# Patient Record
Sex: FEMALE | Race: WHITE
[De-identification: names, ages, dates, MRNs, and addresses within clinical notes are randomized per-mention and may not be internally consistent; named-entity substitution may affect disease eponyms.]

---

## 2019-09-23 ENCOUNTER — HOSPITAL ENCOUNTER (OUTPATIENT)
Dept: HOSPITAL 82 - ORM | Age: 74
Discharge: HOME | End: 2019-09-23
Attending: ORTHOPAEDIC SURGERY
Payer: MEDICARE

## 2019-09-23 VITALS — BODY MASS INDEX: 30.29 KG/M2 | HEIGHT: 67 IN | WEIGHT: 193 LBS

## 2019-09-23 VITALS — DIASTOLIC BLOOD PRESSURE: 55 MMHG | SYSTOLIC BLOOD PRESSURE: 104 MMHG

## 2019-09-23 DIAGNOSIS — G56.02: Primary | ICD-10-CM

## 2019-09-23 DIAGNOSIS — E11.9: ICD-10-CM

## 2019-09-23 DIAGNOSIS — Z87.891: ICD-10-CM

## 2019-09-23 PROCEDURE — 01N50ZZ RELEASE MEDIAN NERVE, OPEN APPROACH: ICD-10-PCS | Performed by: ORTHOPAEDIC SURGERY

## 2021-03-24 ENCOUNTER — OFFICE VISIT (OUTPATIENT)
Dept: PRIMARY CARE CLINIC | Age: 76
End: 2021-03-24
Payer: MEDICARE

## 2021-03-24 VITALS
HEIGHT: 67 IN | BODY MASS INDEX: 29.38 KG/M2 | DIASTOLIC BLOOD PRESSURE: 68 MMHG | SYSTOLIC BLOOD PRESSURE: 120 MMHG | TEMPERATURE: 96.9 F | WEIGHT: 187.2 LBS | HEART RATE: 58 BPM | OXYGEN SATURATION: 97 %

## 2021-03-24 DIAGNOSIS — E11.65 TYPE 2 DIABETES MELLITUS WITH HYPERGLYCEMIA, WITH LONG-TERM CURRENT USE OF INSULIN (HCC): ICD-10-CM

## 2021-03-24 DIAGNOSIS — K21.9 GASTROESOPHAGEAL REFLUX DISEASE WITHOUT ESOPHAGITIS: ICD-10-CM

## 2021-03-24 DIAGNOSIS — H61.23 BILATERAL HEARING LOSS DUE TO CERUMEN IMPACTION: ICD-10-CM

## 2021-03-24 DIAGNOSIS — Z79.4 TYPE 2 DIABETES MELLITUS WITH HYPERGLYCEMIA, WITH LONG-TERM CURRENT USE OF INSULIN (HCC): ICD-10-CM

## 2021-03-24 DIAGNOSIS — G47.33 OSA (OBSTRUCTIVE SLEEP APNEA): Primary | ICD-10-CM

## 2021-03-24 DIAGNOSIS — E08.40 DIABETES MELLITUS DUE TO UNDERLYING CONDITION WITH DIABETIC NEUROPATHY, WITH LONG-TERM CURRENT USE OF INSULIN (HCC): ICD-10-CM

## 2021-03-24 DIAGNOSIS — R53.83 OTHER FATIGUE: ICD-10-CM

## 2021-03-24 DIAGNOSIS — Z79.4 DIABETES MELLITUS DUE TO UNDERLYING CONDITION WITH DIABETIC NEUROPATHY, WITH LONG-TERM CURRENT USE OF INSULIN (HCC): ICD-10-CM

## 2021-03-24 DIAGNOSIS — E11.42 DIABETIC POLYNEUROPATHY ASSOCIATED WITH TYPE 2 DIABETES MELLITUS (HCC): ICD-10-CM

## 2021-03-24 DIAGNOSIS — R79.9 ABNORMAL FINDING OF BLOOD CHEMISTRY, UNSPECIFIED: ICD-10-CM

## 2021-03-24 LAB — HBA1C MFR BLD: 8.6 %

## 2021-03-24 PROCEDURE — 83036 HEMOGLOBIN GLYCOSYLATED A1C: CPT | Performed by: PHYSICIAN ASSISTANT

## 2021-03-24 PROCEDURE — 99204 OFFICE O/P NEW MOD 45 MIN: CPT | Performed by: PHYSICIAN ASSISTANT

## 2021-03-24 PROCEDURE — 69209 REMOVE IMPACTED EAR WAX UNI: CPT | Performed by: PHYSICIAN ASSISTANT

## 2021-03-24 RX ORDER — GEMFIBROZIL 600 MG/1
TABLET, FILM COATED ORAL
COMMUNITY
Start: 2021-02-24 | End: 2021-03-24 | Stop reason: SDUPTHER

## 2021-03-24 RX ORDER — GABAPENTIN 300 MG/1
300 CAPSULE ORAL 3 TIMES DAILY
Qty: 90 CAPSULE | Refills: 2 | Status: SHIPPED | OUTPATIENT
Start: 2021-03-24 | End: 2021-06-15 | Stop reason: SDUPTHER

## 2021-03-24 RX ORDER — PANTOPRAZOLE SODIUM 40 MG/1
TABLET, DELAYED RELEASE ORAL
COMMUNITY
Start: 2021-02-24 | End: 2021-03-24 | Stop reason: SDUPTHER

## 2021-03-24 RX ORDER — GLIPIZIDE 5 MG/1
5 TABLET ORAL
Qty: 60 TABLET | Refills: 2 | Status: SHIPPED | OUTPATIENT
Start: 2021-03-24 | End: 2021-06-14 | Stop reason: SDUPTHER

## 2021-03-24 RX ORDER — GEMFIBROZIL 600 MG/1
600 TABLET, FILM COATED ORAL 2 TIMES DAILY
Qty: 60 TABLET | Refills: 1 | Status: SHIPPED | OUTPATIENT
Start: 2021-03-24 | End: 2021-06-30 | Stop reason: SDUPTHER

## 2021-03-24 RX ORDER — PANTOPRAZOLE SODIUM 40 MG/1
40 TABLET, DELAYED RELEASE ORAL DAILY
Qty: 90 TABLET | Refills: 2 | Status: SHIPPED | OUTPATIENT
Start: 2021-03-24 | End: 2021-06-15 | Stop reason: SDUPTHER

## 2021-03-24 RX ORDER — GABAPENTIN 300 MG/1
CAPSULE ORAL
COMMUNITY
Start: 2021-01-23 | End: 2021-03-24 | Stop reason: SDUPTHER

## 2021-03-24 SDOH — ECONOMIC STABILITY: TRANSPORTATION INSECURITY
IN THE PAST 12 MONTHS, HAS THE LACK OF TRANSPORTATION KEPT YOU FROM MEDICAL APPOINTMENTS OR FROM GETTING MEDICATIONS?: NO

## 2021-03-24 SDOH — HEALTH STABILITY: MENTAL HEALTH: HOW OFTEN DO YOU HAVE A DRINK CONTAINING ALCOHOL?: NEVER

## 2021-03-24 SDOH — ECONOMIC STABILITY: INCOME INSECURITY: HOW HARD IS IT FOR YOU TO PAY FOR THE VERY BASICS LIKE FOOD, HOUSING, MEDICAL CARE, AND HEATING?: NOT HARD AT ALL

## 2021-03-24 ASSESSMENT — PATIENT HEALTH QUESTIONNAIRE - PHQ9
SUM OF ALL RESPONSES TO PHQ QUESTIONS 1-9: 0
SUM OF ALL RESPONSES TO PHQ9 QUESTIONS 1 & 2: 0
1. LITTLE INTEREST OR PLEASURE IN DOING THINGS: 0
SUM OF ALL RESPONSES TO PHQ QUESTIONS 1-9: 0

## 2021-03-24 ASSESSMENT — ENCOUNTER SYMPTOMS
PHOTOPHOBIA: 0
CONSTIPATION: 0
EYE DISCHARGE: 0
SHORTNESS OF BREATH: 1
DIARRHEA: 1
RHINORRHEA: 0
ABDOMINAL DISTENTION: 0
SINUS PRESSURE: 1
ABDOMINAL PAIN: 0
COUGH: 1
CHEST TIGHTNESS: 0
SORE THROAT: 0
VOMITING: 0

## 2021-03-24 NOTE — PATIENT INSTRUCTIONS
Patient Education        High Cholesterol: Care Instructions  Your Care Instructions     Cholesterol is a type of fat in your blood. It is needed for many body functions, such as making new cells. Cholesterol is made by your body. It also comes from food you eat. High cholesterol means that you have too much of the fat in your blood. This raises your risk of a heart attack and stroke. LDL and HDL are part of your total cholesterol. LDL is the \"bad\" cholesterol. High LDL can raise your risk for heart disease, heart attack, and stroke. HDL is the \"good\" cholesterol. It helps clear bad cholesterol from the body. High HDL is linked with a lower risk of heart disease, heart attack, and stroke. Your cholesterol levels help your doctor find out your risk for having a heart attack or stroke. You and your doctor can talk about whether you need to lower your risk and what treatment is best for you. A heart-healthy lifestyle along with medicines can help lower your cholesterol and your risk. The way you choose to lower your risk will depend on how high your risk is for heart attack and stroke. It will also depend on how you feel about taking medicines. Follow-up care is a key part of your treatment and safety. Be sure to make and go to all appointments, and call your doctor if you are having problems. It's also a good idea to know your test results and keep a list of the medicines you take. How can you care for yourself at home? · Eat a variety of foods every day. Good choices include fruits, vegetables, whole grains (like oatmeal), dried beans and peas, nuts and seeds, soy products (like tofu), and fat-free or low-fat dairy products. · Replace butter, margarine, and hydrogenated or partially hydrogenated oils with olive and canola oils. (Canola oil margarine without trans fat is fine.)  · Replace red meat with fish, poultry, and soy protein (like tofu).   · Limit processed and packaged foods like chips, crackers, and cookies. · Bake, broil, or steam foods. Don't gamble them. · Be physically active. Get at least 30 minutes of exercise on most days of the week. Walking is a good choice. You also may want to do other activities, such as running, swimming, cycling, or playing tennis or team sports. · Stay at a healthy weight or lose weight by making the changes in eating and physical activity listed above. Losing just a small amount of weight, even 5 to 10 pounds, can reduce your risk for having a heart attack or stroke. · Do not smoke. When should you call for help? Watch closely for changes in your health, and be sure to contact your doctor if:    · You need help making lifestyle changes.     · You have questions about your medicine. Where can you learn more? Go to https://chpepiceweb.[employer]. org and sign in to your [employer] account. Enter E899 in the [facility] box to learn more about \"High Cholesterol: Care Instructions. \"              Patient Education        Follow-up care is a key part of your treatment and safety. Be sure to make and go to all appointments, and call your doctor if you are having problems. It's also a good idea to know your test results and keep a list of the medicines you take. How can you care for yourself at home? Following the DASH diet  · Eat 4 to 5 servings of fruit each day. A serving is 1 medium-sized piece of fruit, ½ cup chopped or canned fruit, 1/4 cup dried fruit, or 4 ounces (½ cup) of fruit juice. Choose fruit more often than fruit juice. · Eat 4 to 5 servings of vegetables each day. A serving is 1 cup of lettuce or raw leafy vegetables, ½ cup of chopped or cooked vegetables, or 4 ounces (½ cup) of vegetable juice. Choose vegetables more often than vegetable juice. · Get 2 to 3 servings of low-fat and fat-free dairy each day. A serving is 8 ounces of milk, 1 cup of yogurt, or 1 ½ ounces of cheese. · Eat 6 to 8 servings of grains each day.  A serving is 1 slice of bread, 1 ounce of dry cereal, or ½ cup of cooked rice, pasta, or cooked cereal. Try to choose whole-grain products as much as possible. · Limit lean meat, poultry, and fish to 2 servings each day. A serving is 3 ounces, about the size of a deck of cards. · Eat 4 to 5 servings of nuts, seeds, and legumes (cooked dried beans, lentils, and split peas) each week. A serving is 1/3 cup of nuts, 2 tablespoons of seeds, or ½ cup of cooked beans or peas. · Limit fats and oils to 2 to 3 servings each day. A serving is 1 teaspoon of vegetable oil or 2 tablespoons of salad dressing. · Limit sweets and added sugars to 5 servings or less a week. A serving is 1 tablespoon jelly or jam, ½ cup sorbet, or 1 cup of lemonade. · Eat less than 2,300 milligrams (mg) of sodium a day. If you limit your sodium to 1,500 mg a day, you can lower your blood pressure even more. · Be aware that all of these are the suggested number of servings for people who eat 1,800 to 2,000 calories a day. Your recommended number of servings may be different if you need more or fewer calories. Tips for success  · Start small. Do not try to make dramatic changes to your diet all at once. You might feel that you are missing out on your favorite foods and then be more likely to not follow the plan. Make small changes, and stick with them. Once those changes become habit, add a few more changes. · Try some of the following:  ? Make it a goal to eat a fruit or vegetable at every meal and at snacks. This will make it easy to get the recommended amount of fruits and vegetables each day. ? Try yogurt topped with fruit and nuts for a snack or healthy dessert. ? Add lettuce, tomato, cucumber, and onion to sandwiches. ? Combine a ready-made pizza crust with low-fat mozzarella cheese and lots of vegetable toppings. Try using tomatoes, squash, spinach, broccoli, carrots, cauliflower, and onions. ?  Have a variety of cut-up vegetables with a low-fat dip as an Carbohydrate is found in many foods. · Learn which foods have carbs. And learn the amounts of carbs in different foods. ? Bread, cereal, pasta, and rice have about 15 grams of carbs in a serving. A serving is 1 slice of bread (1 ounce), ½ cup of cooked cereal, or 1/3 cup of cooked pasta or rice. ? Fruits have 15 grams of carbs in a serving. A serving is 1 small fresh fruit, such as an apple or orange; ½ of a banana; ½ cup of cooked or canned fruit; ½ cup of fruit juice; 1 cup of melon or raspberries; or 2 tablespoons of dried fruit. ? Milk and no-sugar-added yogurt have 15 grams of carbs in a serving. A serving is 1 cup of milk or 2/3 cup of no-sugar-added yogurt. ? Starchy vegetables have 15 grams of carbs in a serving. A serving is ½ cup of mashed potatoes or sweet potato; 1 cup winter squash; ½ of a small baked potato; ½ cup of cooked beans; or ½ cup cooked corn or green peas. · Learn how much carbs to eat each day and at each meal. A dietitian or CDE can teach you how to keep track of the amount of carbs you eat. This is called carbohydrate counting. · If you are not sure how to count carbohydrate grams, use the Plate Method to plan meals. It is a good, quick way to make sure that you have a balanced meal. It also helps you spread carbs throughout the day. ? Divide your plate by types of foods. Put non-starchy vegetables on half the plate, meat or other protein food on one-quarter of the plate, and a grain or starchy vegetable in the final quarter of the plate. To this you can add a small piece of fruit and 1 cup of milk or yogurt, depending on how many carbs you are supposed to eat at a meal.  · Try to eat about the same amount of carbs at each meal. Do not \"save up\" your daily allowance of carbs to eat at one meal.  · Proteins have very little or no carbs per serving. Examples of proteins are beef, chicken, turkey, fish, eggs, tofu, cheese, cottage cheese, and peanut butter.  A serving size of meat is 3 ounces, which is about the size of a deck of cards. Examples of meat substitute serving sizes (equal to 1 ounce of meat) are 1/4 cup of cottage cheese, 1 egg, 1 tablespoon of peanut butter, and ½ cup of tofu. How can you eat out and still eat healthy? · Learn to estimate the serving sizes of foods that have carbohydrate. If you measure food at home, it will be easier to estimate the amount in a serving of restaurant food. · If the meal you order has too much carbohydrate (such as potatoes, corn, or baked beans), ask to have a low-carbohydrate food instead. Ask for a salad or green vegetables. · If you use insulin, check your blood sugar before and after eating out to help you plan how much to eat in the future. · If you eat more carbohydrate at a meal than you had planned, take a walk or do other exercise. This will help lower your blood sugar. What are some tips for eating healthy? · Limit saturated fat, such as the fat from meat and dairy products. This is a healthy choice because people who have diabetes are at higher risk of heart disease. So choose lean cuts of meat and nonfat or low-fat dairy products. Use olive or canola oil instead of butter or shortening when cooking. · Don't skip meals. Your blood sugar may drop too low if you skip meals and take insulin or certain medicines for diabetes. · Check with your doctor before you drink alcohol. Alcohol can cause your blood sugar to drop too low. Alcohol can also cause a bad reaction if you take certain diabetes medicines. Follow-up care is a key part of your treatment and safety. Be sure to make and go to all appointments, and call your doctor if you are having problems. It's also a good idea to know your test results and keep a list of the medicines you take. Where can you learn more? Go to https://chyoan.healthAviantLogic. org and sign in to your [employer] account.  Enter Q609 in the ChoiceMap box to learn more about \"Learning About Carbohydrate (Carb) Counting and Eating Out When You Have Diabetes. \"     If you do not have an account, please click on the \"Sign Up Now\" link. Current as of: August 31, 2020               Content Version: 12.8  © 2006-2021 Healthwise, Incorporated. Care instructions adapted under license by [facility] ([facility]). If you have questions about a medical condition or this instruction, always ask your healthcare professional. [address] any warranty or liability for your use of this information.

## 2021-03-24 NOTE — PROGRESS NOTES
[facility] PRIMARY CARE  [address]  [address] [address]  Dept: [address] [place] [address] Erin Jacobs is a 77 y.o. female New patient, who presents today for her medical conditions/complaints as noted below. Chief Complaint   Patient presents with    New Patient       HPI:     HPI: Patient has cpap needs titrated. She is waking up short of breath ad thinks the machine needs adjusted. Has had it adjusted once in the last 10 years. She is unsure where this was done last. She feels like the CPAP is not working. Still having fatigue through the day. Pt has diabetes needs blood work last a1c was around 8.3. Her sugars are running around 145-150. If she goes under 120 then she will be shaky. She is taking 70:30 insulin 45 units in the morings. She is checking once daily. She has neuropathy taking 1 in the am and two at night. The patient needs laser treatment since she is not seeing very clear due to a film on her eyes. Wonders about covid vaccine. She had a reaction to the flu vaccine. She would like her ears checked. She is not hearing as well as she should. Has had wax in the past.     Patient was on lipitor and she started to have liver problems. Has doctor in [address] [place] [address] and [facility]. The patient has had a colonoscopy in last 5 years. She has had polyps which were clear. Not due at this time.      Reviewed prior notes None  Reviewed previous Labs    No results found for: LDLCHOLESTEROL, LDLCALC    (goal LDL is <100)   Hemoglobin A1C (%)   Date Value   03/24/2021 8.6     BP Readings from Last 3 Encounters:   03/24/21 120/68          (goal 120/80)    Past Medical History:   Diagnosis Date    Diabetes mellitus ([address]     Seasonal allergies       Past Surgical History:   Procedure Laterality Date    APPENDECTOMY      GALLBLADDER SURGERY      HAND SURGERY N/A     Both hands and right twice     KNEE ARTHROPLASTY      PARTIAL HYSTERECTOMY Right     Age 33. 1 ovary and F. tube    SINUS SURGERY N/A     Two       Family History   Problem Relation Age of Onset    Kidney Disease Mother     Other Mother     Cancer Mother     Heart Disease Father     Stroke Father     Cancer Sister     Cancer Brother        Social History     Tobacco Use    Smoking status: Never Smoker    Smokeless tobacco: Never Used   Substance Use Topics    Alcohol use: Never     Frequency: Never      Current Outpatient Medications   Medication Sig Dispense Refill    pantoprazole (PROTONIX) 40 MG tablet Take 1 tablet by mouth daily 90 tablet 2    metFORMIN (GLUCOPHAGE) 500 MG tablet Take 1 tablet by mouth three times a week 60 tablet 2    gemfibrozil (LOPID) 600 MG tablet Take 1 tablet by mouth 2 times daily 60 tablet 1    gabapentin (NEURONTIN) 300 MG capsule Take 1 capsule by mouth 3 times daily for 90 days. 90 capsule 2    insulin 70-30 (NOVOLIN 70/30) (70-30) 100 UNIT per ML injection vial Inject 45 Units into the skin 2 times daily 2 vial 3    glipiZIDE (GLUCOTROL) 5 MG tablet Take 1 tablet by mouth 2 times daily (before meals) 60 tablet 2     No current facility-administered medications for this visit. Allergies   Allergen Reactions    Influenza Vaccines Other (See Comments)    Penicillins     Pneumococcal Vaccine     Seasonal        Health Maintenance   Topic Date Due    Hepatitis C screen  Never done    COVID-19 Vaccine (1) Never done    DTaP/Tdap/Td vaccine (1 - Tdap) Never done    Lipid screen  Never done    Shingles Vaccine (1 of 2) Never done    Colon cancer screen colonoscopy  Never done    DEXA (modify frequency per FRAX score)  Never done    Hepatitis A vaccine  Aged Out    Hepatitis B vaccine  Aged Out    Hib vaccine  Aged Out    Meningococcal (ACWY) vaccine  Aged Out       Subjective:      Review of Systems   Constitutional: Negative for chills, fever and unexpected weight change.    HENT: Positive for hearing loss and sinus pressure (common for her). Negative for congestion, rhinorrhea and sore throat. Eyes: Positive for visual disturbance. Negative for photophobia and discharge. Respiratory: Positive for cough and shortness of breath (due to CPAP in the mornings ). Negative for chest tightness. Cardiovascular: Positive for leg swelling (ankles at time). Negative for chest pain and palpitations. Gastrointestinal: Positive for diarrhea (with metformin). Negative for abdominal distention, abdominal pain, constipation and vomiting. Endocrine: Negative for polydipsia and polyuria. Genitourinary: Negative for decreased urine volume, difficulty urinating, frequency and urgency. Musculoskeletal: Negative for arthralgias, gait problem and myalgias. Skin: Negative for rash. Allergic/Immunologic: Negative for food allergies. Neurological: Positive for dizziness (Vertigo). Negative for weakness, numbness and headaches. Hematological: Negative for adenopathy. Psychiatric/Behavioral: Negative for dysphoric mood and sleep disturbance. The patient is not nervous/anxious. Objective:     /68   Pulse 58   Temp 96.9 °F (36.1 °C)   Ht 5' 6.54\" (1.69 m)   Wt 187 lb 3.2 oz (84.9 kg)   SpO2 97%   BMI 29.73 kg/m²   Physical Exam  Constitutional:       General: She is not in acute distress. Appearance: Normal appearance. She is not ill-appearing. HENT:      Head: Normocephalic and atraumatic. Right Ear: External ear normal. There is impacted cerumen. Left Ear: External ear normal. There is impacted cerumen. Nose: Nose normal.      Mouth/Throat:      Mouth: Mucous membranes are moist.   Eyes:      Extraocular Movements: Extraocular movements intact. Conjunctiva/sclera: Conjunctivae normal.      Pupils: Pupils are equal, round, and reactive to light. Neck:      Musculoskeletal: Normal range of motion and neck supple. Vascular: No carotid bruit.    Cardiovascular:      Rate and

## 2021-03-26 ENCOUNTER — HOSPITAL ENCOUNTER (OUTPATIENT)
Age: 76
Setting detail: SPECIMEN
Discharge: HOME | End: 2021-03-26
Payer: MEDICARE

## 2021-03-26 DIAGNOSIS — R53.83 OTHER FATIGUE: ICD-10-CM

## 2021-03-26 DIAGNOSIS — R79.9 ABNORMAL FINDING OF BLOOD CHEMISTRY, UNSPECIFIED: ICD-10-CM

## 2021-03-26 LAB
ABSOLUTE EOS #: 0.26 K/UL (ref 0–0.44)
ABSOLUTE IMMATURE GRANULOCYTE: <0.03 K/UL (ref 0–0.3)
ABSOLUTE LYMPH #: 2.63 K/UL (ref 1.1–3.7)
ABSOLUTE MONO #: 0.48 K/UL (ref 0.1–1.2)
BASOPHILS # BLD: 1 % (ref 0–2)
BASOPHILS ABSOLUTE: 0.05 K/UL (ref 0–0.2)
DIFFERENTIAL TYPE: ABNORMAL
EOSINOPHILS RELATIVE PERCENT: 5 % (ref 1–4)
HCT VFR BLD CALC: 43.2 % (ref 36.3–47.1)
HEMOGLOBIN: 13.6 G/DL (ref 11.9–15.1)
IMMATURE GRANULOCYTES: 0 %
LYMPHOCYTES # BLD: 50 % (ref 24–43)
MCH RBC QN AUTO: 31 PG (ref 25.2–33.5)
MCHC RBC AUTO-ENTMCNC: 31.5 G/DL (ref 28.4–34.8)
MCV RBC AUTO: 98.4 FL (ref 82.6–102.9)
MONOCYTES # BLD: 9 % (ref 3–12)
NRBC AUTOMATED: 0 PER 100 WBC
PDW BLD-RTO: 13.1 % (ref 11.8–14.4)
PLATELET # BLD: 263 K/UL (ref 138–453)
PLATELET ESTIMATE: ABNORMAL
PMV BLD AUTO: 10.5 FL (ref 8.1–13.5)
RBC # BLD: 4.39 M/UL (ref 3.95–5.11)
RBC # BLD: ABNORMAL 10*6/UL
SEG NEUTROPHILS: 35 % (ref 36–65)
SEGMENTED NEUTROPHILS ABSOLUTE COUNT: 1.86 K/UL (ref 1.5–8.1)
VITAMIN B-12: 859 PG/ML (ref 232–1245)
WBC # BLD: 5.3 K/UL (ref 3.5–11.3)
WBC # BLD: ABNORMAL 10*3/UL

## 2021-03-27 LAB
ALBUMIN SERPL-MCNC: 4.3 G/DL (ref 3.5–5.2)
ALBUMIN/GLOBULIN RATIO: 1.3 (ref 1–2.5)
ALP BLD-CCNC: 75 U/L (ref 35–104)
ALT SERPL-CCNC: 17 U/L (ref 5–33)
ANION GAP SERPL CALCULATED.3IONS-SCNC: 8 MMOL/L (ref 9–17)
AST SERPL-CCNC: 19 U/L
BILIRUB SERPL-MCNC: 0.4 MG/DL (ref 0.3–1.2)
BUN BLDV-MCNC: 15 MG/DL (ref 8–23)
BUN/CREAT BLD: ABNORMAL (ref 9–20)
CALCIUM SERPL-MCNC: 9.8 MG/DL (ref 8.6–10.4)
CHLORIDE BLD-SCNC: 104 MMOL/L (ref 98–107)
CHOLESTEROL, FASTING: 233 MG/DL
CHOLESTEROL/HDL RATIO: 6.5
CO2: 28 MMOL/L (ref 20–31)
CREAT SERPL-MCNC: 0.75 MG/DL (ref 0.5–0.9)
GFR AFRICAN AMERICAN: >60 ML/MIN
GFR NON-AFRICAN AMERICAN: >60 ML/MIN
GFR SERPL CREATININE-BSD FRML MDRD: ABNORMAL ML/MIN/{1.73_M2}
GFR SERPL CREATININE-BSD FRML MDRD: ABNORMAL ML/MIN/{1.73_M2}
GLUCOSE BLD-MCNC: 148 MG/DL (ref 70–99)
HDLC SERPL-MCNC: 36 MG/DL
LDL CHOLESTEROL: 173 MG/DL (ref 0–130)
POTASSIUM SERPL-SCNC: 4.4 MMOL/L (ref 3.7–5.3)
SODIUM BLD-SCNC: 140 MMOL/L (ref 135–144)
TOTAL PROTEIN: 7.5 G/DL (ref 6.4–8.3)
TRIGLYCERIDE, FASTING: 122 MG/DL
TSH SERPL DL<=0.05 MIU/L-ACNC: 2.76 MIU/L (ref 0.3–5)
VLDLC SERPL CALC-MCNC: ABNORMAL MG/DL (ref 1–30)

## 2021-03-30 DIAGNOSIS — G47.33 OSA (OBSTRUCTIVE SLEEP APNEA): Primary | ICD-10-CM

## 2021-04-08 ENCOUNTER — NURSE TRIAGE (OUTPATIENT)
Dept: OTHER | Facility: CLINIC | Age: 76
End: 2021-04-08

## 2021-04-08 ENCOUNTER — HOSPITAL ENCOUNTER (OUTPATIENT)
Age: 76
Setting detail: SPECIMEN
Discharge: HOME | End: 2021-04-08
Payer: MEDICARE

## 2021-04-08 ENCOUNTER — OFFICE VISIT (OUTPATIENT)
Dept: PRIMARY CARE CLINIC | Age: 76
End: 2021-04-08
Payer: MEDICARE

## 2021-04-08 VITALS
SYSTOLIC BLOOD PRESSURE: 126 MMHG | OXYGEN SATURATION: 96 % | HEART RATE: 73 BPM | DIASTOLIC BLOOD PRESSURE: 60 MMHG | TEMPERATURE: 98.2 F

## 2021-04-08 DIAGNOSIS — J40 BRONCHITIS: ICD-10-CM

## 2021-04-08 DIAGNOSIS — R05.9 COUGH: Primary | ICD-10-CM

## 2021-04-08 DIAGNOSIS — R05.9 COUGH: ICD-10-CM

## 2021-04-08 PROCEDURE — 99213 OFFICE O/P EST LOW 20 MIN: CPT | Performed by: FAMILY MEDICINE

## 2021-04-08 RX ORDER — AZITHROMYCIN 250 MG/1
250 TABLET, FILM COATED ORAL SEE ADMIN INSTRUCTIONS
Qty: 6 TABLET | Refills: 0 | Status: SHIPPED | OUTPATIENT
Start: 2021-04-08 | End: 2021-04-13

## 2021-04-08 ASSESSMENT — PATIENT HEALTH QUESTIONNAIRE - PHQ9
2. FEELING DOWN, DEPRESSED OR HOPELESS: 0
SUM OF ALL RESPONSES TO PHQ9 QUESTIONS 1 & 2: 0
SUM OF ALL RESPONSES TO PHQ QUESTIONS 1-9: 0

## 2021-04-08 ASSESSMENT — ENCOUNTER SYMPTOMS: COUGH: 1

## 2021-04-08 NOTE — TELEPHONE ENCOUNTER
Received call from Chantel at pre-service center [facility] with [employer] Complaint. Brief description of triage: see below     Triage indicates for patient to discuss with PCP within 24 hours. Pt agreeable to POC. Care advice provided, patient verbalizes understanding; denies any other questions or concerns; instructed to call back for any new or worsening symptoms. Writer provided warm transfer to [facility] for appointment scheduling. Attention Provider: Thank you for allowing me to participate in the care of your patient. The patient was connected to triage in response to information provided to the [facility]. Please do not respond through this encounter as the response is not directed to a shared pool. Reason for Disposition   [1] Continuous (nonstop) coughing interferes with work or school AND [2] no improvement using cough treatment per protocol    Answer Assessment - Initial Assessment Questions  1. COVID-19 DIAGNOSIS: \"Who made your Coronavirus (COVID-19) diagnosis? \" \"Was it confirmed by a positive lab test?\" If not diagnosed by a HCP, ask \"Are there lots of cases (community spread) where you live? \" (See public health department website, if unsure)      Pt denies    2. COVID-19 EXPOSURE: \"Was there any known exposure to COVID before the symptoms began? \" CDC Definition of close contact: within 6 feet (2 meters) for a total of 15 minutes or more over a 24-hour period. Pt denies    3. ONSET: \"When did the COVID-19 symptoms start?\"       1 week ago    4. WORST SYMPTOM: \"What is your worst symptom? \" (e.g., cough, fever, shortness of breath, muscle aches)      Pt states fatigue    5. COUGH: \"Do you have a cough? \" If so, ask: \"How bad is the cough? \"        Pt with productive cough that produces a yellowish to clear mucus    6. FEVER: \"Do you have a fever? \" If so, ask: \"What is your temperature, how was it measured, and when did it start? \"      Pt denies fever    7.  RESPIRATORY STATUS: \"Describe your breathing? \" (e.g., shortness of breath, wheezing, unable to speak)       Pt states shallow breathing to prevent coughing    8. BETTER-SAME-WORSE: Kendall Gisele you getting better, staying the same or getting worse compared to yesterday? \"  If getting worse, ask, \"In what way? \"      Pt states same as yesterday    9. HIGH RISK DISEASE: \"Do you have any chronic medical problems? \" (e.g., asthma, heart or lung disease, weak immune system, obesity, etc.)      Diabetes, CPAP    10. PREGNANCY: \"Is there any chance you are pregnant? \" \"When was your last menstrual period? \"        N/A    11. OTHER SYMPTOMS: \"Do you have any other symptoms? \"  (e.g., chills, fatigue, headache, loss of smell or taste, muscle pain, sore throat; new loss of smell or taste especially support the diagnosis of COVID-19)        Fatigue (sleeping most of the day), RASHIDA, night sweats, cough    Protocols used: CORONAVIRUS (COVID-19) [facility]NOSED [facility]USPECT[facility]

## 2021-04-08 NOTE — PATIENT INSTRUCTIONS
Patient Education        Bronchitis: Care Instructions  Your Care Instructions     Bronchitis is inflammation of the bronchial tubes, which carry air to the lungs. The tubes swell and produce mucus, or phlegm. The mucus and inflamed bronchial tubes make you cough. You may have trouble breathing. Most cases of bronchitis are caused by viruses like those that cause colds. Antibiotics usually do not help and they may be harmful. Bronchitis usually develops rapidly and lasts about 2 to 3 weeks in otherwise healthy people. Follow-up care is a key part of your treatment and safety. Be sure to make and go to all appointments, and call your doctor if you are having problems. It's also a good idea to know your test results and keep a list of the medicines you take. How can you care for yourself at home? · Take all medicines exactly as prescribed. Call your doctor if you think you are having a problem with your medicine. · Get some extra rest.  · Take an over-the-counter pain medicine, such as acetaminophen (Tylenol), ibuprofen (Advil, Motrin), or naproxen (Aleve) to reduce fever and relieve body aches. Read and follow all instructions on the label. · Do not take two or more pain medicines at the same time unless the doctor told you to. Many pain medicines have acetaminophen, which is Tylenol. Too much acetaminophen (Tylenol) can be harmful. · Take an over-the-counter cough medicine that contains dextromethorphan to help quiet a dry, hacking cough so that you can sleep. Avoid cough medicines that have more than one active ingredient. Read and follow all instructions on the label. · Breathe moist air from a humidifier, hot shower, or sink filled with hot water. The heat and moisture will thin mucus so you can cough it out. · Do not smoke. Smoking can make bronchitis worse. If you need help quitting, talk to your doctor about stop-smoking programs and medicines.  These can increase your chances of quitting for good.  When should you call for help? Call 911 anytime you think you may need emergency care. For example, call if:    · You have severe trouble breathing. Call your doctor now or seek immediate medical care if:    · You have new or worse trouble breathing.     · You cough up dark brown or bloody mucus (sputum).     · You have a new or higher fever.     · You have a new rash. Watch closely for changes in your health, and be sure to contact your doctor if:    · You cough more deeply or more often, especially if you notice more mucus or a change in the color of your mucus.     · You are not getting better as expected. Where can you learn more? Go to https://TherMark.Net Transmit & Receive. org and sign in to your Son[employer] account. Enter H333 in the [employer] box to learn more about \"Bronchitis: Care Instructions. \"     If you do not have an account, please click on the \"Sign Up Now\" link. Current as of: October 26, 2020               Content Version: 12.8  © 2006-2021 [employer]. Care instructions adapted under license by [facility] ([facility]). If you have questions about a medical condition or this instruction, always ask your healthcare professional. [place] [address] any warranty or liability for your use of this information. Patient Education        Learning About COVID-19 and Flu Symptoms  How can you tell COVID-19 from the flu? COVID-19 and the flu have similar symptoms. The two can be hard to tell apart. The only way to know for sure which illness you have is to be tested. Since the symptoms are so alike, it makes sense to act as if you have COVID-19 until your test results come back. This means staying home and limiting contact with people in your home. You'll need to wash your hands often and disinfect surfaces that you touch. And be sure to wear a mask or face covering when you're around other people.  This is also good advice if you think you have the flu.  COVID-19 and the flu have these symptoms in common:  · Fever or chills  · Cough  · Shortness of breath  · Fatigue (tiredness)  · Sore throat  · Runny or stuffy nose  · Muscle pain or body aches  · Headache  · Vomiting and diarrhea (more common in children than adults)  COVID-19 has another symptom that also may occur:  · New loss of taste or smell  COVID-19 symptoms may appear from 2 to 14 days after infection. Flu symptoms usually appear 1 to 4 days after infection. Why should you get a flu shot during the COVID-19 pandemic? It's important to get your yearly flu vaccine. Both the flu and COVID-19 are expected to be active during flu season. You can get sick with both infections at once. And having both may make you more sick than getting just one. The flu vaccine won't protect you from COVID-19. But it can help prevent the flu or reduce its symptoms. If fewer people get very ill with the flu, this will help free up medical resources that are needed for COVID-19 patients. Where can you learn more? Go to https://[email or web address].[email or web address] org and sign in to your [employer] account. Enter C123 in the [facility] box to learn more about \"Learning About COVID-19 and Flu Symptoms. \"     If you do not have an account, please click on the \"Sign Up Now\" link. Current as of: December 18, 2020               Content Version: 12.8  © 4355-5482 Healthwise, [facility]. Care instructions adapted under license by [facility] ([facility]). If you have questions about a medical condition or this instruction, always ask your healthcare professional. [place] [address] any warranty or liability for your use of this information.

## 2021-04-08 NOTE — PROGRESS NOTES
David Lepe is a 77 y.o. femalewho presents today for her medical conditions/complaints as noted below. Chief Complaint   Patient presents with    Shortness of Breath     x 1wk    Fatigue     x1wk    Anorexia     x 1wk    Cough     x 1wk         HPI:     HPI  Coughing up yellow phlegm for 1 week  + PND  Feels weak. Decreased apettite, vomited twice  Sipping on broth, water keeping that down. Sleeping more  No fever but + chills    Current Outpatient Medications   Medication Sig Dispense Refill    azithromycin (ZITHROMAX) 250 MG tablet Take 1 tablet by mouth See Admin Instructions for 5 days 500mg on day 1 followed by 250mg on days 2 - 5 6 tablet 0    pantoprazole (PROTONIX) 40 MG tablet Take 1 tablet by mouth daily 90 tablet 2    metFORMIN (GLUCOPHAGE) 500 MG tablet Take 1 tablet by mouth three times a week 60 tablet 2    gemfibrozil (LOPID) 600 MG tablet Take 1 tablet by mouth 2 times daily 60 tablet 1    gabapentin (NEURONTIN) 300 MG capsule Take 1 capsule by mouth 3 times daily for 90 days. 90 capsule 2    insulin 70-30 (NOVOLIN 70/30) (70-30) 100 UNIT per ML injection vial Inject 45 Units into the skin 2 times daily 2 vial 3    glipiZIDE (GLUCOTROL) 5 MG tablet Take 1 tablet by mouth 2 times daily (before meals) 60 tablet 2     No current facility-administered medications for this visit. Allergies   Allergen Reactions    Aspirin Other (See Comments)    Influenza Vaccines Other (See Comments)    Penicillins     Pneumococcal Vaccine     Seasonal        Subjective:     Review of Systems   Constitutional: Negative for fever. Respiratory: Positive for cough. son and daughter in law live with her. Objective:     /60   Pulse 73   Temp 98.2 °F (36.8 °C)   SpO2 96%   Breastfeeding No   Physical Exam  Vitals signs and nursing note reviewed. Constitutional:       General: She is not in acute distress. Appearance: She is well-developed. She is not ill-appearing. HENT:      Head: Normocephalic and atraumatic. Right Ear: Tympanic membrane, ear canal and external ear normal.      Left Ear: Tympanic membrane, ear canal and external ear normal.   Eyes:      General: No scleral icterus. Right eye: No discharge. Left eye: No discharge. Conjunctiva/sclera: Conjunctivae normal.   Neck:      Thyroid: No thyromegaly. Trachea: No tracheal deviation. Cardiovascular:      Rate and Rhythm: Normal rate and regular rhythm. Heart sounds: Normal heart sounds. Pulmonary:      Effort: Pulmonary effort is normal. No respiratory distress. Breath sounds: Normal breath sounds. No wheezing. Lymphadenopathy:      Cervical: No cervical adenopathy. Skin:     General: Skin is warm. Findings: No rash. Neurological:      Mental Status: She is alert and oriented to person, place, and time. Psychiatric:         Mood and Affect: Mood normal.         Behavior: Behavior normal.         Thought Content: Thought content normal.         Assessment:       Diagnosis Orders   1. Cough  COVID-19    azithromycin (ZITHROMAX) 250 MG tablet   2. Bronchitis  azithromycin (ZITHROMAX) 250 MG tablet        Plan:      Return if symptoms worsen or fail to improve. She and her household need to quarantine. Call prn  Call if worse or go to ER  Start zpack in case it's bacterial.     Orders Placed This Encounter   Procedures    COVID-19     Standing Status:   Future     Standing Expiration Date:   4/8/2022     Scheduling Instructions:      1) Due to current limited availability of the COVID-19 test, tests will be prioritized based on responses to questions above. Testing may be delayed due to volume. 2) Print and instruct patient to adhere to [facility] home isolation program. (Link Above)              3) Set up or refer patient for a monitoring program.              4) Have patient sign up for and leverage MyChart (if not previously done).      Order Specific Question:   Is this test for diagnosis or screening? Answer:   Diagnosis of ill patient     Order Specific Question:   Symptomatic for COVID-19 as defined by CDC? Answer:   Yes     Order Specific Question:   Date of Symptom Onset     Answer:   3/31/2021     Order Specific Question:   Hospitalized for COVID-19? Answer:   No     Order Specific Question:   Admitted to ICU for COVID-19? Answer:   No     Order Specific Question:   Employed in healthcare setting? Answer:   No     Order Specific Question:   Resident in a congregate (group) care setting? Answer:   No     Order Specific Question:   Pregnant? Answer:   No     Order Specific Question:   Previously tested for COVID-19? Answer:   No     Orders Placed This Encounter   Medications    azithromycin (ZITHROMAX) 250 MG tablet     Sig: Take 1 tablet by mouth See Admin Instructions for 5 days 500mg on day 1 followed by 250mg on days 2 - 5     Dispense:  6 tablet     Refill:  0      Reviewed medications and possibleside effects.        Electronically signed by Mushtaq Cee MD on 4/8/2021 at 5:59 PM

## 2021-04-09 LAB
SARS-COV-2: ABNORMAL
SARS-COV-2: DETECTED
SOURCE: ABNORMAL

## 2021-04-28 ENCOUNTER — TELEPHONE (OUTPATIENT)
Dept: PRIMARY CARE CLINIC | Age: 76
End: 2021-04-28

## 2021-05-08 ENCOUNTER — HOSPITAL ENCOUNTER (OUTPATIENT)
Dept: LAB | Age: 76
Setting detail: SPECIMEN
Discharge: HOME | End: 2021-05-08
Payer: MEDICARE

## 2021-05-08 DIAGNOSIS — Z01.818 PREOP TESTING: Primary | ICD-10-CM

## 2021-05-14 ENCOUNTER — TELEPHONE (OUTPATIENT)
Dept: PRIMARY CARE CLINIC | Age: 76
End: 2021-05-14

## 2021-05-14 NOTE — TELEPHONE ENCOUNTER
Tried to call and reschedule appointment for 6/25/2021 due to provider being out that day .  No answer and Voicemail is full

## 2021-05-26 ENCOUNTER — HOSPITAL ENCOUNTER (OUTPATIENT)
Dept: SLEEP CENTER | Age: 76
Discharge: HOME | End: 2021-05-28
Payer: MEDICARE

## 2021-05-26 DIAGNOSIS — G47.33 OSA (OBSTRUCTIVE SLEEP APNEA): ICD-10-CM

## 2021-05-26 PROCEDURE — 95811 POLYSOM 6/>YRS CPAP 4/> PARM: CPT

## 2021-05-26 ASSESSMENT — SLEEP AND FATIGUE QUESTIONNAIRES
HOW LIKELY ARE YOU TO NOD OFF OR FALL ASLEEP WHILE LYING DOWN TO REST IN THE AFTERNOON WHEN CIRCUMSTANCES PERMIT: 3
HOW LIKELY ARE YOU TO NOD OFF OR FALL ASLEEP WHEN YOU ARE A PASSENGER IN A CAR FOR AN HOUR WITHOUT A BREAK: 0
HOW LIKELY ARE YOU TO NOD OFF OR FALL ASLEEP WHILE WATCHING TV: 2

## 2021-05-26 NOTE — PAYOR INFORMATION
Verified Demographics with Patient? No   If no why? Already verified  [facility] Completed? No    If not why? Already completed  Verified Insurance through: Already verified  COB Completed? Not required  Auth Verification #:[id number]  Liability Due: $250.00  Discount Offered: na%       Previous Balance: $ 0   Discount Offered: na%  Site Collect Status: Pt refused- Prefers to be billed  Patient Response: pt refused- was not aware she owed and was not prepared to make payment- prefers to be billed  Financial Aid Offered?  Yes Pt declined  Cards Scanned: yes

## 2021-05-27 VITALS
HEIGHT: 66 IN | RESPIRATION RATE: 14 BRPM | BODY MASS INDEX: 30.05 KG/M2 | OXYGEN SATURATION: 95 % | WEIGHT: 187 LBS | HEART RATE: 60 BPM

## 2021-06-09 LAB — STATUS: NORMAL

## 2021-06-11 DIAGNOSIS — R05.9 COUGH: ICD-10-CM

## 2021-06-11 DIAGNOSIS — J40 BRONCHITIS: ICD-10-CM

## 2021-06-11 NOTE — RESULT ENCOUNTER NOTE
Call and advise patient that the results showed sleep apnea. Per sleep study note: The patient should be started on auto-CPAP at 13-20 with EPR of 3 with a DreamWear nasal mask with heated humidifier.

## 2021-06-14 DIAGNOSIS — Z79.4 DIABETES MELLITUS DUE TO UNDERLYING CONDITION WITH DIABETIC NEUROPATHY, WITH LONG-TERM CURRENT USE OF INSULIN (HCC): ICD-10-CM

## 2021-06-14 DIAGNOSIS — Z79.4 TYPE 2 DIABETES MELLITUS WITH HYPERGLYCEMIA, WITH LONG-TERM CURRENT USE OF INSULIN (HCC): ICD-10-CM

## 2021-06-14 DIAGNOSIS — E08.40 DIABETES MELLITUS DUE TO UNDERLYING CONDITION WITH DIABETIC NEUROPATHY, WITH LONG-TERM CURRENT USE OF INSULIN (HCC): ICD-10-CM

## 2021-06-14 DIAGNOSIS — E11.65 TYPE 2 DIABETES MELLITUS WITH HYPERGLYCEMIA, WITH LONG-TERM CURRENT USE OF INSULIN (HCC): ICD-10-CM

## 2021-06-14 RX ORDER — GLIPIZIDE 5 MG/1
5 TABLET ORAL
Qty: 180 TABLET | Refills: 1 | Status: SHIPPED | OUTPATIENT
Start: 2021-06-14 | End: 2021-06-30 | Stop reason: SDUPTHER

## 2021-06-14 RX ORDER — AZITHROMYCIN 250 MG/1
TABLET, FILM COATED ORAL
Qty: 6 TABLET | Refills: 0 | OUTPATIENT
Start: 2021-06-14

## 2021-06-15 DIAGNOSIS — K21.9 GASTROESOPHAGEAL REFLUX DISEASE WITHOUT ESOPHAGITIS: ICD-10-CM

## 2021-06-15 DIAGNOSIS — Z79.4 TYPE 2 DIABETES MELLITUS WITH HYPERGLYCEMIA, WITH LONG-TERM CURRENT USE OF INSULIN (HCC): ICD-10-CM

## 2021-06-15 DIAGNOSIS — E11.42 DIABETIC POLYNEUROPATHY ASSOCIATED WITH TYPE 2 DIABETES MELLITUS (HCC): ICD-10-CM

## 2021-06-15 DIAGNOSIS — E11.65 TYPE 2 DIABETES MELLITUS WITH HYPERGLYCEMIA, WITH LONG-TERM CURRENT USE OF INSULIN (HCC): ICD-10-CM

## 2021-06-15 RX ORDER — GABAPENTIN 300 MG/1
300 CAPSULE ORAL 3 TIMES DAILY
Qty: 90 CAPSULE | Refills: 2 | Status: SHIPPED | OUTPATIENT
Start: 2021-06-15 | End: 2021-06-30 | Stop reason: SDUPTHER

## 2021-06-15 RX ORDER — PANTOPRAZOLE SODIUM 40 MG/1
40 TABLET, DELAYED RELEASE ORAL DAILY
Qty: 90 TABLET | Refills: 2 | Status: SHIPPED | OUTPATIENT
Start: 2021-06-15 | End: 2021-06-30 | Stop reason: SDUPTHER

## 2021-06-30 ENCOUNTER — OFFICE VISIT (OUTPATIENT)
Dept: PRIMARY CARE CLINIC | Age: 76
End: 2021-06-30
Payer: MEDICARE

## 2021-06-30 VITALS
WEIGHT: 187.2 LBS | OXYGEN SATURATION: 96 % | BODY MASS INDEX: 30.08 KG/M2 | DIASTOLIC BLOOD PRESSURE: 68 MMHG | HEIGHT: 66 IN | HEART RATE: 64 BPM | SYSTOLIC BLOOD PRESSURE: 120 MMHG

## 2021-06-30 DIAGNOSIS — Z78.0 MENOPAUSE: ICD-10-CM

## 2021-06-30 DIAGNOSIS — Z12.39 ENCOUNTER FOR SCREENING FOR MALIGNANT NEOPLASM OF BREAST, UNSPECIFIED SCREENING MODALITY: ICD-10-CM

## 2021-06-30 DIAGNOSIS — K21.9 GASTROESOPHAGEAL REFLUX DISEASE WITHOUT ESOPHAGITIS: ICD-10-CM

## 2021-06-30 DIAGNOSIS — E11.42 DIABETIC POLYNEUROPATHY ASSOCIATED WITH TYPE 2 DIABETES MELLITUS (HCC): ICD-10-CM

## 2021-06-30 DIAGNOSIS — Z00.00 ROUTINE GENERAL MEDICAL EXAMINATION AT A HEALTH CARE FACILITY: ICD-10-CM

## 2021-06-30 DIAGNOSIS — E11.65 TYPE 2 DIABETES MELLITUS WITH HYPERGLYCEMIA, WITH LONG-TERM CURRENT USE OF INSULIN (HCC): ICD-10-CM

## 2021-06-30 DIAGNOSIS — E08.40 DIABETES MELLITUS DUE TO UNDERLYING CONDITION WITH DIABETIC NEUROPATHY, WITH LONG-TERM CURRENT USE OF INSULIN (HCC): ICD-10-CM

## 2021-06-30 DIAGNOSIS — Z79.4 DIABETES MELLITUS DUE TO UNDERLYING CONDITION WITH DIABETIC NEUROPATHY, WITH LONG-TERM CURRENT USE OF INSULIN (HCC): ICD-10-CM

## 2021-06-30 DIAGNOSIS — Z79.4 TYPE 2 DIABETES MELLITUS WITH HYPERGLYCEMIA, WITH LONG-TERM CURRENT USE OF INSULIN (HCC): ICD-10-CM

## 2021-06-30 DIAGNOSIS — Z12.11 SCREEN FOR COLON CANCER: ICD-10-CM

## 2021-06-30 DIAGNOSIS — Z12.31 ENCOUNTER FOR SCREENING MAMMOGRAM FOR MALIGNANT NEOPLASM OF BREAST: Primary | ICD-10-CM

## 2021-06-30 LAB — HBA1C MFR BLD: 8.7 %

## 2021-06-30 PROCEDURE — 3052F HG A1C>EQUAL 8.0%<EQUAL 9.0%: CPT | Performed by: PHYSICIAN ASSISTANT

## 2021-06-30 PROCEDURE — 83036 HEMOGLOBIN GLYCOSYLATED A1C: CPT | Performed by: PHYSICIAN ASSISTANT

## 2021-06-30 PROCEDURE — G0402 INITIAL PREVENTIVE EXAM: HCPCS | Performed by: PHYSICIAN ASSISTANT

## 2021-06-30 RX ORDER — PANTOPRAZOLE SODIUM 40 MG/1
40 TABLET, DELAYED RELEASE ORAL DAILY
Qty: 90 TABLET | Refills: 2 | Status: SHIPPED | OUTPATIENT
Start: 2021-06-30 | End: 2021-10-15 | Stop reason: SDUPTHER

## 2021-06-30 RX ORDER — GEMFIBROZIL 600 MG/1
600 TABLET, FILM COATED ORAL 2 TIMES DAILY
Qty: 90 TABLET | Refills: 1 | Status: SHIPPED | OUTPATIENT
Start: 2021-06-30 | End: 2021-10-15 | Stop reason: SDUPTHER

## 2021-06-30 RX ORDER — GLIPIZIDE 10 MG/1
10 TABLET ORAL
Qty: 90 TABLET | Refills: 2 | Status: SHIPPED | OUTPATIENT
Start: 2021-06-30 | End: 2021-10-15 | Stop reason: SDUPTHER

## 2021-06-30 RX ORDER — GABAPENTIN 300 MG/1
300 CAPSULE ORAL 3 TIMES DAILY
Qty: 270 CAPSULE | Refills: 2 | Status: SHIPPED | OUTPATIENT
Start: 2021-06-30 | End: 2021-10-15 | Stop reason: SDUPTHER

## 2021-06-30 ASSESSMENT — PATIENT HEALTH QUESTIONNAIRE - PHQ9
SUM OF ALL RESPONSES TO PHQ QUESTIONS 1-9: 0
DEPRESSION UNABLE TO ASSESS: PT REFUSES
SUM OF ALL RESPONSES TO PHQ9 QUESTIONS 1 & 2: 0
SUM OF ALL RESPONSES TO PHQ QUESTIONS 1-9: 0
SUM OF ALL RESPONSES TO PHQ QUESTIONS 1-9: 0
2. FEELING DOWN, DEPRESSED OR HOPELESS: 0
1. LITTLE INTEREST OR PLEASURE IN DOING THINGS: 0

## 2021-06-30 NOTE — PROGRESS NOTES
Medicare Annual Wellness Visit  Name: Tono Almeida Date: 2021   MRN: [id number] Sex: Female   Age: 77 y.o. Ethnicity: Non-/Non    : 1945 Race: Ervin Nieto is here for Medicare AWV and Diabetes (3 month check up )    Screenings for behavioral, psychosocial and functional/safety risks, and cognitive dysfunction are all negative except as indicated below. These results, as well as other patient data from the [address] form, are documented in Flowsheets linked to this Encounter. Allergies   Allergen Reactions    Aspirin Other (See Comments)    Influenza Vaccines Other (See Comments)    Penicillins     Pneumococcal Vaccine     Seasonal          Prior to Visit Medications    Medication Sig Taking? Authorizing Provider   pantoprazole (PROTONIX) 40 MG tablet Take 1 tablet by mouth daily Yes MAYRA Lala   gabapentin (NEURONTIN) 300 MG capsule Take 1 capsule by mouth 3 times daily for 90 days.  Yes MAYRA Lala   glipiZIDE (GLUCOTROL) 10 MG tablet Take 1 tablet by mouth 2 times daily (before meals) Yes MAYRA Lala   gemfibrozil (LOPID) 600 MG tablet Take 1 tablet by mouth 2 times daily Yes MAYRA Lala   insulin 70-30 (NOVOLIN 70/30) (70-30) 100 UNIT per ML injection vial Inject 45 Units into the skin 2 times daily Yes MAYRA Lala         Past Medical History:   Diagnosis Date    Diabetes mellitus ([facility] [address]     Seasonal allergies        Past Surgical History:   Procedure Laterality Date    APPENDECTOMY      GALLBLADDER SURGERY      HAND SURGERY N/A     Both hands and right twice     KNEE ARTHROPLASTY      PARTIAL HYSTERECTOMY Right     Age 33. 1 ovary and F. tube    SINUS SURGERY N/A     Two         Family History   Problem Relation Age of Onset    Kidney Disease Mother     Other Mother     Cancer Mother     Heart Disease Father     Stroke Father     Cancer Sister     Cancer Brother        [facility] ([facility]cluding outside providers/suppliers regularly involved in providing care):   Patient Care Team:  MAYRA Lala as PCP - General (Physician Assistant)  MAYRA Lala as PCP - [facility] Provider    Wt Readings from Last 3 Encounters:   06/30/21 187 lb 3.2 oz (84.9 kg)   05/27/21 187 lb (84.8 kg)   03/24/21 187 lb 3.2 oz (84.9 kg)     Vitals:    06/30/21 1312   BP: 120/68   Pulse: 64   SpO2: 96%   Weight: 187 lb 3.2 oz (84.9 kg)   Height: 5' 6\" (1.676 m)     Body mass index is 30.21 kg/m². Based upon direct observation of the patient, evaluation of cognition reveals recent and remote memory intact. General Appearance: alert and oriented to person, place and time, well developed and well- nourished, in no acute distress  Skin: warm and dry, no rash or erythema  Head: normocephalic and atraumatic  Eyes: pupils equal, round, and reactive to light, extraocular eye movements intact, conjunctivae normal  ENT: tympanic membrane, external ear and ear canal normal bilaterally, nose without deformity, nasal mucosa and turbinates normal without polyps  Neck: supple and non-tender without mass, no thyromegaly or thyroid nodules, no cervical lymphadenopathy  Pulmonary/Chest: clear to auscultation bilaterally- no wheezes, rales or rhonchi, normal air movement, no respiratory distress  Cardiovascular: normal rate, regular rhythm, normal S1 and S2, no murmurs, rubs, clicks, or gallops, distal pulses intact, no carotid bruits  Abdomen: soft, non-tender, non-distended, normal bowel sounds, no masses or organomegaly  Extremities: no cyanosis, clubbing or edema  Musculoskeletal: normal range of motion, no joint swelling, deformity or tenderness  Neurologic: reflexes normal and symmetric, no cranial nerve deficit, gait, coordination and speech normal    Patient's complete Health Risk Assessment and screening values have been reviewed and are found in Flowsheets.  The following problems were reviewed today and where indicated follow up appointments were made and/or referrals ordered. Positive Risk Factor Screenings with Interventions:       Depression:  Depression Unable to Assess: Pt refuses  PHQ-2 Score: 0  PHQ-9 Total Score: 0    Severity:1-4 = minimal depression, 5-9 = mild depression, 10-14 = moderate depression, 15-19 = moderately severe depression, 20-27 = severe depression        General Health and ACP:  General  In general, how would you say your health is?: Good  In the past 7 days, have you experienced any of the following?  New or Increased Pain, New or Increased Fatigue, Loneliness, Social Isolation, Stress or Anger?: None of These  Do you get the social and emotional support that you need?: Yes  Do you have a Living Will?: Yes  Advance Directives     Power of [address] [place] [address] Will ACP-Advance Directive ACP-Power of     Not on File Not on File Not on File Not on File      General Health Risk Interventions:  · all okay no concerns     Health Habits/Nutrition:  Health Habits/Nutrition  Do you exercise for at least 20 minutes 2-3 times per week?: (!) No  Have you lost any weight without trying in the past 3 months?: No  Do you eat only one meal per day?: No  Have you seen the dentist within the past year?: (!) No  Body mass index: (!) 30.21  Health Habits/Nutrition Interventions:  · Inadequate physical activity:  patient is not ready to increase his/her physical activity level at this time       Personalized Preventive Plan   Current Health Maintenance Status  Immunization History   Administered Date(s) Administered    COVID-19, Valenzuela Peter, PF, 30mcg/0.3mL 03/29/2021, 04/26/2021    Zoster Live (Zostavax) 11/05/2009        Health Maintenance   Topic Date Due    Hepatitis C screen  Never done    Diabetic foot exam  Never done    Diabetic retinal exam  Never done    DTaP/Tdap/Td vaccine (1 - Tdap) Never done    Colon cancer screen colonoscopy  Never done    DEXA (modify frequency per FRAX score)  Never done  Shingles Vaccine (2 of 3) 12/31/2009    Annual Wellness Visit (AWV)  Never done    A1C test (Diabetic or Prediabetic)  03/24/2022    Lipid screen  03/26/2022    COVID-19 Vaccine  Completed    Hepatitis A vaccine  Aged Out    Hib vaccine  Aged Out    Meningococcal (ACWY) vaccine  Aged Out     Recommendations for [employer] Due: see orders and patient instructions/AVS.  . Recommended screening schedule for the next 5-10 years is provided to the patient in written form: see Patient Cass Nelson was seen today for medicare awv and diabetes. Diagnoses and all orders for this visit:    Routine general medical examination at a health care facility  -     ARMANDO DIGITAL SCREEN SELF REFERRAL W OR WO CAD BILATERAL; Future    Encounter for screening for malignant neoplasm of breast, unspecified screening modality  -     ARMANDO DIGITAL SCREEN SELF REFERRAL W OR WO CAD BILATERAL; Future    Menopause  -     DEXA BONE DENSITY 2 SITES; Future    Screen for colon cancer  -     Cologuard; Future    Gastroesophageal reflux disease without esophagitis  -     pantoprazole (PROTONIX) 40 MG tablet; Take 1 tablet by mouth daily    Type 2 diabetes mellitus with hyperglycemia, with long-term current use of insulin (HCC)  -     gabapentin (NEURONTIN) 300 MG capsule; Take 1 capsule by mouth 3 times daily for 90 days.  -     glipiZIDE (GLUCOTROL) 10 MG tablet; Take 1 tablet by mouth 2 times daily (before meals)  -     gemfibrozil (LOPID) 600 MG tablet; Take 1 tablet by mouth 2 times daily  -     insulin 70-30 (NOVOLIN 70/30) (70-30) 100 UNIT per ML injection vial; Inject 45 Units into the skin 2 times daily    Diabetic polyneuropathy associated with type 2 diabetes mellitus (HCC)  -     gabapentin (NEURONTIN) 300 MG capsule; Take 1 capsule by mouth 3 times daily for 90 days.   -     insulin 70-30 (NOVOLIN 70/30) (70-30) 100 UNIT per ML injection vial; Inject 45 Units into the skin 2 times daily    Diabetes

## 2021-06-30 NOTE — PATIENT INSTRUCTIONS
benefits. Some of the tests and screenings are paid in full while other may be subject to a deductible, co-insurance, and/or copay. Some of these benefits include a comprehensive review of your medical history including lifestyle, illnesses that may run in your family, and various assessments and screenings as appropriate. After reviewing your medical record and screening and assessments performed today your provider may have ordered immunizations, labs, imaging, and/or referrals for you. A list of these orders (if applicable) as well as your Preventive Care list are included within your After Visit Summary for your review. Other Preventive Recommendations:    A preventive eye exam performed by an eye specialist is recommended every 1-2 years to screen for glaucoma; cataracts, macular degeneration, and other eye disorders. A preventive dental visit is recommended every 6 months. Try to get at least 150 minutes of exercise per week or 10,000 steps per day on a pedometer . Order or download the FREE \"Exercise & Physical Activity: Your Everyday Guide\" from [employer]. Call 4-689.727.5709 or search [employer] online. You need 4816-6351 mg of calcium and 1829-0229 IU of vitamin D per day. It is possible to meet your calcium requirement with diet alone, but a vitamin D supplement is usually necessary to meet this goal.  When exposed to the sun, use a sunscreen that protects against both UVA and UVB radiation with an SPF of 30 or greater. Reapply every 2 to 3 hours or after sweating, drying off with a towel, or swimming. Always wear a seat belt when traveling in a car. Always wear a helmet when riding a bicycle or motorcycle.

## 2021-07-09 ENCOUNTER — OFFICE VISIT (OUTPATIENT)
Dept: PRIMARY CARE CLINIC | Age: 76
End: 2021-07-09
Payer: MEDICARE

## 2021-07-09 ENCOUNTER — TELEPHONE (OUTPATIENT)
Dept: PRIMARY CARE CLINIC | Age: 76
End: 2021-07-09

## 2021-07-09 VITALS
SYSTOLIC BLOOD PRESSURE: 128 MMHG | BODY MASS INDEX: 30.08 KG/M2 | HEART RATE: 78 BPM | DIASTOLIC BLOOD PRESSURE: 72 MMHG | HEIGHT: 66 IN | OXYGEN SATURATION: 95 % | WEIGHT: 187.2 LBS

## 2021-07-09 DIAGNOSIS — J01.90 ACUTE BACTERIAL SINUSITIS: ICD-10-CM

## 2021-07-09 DIAGNOSIS — B96.89 ACUTE BACTERIAL SINUSITIS: ICD-10-CM

## 2021-07-09 DIAGNOSIS — H60.313 ACUTE DIFFUSE OTITIS EXTERNA OF BOTH EARS: Primary | ICD-10-CM

## 2021-07-09 PROCEDURE — 99214 OFFICE O/P EST MOD 30 MIN: CPT | Performed by: PHYSICIAN ASSISTANT

## 2021-07-09 RX ORDER — CIPROFLOXACIN/HYDROCORTISONE 0.2 %-1 %
4 SUSPENSION, DROPS(FINAL DOSAGE FORM)(ML) OTIC (EAR) 2 TIMES DAILY
Qty: 10 ML | Refills: 0 | Status: SHIPPED | OUTPATIENT
Start: 2021-07-09 | End: 2021-07-16

## 2021-07-09 RX ORDER — CIPROFLOXACIN 500 MG/1
500 TABLET, FILM COATED ORAL 2 TIMES DAILY
Qty: 14 TABLET | Refills: 0 | Status: SHIPPED | OUTPATIENT
Start: 2021-07-09 | End: 2021-07-16

## 2021-07-09 ASSESSMENT — ENCOUNTER SYMPTOMS
EYE DISCHARGE: 0
VOMITING: 0
ABDOMINAL PAIN: 0
CHEST TIGHTNESS: 0
PHOTOPHOBIA: 0
CONSTIPATION: 0
ABDOMINAL DISTENTION: 0
COUGH: 0
RHINORRHEA: 0
SHORTNESS OF BREATH: 0
SINUS PRESSURE: 0
DIARRHEA: 0
SORE THROAT: 0

## 2021-07-09 NOTE — TELEPHONE ENCOUNTER
----- Message from Jose Alfredo Bridges sent at 7/9/2021  8:36 AM EDT -----  Subject: Appointment Request    Reason for Call: Ear Problem    QUESTIONS  Type of Appointment? Established Patient  Reason for appointment request? No appointments available during search  Additional Information for Provider? patient has ear pain, diffuctly   hearing , possibly swollen , no avail appts today. Pls call her today for   appt. patient screened green  ---------------------------------------------------------------------------  --------------  CALL BACK INFO  What is the best way for the office to contact you? OK to leave message on   voicemail  Preferred Call Back Phone Number? [phone]  ---------------------------------------------------------------------------  --------------  SCRIPT ANSWERS  Relationship to Patient? Self  Appointment reason? Symptomatic  Select script based on patient symptoms? Adult Ear/Hearing Problem  Did you have an injury or trauma within the past three days? No  Is your pain affecting your daily activities? Yes  Have you been diagnosed with, awaiting test results for, or told that you   are suspected of having COVID-19 (Coronavirus)? (If patient has tested   negative or was tested as a requirement for work, school, or travel and   not based on symptoms, answer no)? No  Do you currently have flu-like symptoms including fever or chills, cough,   shortness of breath, difficulty breathing, or new loss of taste or smell? No  Have you had close contact with someone with COVID-19 in the last 14 days? No  (Service Expert  click yes below to proceed with [facility] As Usual   Scheduling)?  Yes

## 2021-07-21 DIAGNOSIS — Z12.11 SCREEN FOR COLON CANCER: ICD-10-CM

## 2021-07-22 ENCOUNTER — TELEPHONE (OUTPATIENT)
Dept: PRIMARY CARE CLINIC | Age: 76
End: 2021-07-22

## 2021-07-22 NOTE — TELEPHONE ENCOUNTER
----- Message from Norma Wright sent at 7/22/2021  3:03 PM EDT -----  Subject: Message to Provider    QUESTIONS  Information for Provider? Patient is calling because she states she needs   PA for her bone dexascan ( taking on 8/10/21)and mammogram(taken on   8/30/21). Her [place] is [employer] [facility]. Please advise  ---------------------------------------------------------------------------  --------------  CALL BACK INFO  What is the best way for the office to contact you? OK to leave message on   voicemail  Preferred Call Back Phone Number? [phone]  ---------------------------------------------------------------------------  --------------  SCRIPT ANSWERS  Relationship to Patient?  Self

## 2021-08-03 DIAGNOSIS — E11.42 DIABETIC POLYNEUROPATHY ASSOCIATED WITH TYPE 2 DIABETES MELLITUS (HCC): ICD-10-CM

## 2021-08-03 DIAGNOSIS — E11.65 TYPE 2 DIABETES MELLITUS WITH HYPERGLYCEMIA, WITH LONG-TERM CURRENT USE OF INSULIN (HCC): ICD-10-CM

## 2021-08-03 DIAGNOSIS — Z79.4 TYPE 2 DIABETES MELLITUS WITH HYPERGLYCEMIA, WITH LONG-TERM CURRENT USE OF INSULIN (HCC): ICD-10-CM

## 2021-08-03 NOTE — TELEPHONE ENCOUNTER
----- Message from Ananya Quezada sent at 8/2/2021  5:44 PM EDT -----  Subject: Refill Request    QUESTIONS  Name of Medication? insulin 70-30 (NOVOLIN 70/30) (70-30) 100 UNIT per ML   injection vial  Patient-reported dosage and instructions? N/a  How many days do you have left? 0  Preferred Pharmacy? [address] [place] [address] phone number (if available)? [phone]  ---------------------------------------------------------------------------  --------------  CALL BACK INFO  What is the best way for the office to contact you? OK to leave message on   voicemail  Preferred Call Back Phone Number?  [phone]

## 2021-10-06 ENCOUNTER — OFFICE VISIT (OUTPATIENT)
Dept: PRIMARY CARE CLINIC | Age: 76
End: 2021-10-06
Payer: COMMERCIAL

## 2021-10-06 VITALS
HEART RATE: 64 BPM | SYSTOLIC BLOOD PRESSURE: 110 MMHG | WEIGHT: 193.2 LBS | DIASTOLIC BLOOD PRESSURE: 60 MMHG | BODY MASS INDEX: 31.05 KG/M2 | HEIGHT: 66 IN

## 2021-10-06 DIAGNOSIS — E11.42 DIABETIC POLYNEUROPATHY ASSOCIATED WITH TYPE 2 DIABETES MELLITUS (HCC): Primary | ICD-10-CM

## 2021-10-06 DIAGNOSIS — Z79.4 TYPE 2 DIABETES MELLITUS WITH HYPERGLYCEMIA, WITH LONG-TERM CURRENT USE OF INSULIN (HCC): ICD-10-CM

## 2021-10-06 DIAGNOSIS — E11.65 TYPE 2 DIABETES MELLITUS WITH HYPERGLYCEMIA, WITH LONG-TERM CURRENT USE OF INSULIN (HCC): ICD-10-CM

## 2021-10-06 DIAGNOSIS — E08.40 DIABETES MELLITUS DUE TO UNDERLYING CONDITION WITH DIABETIC NEUROPATHY, WITH LONG-TERM CURRENT USE OF INSULIN (HCC): ICD-10-CM

## 2021-10-06 DIAGNOSIS — H66.90 ACUTE OTITIS MEDIA, UNSPECIFIED OTITIS MEDIA TYPE: ICD-10-CM

## 2021-10-06 DIAGNOSIS — Z79.4 DIABETES MELLITUS DUE TO UNDERLYING CONDITION WITH DIABETIC NEUROPATHY, WITH LONG-TERM CURRENT USE OF INSULIN (HCC): ICD-10-CM

## 2021-10-06 LAB — HBA1C MFR BLD: 8.1 %

## 2021-10-06 PROCEDURE — 99214 OFFICE O/P EST MOD 30 MIN: CPT | Performed by: PHYSICIAN ASSISTANT

## 2021-10-06 PROCEDURE — 3052F HG A1C>EQUAL 8.0%<EQUAL 9.0%: CPT | Performed by: PHYSICIAN ASSISTANT

## 2021-10-06 PROCEDURE — 83036 HEMOGLOBIN GLYCOSYLATED A1C: CPT | Performed by: PHYSICIAN ASSISTANT

## 2021-10-06 RX ORDER — CIPROFLOXACIN 500 MG/1
500 TABLET, FILM COATED ORAL 2 TIMES DAILY
Qty: 14 TABLET | Refills: 0 | Status: SHIPPED | OUTPATIENT
Start: 2021-10-06 | End: 2021-10-13

## 2021-10-06 RX ORDER — METFORMIN HYDROCHLORIDE 500 MG/1
500 TABLET, EXTENDED RELEASE ORAL
Qty: 30 TABLET | Refills: 0 | Status: SHIPPED | OUTPATIENT
Start: 2021-10-06 | End: 2021-12-22

## 2021-10-06 RX ORDER — METFORMIN HYDROCHLORIDE 500 MG/1
500 TABLET, EXTENDED RELEASE ORAL
Qty: 30 TABLET | Refills: 0 | Status: SHIPPED | OUTPATIENT
Start: 2021-10-06 | End: 2021-10-06

## 2021-10-06 ASSESSMENT — ENCOUNTER SYMPTOMS
SORE THROAT: 0
CHEST TIGHTNESS: 0
EYE DISCHARGE: 0
CONSTIPATION: 0
VOMITING: 0
COUGH: 0
SINUS PRESSURE: 0
DIARRHEA: 0
SHORTNESS OF BREATH: 0
ABDOMINAL DISTENTION: 0
PHOTOPHOBIA: 0
RHINORRHEA: 0
ABDOMINAL PAIN: 0

## 2021-10-06 NOTE — PROGRESS NOTES
[facility] PRIMARY CARE  [address] [facility]  [address] [address]  Dept: [address] [place] [address] Kaylie Clay is a 77 y.o. female Established patient, who presents today for her medical conditions/complaints as noted below. Chief Complaint   Patient presents with    3 Month Follow-Up     Diabetes        HPI:     HPI: The patient is taking glipizide 10 mg twice a day. 45 units of 70/30 once daily. The patient [facility]. Neuropathy is okay. Ran out of gabapentin and did not notice a difference. Patient will try to stop her gabapentin and see if she is able to tolerate the neuropathy.     Reviewed prior notes None  Reviewed previous Labs    LDL Cholesterol (mg/dL)   Date Value   03/26/2021 173 (H)       (goal LDL is <100)   AST (U/L)   Date Value   03/26/2021 19     ALT (U/L)   Date Value   03/26/2021 17     BUN (mg/dL)   Date Value   03/26/2021 15     Hemoglobin A1C (%)   Date Value   10/06/2021 8.1     TSH (mIU/L)   Date Value   03/26/2021 2.76     BP Readings from Last 3 Encounters:   10/06/21 110/60   07/09/21 128/72   06/30/21 120/68          (goal 120/80)    Past Medical History:   Diagnosis Date    Diabetes mellitus ([address]     Seasonal allergies       Past Surgical History:   Procedure Laterality Date    APPENDECTOMY      GALLBLADDER SURGERY      HAND SURGERY N/A     Both hands and right twice     KNEE ARTHROPLASTY      PARTIAL HYSTERECTOMY Right     Age 33. 1 ovary and F. tube    SINUS SURGERY N/A     Two       Family History   Problem Relation Age of Onset    Kidney Disease Mother     Other Mother     Cancer Mother     Heart Disease Father     Stroke Father     Cancer Sister     Cancer Brother        Social History     Tobacco Use    Smoking status: Never Smoker    Smokeless tobacco: Never Used   Substance Use Topics    Alcohol use: Never      Current Outpatient Medications   Medication Sig Dispense Refill    ciprofloxacin (CIPRO) 500 MG tablet Take 1 tablet by mouth 2 times daily for 7 days 14 tablet 0    metFORMIN (GLUCOPHAGE-XR) 500 MG extended release tablet Take 1 tablet by mouth Daily with supper 30 tablet 0    insulin 70-30 (NOVOLIN 70/30) (70-30) 100 UNIT per ML injection vial Inject 45 Units into the skin 2 times daily 2 vial 3    pantoprazole (PROTONIX) 40 MG tablet Take 1 tablet by mouth daily 90 tablet 2    glipiZIDE (GLUCOTROL) 10 MG tablet Take 1 tablet by mouth 2 times daily (before meals) 90 tablet 2    gemfibrozil (LOPID) 600 MG tablet Take 1 tablet by mouth 2 times daily 90 tablet 1    gabapentin (NEURONTIN) 300 MG capsule Take 1 capsule by mouth 3 times daily for 90 days. 270 capsule 2     No current facility-administered medications for this visit. Allergies   Allergen Reactions    Aspirin Other (See Comments)    Influenza Vaccines Other (See Comments)    Penicillins     Pneumococcal Vaccine     Seasonal        Health Maintenance   Topic Date Due    Hepatitis C screen  Never done    Diabetic retinal exam  Never done    DTaP/Tdap/Td vaccine (1 - Tdap) Never done    DEXA (modify frequency per FRAX score)  Never done    Shingles Vaccine (2 of 3) 12/31/2009    Lipid screen  03/26/2022    Diabetic foot exam  10/06/2022    A1C test (Diabetic or Prediabetic)  10/06/2022    Colon cancer screen fecal DNA test (Cologuard)  07/19/2024    COVID-19 Vaccine  Completed    Hepatitis A vaccine  Aged Out    Hib vaccine  Aged Out    Meningococcal (ACWY) vaccine  Aged Out       Subjective:      Review of Systems   Constitutional: Negative for chills, fever and unexpected weight change. HENT: Negative for congestion, hearing loss, rhinorrhea, sinus pressure and sore throat. Eyes: Negative for photophobia, discharge and visual disturbance. Respiratory: Negative for cough, chest tightness and shortness of breath. Cardiovascular: Negative for chest pain, palpitations and leg swelling. Gastrointestinal: Negative for abdominal distention, abdominal pain, constipation, diarrhea and vomiting. Endocrine: Negative for polydipsia and polyuria. Genitourinary: Negative for decreased urine volume, difficulty urinating, frequency and urgency. Musculoskeletal: Positive for arthralgias. Negative for gait problem and myalgias. Skin: Negative for rash. Allergic/Immunologic: Negative for food allergies. Neurological: Negative for dizziness, weakness, numbness and headaches. Hematological: Negative for adenopathy. Psychiatric/Behavioral: Negative for dysphoric mood and sleep disturbance. The patient is not nervous/anxious. Objective:     /60   Pulse 64   Ht 5' 6\" (1.676 m)   Wt 193 lb 3.2 oz (87.6 kg)   BMI 31.18 kg/m²   Physical Exam  Constitutional:       General: She is not in acute distress. Appearance: Normal appearance. She is not ill-appearing. HENT:      Head: Normocephalic and atraumatic. Right Ear: Tympanic membrane, ear canal and external ear normal.      Left Ear: Tympanic membrane, ear canal and external ear normal.      Nose: Nose normal.      Mouth/Throat:      Mouth: Mucous membranes are moist.   Eyes:      Extraocular Movements: Extraocular movements intact. Conjunctiva/sclera: Conjunctivae normal.      Pupils: Pupils are equal, round, and reactive to light. Neck:      Vascular: No carotid bruit. Cardiovascular:      Rate and Rhythm: Normal rate and regular rhythm. Pulses: Normal pulses. Dorsalis pedis pulses are 2+ on the right side and 2+ on the left side. Heart sounds: Normal heart sounds. Pulmonary:      Effort: Pulmonary effort is normal. No respiratory distress. Breath sounds: Normal breath sounds. Abdominal:      General: Bowel sounds are normal. There is no distension. Tenderness: There is no abdominal tenderness. Musculoskeletal:         General: Normal range of motion.       Cervical back: Normal range of motion and neck supple. Right foot: Normal range of motion. No bunion. Left foot: Normal range of motion. No bunion. Feet:      Right foot:      Protective Sensation: 6 sites tested. 6 sites sensed. Skin integrity: Skin integrity normal.      Toenail Condition: Right toenails are normal.      Left foot:      Protective Sensation: 6 sites tested. 6 sites sensed. Skin integrity: Skin integrity normal.      Toenail Condition: Left toenails are normal.   Lymphadenopathy:      Cervical: No cervical adenopathy. Skin:     General: Skin is warm and dry. Neurological:      General: No focal deficit present. Mental Status: She is alert and oriented to person, place, and time. Psychiatric:         Mood and Affect: Mood normal.         Behavior: Behavior normal.         Thought Content: Thought content normal.         Assessment and Plan:          1. Diabetic polyneuropathy associated with type 2 diabetes mellitus ([facility])  -     POCT glycosylated hemoglobin (Hb A1C)  -      DIABETES FOOT EXAM  -     metFORMIN (GLUCOPHAGE-XR) 500 MG extended release tablet; Take 1 tablet by mouth Daily with supper, Disp-30 tablet, R-0Normal  2. Type 2 diabetes mellitus with hyperglycemia, with long-term current use of insulin ([facility])  -     POCT glycosylated hemoglobin (Hb A1C)  -      DIABETES FOOT EXAM  -     metFORMIN (GLUCOPHAGE-XR) 500 MG extended release tablet; Take 1 tablet by mouth Daily with supper, Disp-30 tablet, R-0Normal  3. Diabetes mellitus due to underlying condition with diabetic neuropathy, with long-term current use of insulin ([facility])  -     POCT glycosylated hemoglobin (Hb A1C)  -      DIABETES FOOT EXAM  -     metFORMIN (GLUCOPHAGE-XR) 500 MG extended release tablet; Take 1 tablet by mouth Daily with supper, Disp-30 tablet, R-0Normal  4. Acute otitis media, unspecified otitis media type  -     ciprofloxacin (CIPRO) 500 MG tablet;  Take 1 tablet by mouth 2 times daily for 7 days, Disp-14 tablet, R-0Normal  Patient educated that she can stop her gabapentin and see if she is able to tolerate the neuropathy. Patient will start Cipro and Metformin extended release 1 time daily at night    Patient's A1c was still above 8. Patient will be started on Metformin extended release tablet at night to see if she is able to tolerate it. Patient educated on diet. Patient given educational materials - see patient instructions. Discussed use, benefit, and side effects of prescribed medications. All patient questions answered. Pt voiced understanding. Reviewed health maintenance. Instructed to continue current medications, diet and exercise. Patient agreed with treatment plan. Follow up as directed.      Electronically signed by MAYRA Ruffin on 10/6/2021 at 12:02 PM  a1

## 2021-10-14 DIAGNOSIS — K21.9 GASTROESOPHAGEAL REFLUX DISEASE WITHOUT ESOPHAGITIS: ICD-10-CM

## 2021-10-14 DIAGNOSIS — Z79.4 DIABETES MELLITUS DUE TO UNDERLYING CONDITION WITH DIABETIC NEUROPATHY, WITH LONG-TERM CURRENT USE OF INSULIN (HCC): ICD-10-CM

## 2021-10-14 DIAGNOSIS — E08.40 DIABETES MELLITUS DUE TO UNDERLYING CONDITION WITH DIABETIC NEUROPATHY, WITH LONG-TERM CURRENT USE OF INSULIN (HCC): ICD-10-CM

## 2021-10-14 DIAGNOSIS — E11.65 TYPE 2 DIABETES MELLITUS WITH HYPERGLYCEMIA, WITH LONG-TERM CURRENT USE OF INSULIN (HCC): ICD-10-CM

## 2021-10-14 DIAGNOSIS — E11.42 DIABETIC POLYNEUROPATHY ASSOCIATED WITH TYPE 2 DIABETES MELLITUS (HCC): ICD-10-CM

## 2021-10-14 DIAGNOSIS — Z79.4 TYPE 2 DIABETES MELLITUS WITH HYPERGLYCEMIA, WITH LONG-TERM CURRENT USE OF INSULIN (HCC): ICD-10-CM

## 2021-10-15 RX ORDER — GABAPENTIN 300 MG/1
300 CAPSULE ORAL 3 TIMES DAILY
Qty: 270 CAPSULE | Refills: 2 | Status: SHIPPED | OUTPATIENT
Start: 2021-10-15 | End: 2022-04-25 | Stop reason: ALTCHOICE

## 2021-10-15 RX ORDER — PANTOPRAZOLE SODIUM 40 MG/1
40 TABLET, DELAYED RELEASE ORAL DAILY
Qty: 90 TABLET | Refills: 2 | Status: SHIPPED | OUTPATIENT
Start: 2021-10-15 | End: 2022-04-05 | Stop reason: SDUPTHER

## 2021-10-15 RX ORDER — GLIPIZIDE 10 MG/1
10 TABLET ORAL
Qty: 90 TABLET | Refills: 2 | Status: SHIPPED | OUTPATIENT
Start: 2021-10-15 | End: 2022-01-24 | Stop reason: SDUPTHER

## 2021-10-15 RX ORDER — GEMFIBROZIL 600 MG/1
600 TABLET, FILM COATED ORAL 2 TIMES DAILY
Qty: 90 TABLET | Refills: 1 | Status: SHIPPED | OUTPATIENT
Start: 2021-10-15 | End: 2022-01-24 | Stop reason: SDUPTHER

## 2021-12-20 ENCOUNTER — OFFICE VISIT (OUTPATIENT)
Dept: PRIMARY CARE CLINIC | Age: 76
End: 2021-12-20
Payer: COMMERCIAL

## 2021-12-20 ENCOUNTER — HOSPITAL ENCOUNTER (OUTPATIENT)
Age: 76
Setting detail: SPECIMEN
Discharge: HOME | End: 2021-12-20

## 2021-12-20 VITALS
BODY MASS INDEX: 30.82 KG/M2 | WEIGHT: 191.8 LBS | HEIGHT: 66 IN | DIASTOLIC BLOOD PRESSURE: 70 MMHG | HEART RATE: 82 BPM | OXYGEN SATURATION: 95 % | SYSTOLIC BLOOD PRESSURE: 124 MMHG

## 2021-12-20 DIAGNOSIS — R05.9 COUGH: ICD-10-CM

## 2021-12-20 DIAGNOSIS — J32.9 CHRONIC SINUSITIS, UNSPECIFIED LOCATION: Primary | ICD-10-CM

## 2021-12-20 DIAGNOSIS — J32.9 CHRONIC SINUSITIS, UNSPECIFIED LOCATION: ICD-10-CM

## 2021-12-20 PROCEDURE — 99214 OFFICE O/P EST MOD 30 MIN: CPT | Performed by: PHYSICIAN ASSISTANT

## 2021-12-20 RX ORDER — SULFAMETHOXAZOLE AND TRIMETHOPRIM 800; 160 MG/1; MG/1
1 TABLET ORAL 2 TIMES DAILY
Qty: 20 TABLET | Refills: 0 | Status: SHIPPED | OUTPATIENT
Start: 2021-12-20 | End: 2021-12-30

## 2021-12-20 RX ORDER — PREDNISONE 10 MG/1
10 TABLET ORAL 2 TIMES DAILY
Qty: 10 TABLET | Refills: 0 | Status: SHIPPED | OUTPATIENT
Start: 2021-12-20 | End: 2021-12-25

## 2021-12-20 ASSESSMENT — ENCOUNTER SYMPTOMS
ABDOMINAL DISTENTION: 0
RHINORRHEA: 0
ABDOMINAL PAIN: 0
PHOTOPHOBIA: 0
EYE DISCHARGE: 0
CONSTIPATION: 0
SORE THROAT: 0
CHEST TIGHTNESS: 0
SINUS PRESSURE: 0
SHORTNESS OF BREATH: 1
DIARRHEA: 0
COUGH: 0
VOMITING: 0

## 2021-12-20 NOTE — PROGRESS NOTES
[facility] PRIMARY CARE  [address] [facility]  [address] [address]  Dept: [address] [place] [address] Jesusita Connors is a 77 y.o. female Established patient, who presents today for her medical conditions/complaints as noted below. Chief Complaint   Patient presents with    Sinus Problem     a lot of mucus        HPI:     HPI: Had scratchy throat then coughing up green mucus. Thick mucus. Has tried vicks cold and flu and nyquil. Some SOB, very tired. No fevers. Still has taste and smell.      Reviewed prior notes None  Reviewed previous Labs    LDL Cholesterol (mg/dL)   Date Value   03/26/2021 173 (H)       (goal LDL is <100)   AST (U/L)   Date Value   03/26/2021 19     ALT (U/L)   Date Value   03/26/2021 17     BUN (mg/dL)   Date Value   03/26/2021 15     Hemoglobin A1C (%)   Date Value   10/06/2021 8.1     TSH (mIU/L)   Date Value   03/26/2021 2.76     BP Readings from Last 3 Encounters:   12/20/21 124/70   10/06/21 110/60   07/09/21 128/72          (goal 120/80)    Past Medical History:   Diagnosis Date    Diabetes mellitus ([facility]     Seasonal allergies       Past Surgical History:   Procedure Laterality Date    APPENDECTOMY      GALLBLADDER SURGERY      HAND SURGERY N/A     Both hands and right twice     KNEE ARTHROPLASTY      PARTIAL HYSTERECTOMY Right     Age 33. 1 ovary and F. tube    SINUS SURGERY N/A     Two       Family History   Problem Relation Age of Onset    Kidney Disease Mother     Other Mother     Cancer Mother     Heart Disease Father     Stroke Father     Cancer Sister     Cancer Brother        Social History     Tobacco Use    Smoking status: Never Smoker    Smokeless tobacco: Never Used   Substance Use Topics    Alcohol use: Never      Current Outpatient Medications   Medication Sig Dispense Refill    sulfamethoxazole-trimethoprim (BACTRIM DS;SEPTRA DS) 800-160 MG per tablet Take 1 tablet by mouth 2 times daily for 10 days 20 tablet 0    predniSONE (DELTASONE) 10 MG tablet Take 1 tablet by mouth 2 times daily for 5 days 10 tablet 0    insulin 70-30 (NOVOLIN 70/30) (70-30) 100 UNIT per ML injection vial Inject 45 Units into the skin 2 times daily 2 each 3    pantoprazole (PROTONIX) 40 MG tablet Take 1 tablet by mouth daily 90 tablet 2    gabapentin (NEURONTIN) 300 MG capsule Take 1 capsule by mouth 3 times daily for 90 days. 270 capsule 2    glipiZIDE (GLUCOTROL) 10 MG tablet Take 1 tablet by mouth 2 times daily (before meals) 90 tablet 2    gemfibrozil (LOPID) 600 MG tablet Take 1 tablet by mouth 2 times daily 90 tablet 1    metFORMIN (GLUCOPHAGE-XR) 500 MG extended release tablet Take 1 tablet by mouth Daily with supper 30 tablet 0     No current facility-administered medications for this visit. Allergies   Allergen Reactions    Aspirin Other (See Comments)    Influenza Vaccines Other (See Comments)    Penicillins     Pneumococcal Vaccine     Seasonal        Health Maintenance   Topic Date Due    Hepatitis C screen  Never done    Diabetic retinal exam  Never done    DTaP/Tdap/Td vaccine (1 - Tdap) Never done    DEXA (modify frequency per FRAX score)  Never done    Shingles Vaccine (2 of 3) 12/31/2009    COVID-19 Vaccine (3 - Booster for Pfizer series) 10/26/2021    Lipid screen  03/26/2022    Diabetic foot exam  10/06/2022    A1C test (Diabetic or Prediabetic)  10/06/2022    Colon cancer screen fecal DNA test (Cologuard)  07/19/2024    Hepatitis A vaccine  Aged Out    Hib vaccine  Aged Out    Meningococcal (ACWY) vaccine  Aged Out       Subjective:      Review of Systems   Constitutional: Negative for chills, fever and unexpected weight change. HENT: Negative for congestion, hearing loss, rhinorrhea, sinus pressure and sore throat. Eyes: Negative for photophobia, discharge and visual disturbance. Respiratory: Positive for shortness of breath. Negative for cough and chest tightness.     Cardiovascular: Negative for chest pain, palpitations and leg swelling. Gastrointestinal: Negative for abdominal distention, abdominal pain, constipation, diarrhea and vomiting. Endocrine: Negative for polydipsia and polyuria. Genitourinary: Negative for decreased urine volume, difficulty urinating, frequency and urgency. Musculoskeletal: Negative for arthralgias, gait problem and myalgias. Skin: Negative for rash. Allergic/Immunologic: Negative for food allergies. Neurological: Negative for dizziness, weakness, numbness and headaches. Hematological: Negative for adenopathy. Psychiatric/Behavioral: Negative for dysphoric mood and sleep disturbance. The patient is not nervous/anxious. Objective:     /70   Pulse 82   Ht 5' 6\" (1.676 m)   Wt 191 lb 12.8 oz (87 kg)   SpO2 95%   BMI 30.96 kg/m²   Physical Exam  Constitutional:       General: She is not in acute distress. Appearance: Normal appearance. She is not ill-appearing. HENT:      Head: Normocephalic and atraumatic. Right Ear: External ear normal.      Left Ear: External ear normal.      Nose: Nose normal.      Mouth/Throat:      Mouth: Mucous membranes are moist.   Eyes:      Extraocular Movements: Extraocular movements intact. Conjunctiva/sclera: Conjunctivae normal.      Pupils: Pupils are equal, round, and reactive to light. Neck:      Vascular: No carotid bruit. Cardiovascular:      Rate and Rhythm: Normal rate and regular rhythm. Pulses: Normal pulses. Heart sounds: Normal heart sounds. Pulmonary:      Effort: Pulmonary effort is normal. No respiratory distress. Breath sounds: Normal breath sounds. Abdominal:      General: Bowel sounds are normal. There is no distension. Tenderness: There is no abdominal tenderness. Musculoskeletal:         General: Normal range of motion. Cervical back: Normal range of motion and neck supple. Lymphadenopathy:      Cervical: No cervical adenopathy.    Skin: General: Skin is warm and dry. Neurological:      General: No focal deficit present. Mental Status: She is alert and oriented to person, place, and time. Psychiatric:         Mood and Affect: Mood normal.         Behavior: Behavior normal.         Thought Content: Thought content normal.         Assessment and Plan:          1. Chronic sinusitis, unspecified location  -     COVID-19; Future  -     sulfamethoxazole-trimethoprim (BACTRIM DS;SEPTRA DS) 800-160 MG per tablet; Take 1 tablet by mouth 2 times daily for 10 days, Disp-20 tablet, R-0Normal  -     predniSONE (DELTASONE) 10 MG tablet; Take 1 tablet by mouth 2 times daily for 5 days, Disp-10 tablet, R-0Normal  2. Cough  -     XR CHEST (2 VW); Future  -     predniSONE (DELTASONE) 10 MG tablet; Take 1 tablet by mouth 2 times daily for 5 days, Disp-10 tablet, R-0Normal             Patient given educational materials - see patient instructions. Discussed use, benefit, and side effects of prescribed medications. All patient questions answered. Pt voiced understanding. Reviewed health maintenance. Instructed to continue current medications, diet and exercise. Patient agreed with treatment plan. Follow up as directed.      Electronically signed by MAYRA Gill on 12/20/2021 at 4:35 PM

## 2021-12-21 LAB
SARS-COV-2: NORMAL
SARS-COV-2: NOT DETECTED
SOURCE: NORMAL

## 2021-12-22 ENCOUNTER — TELEPHONE (OUTPATIENT)
Dept: PRIMARY CARE CLINIC | Age: 76
End: 2021-12-22

## 2021-12-22 DIAGNOSIS — Z79.4 DIABETES MELLITUS DUE TO UNDERLYING CONDITION WITH DIABETIC NEUROPATHY, WITH LONG-TERM CURRENT USE OF INSULIN (HCC): ICD-10-CM

## 2021-12-22 DIAGNOSIS — E08.40 DIABETES MELLITUS DUE TO UNDERLYING CONDITION WITH DIABETIC NEUROPATHY, WITH LONG-TERM CURRENT USE OF INSULIN (HCC): ICD-10-CM

## 2021-12-22 DIAGNOSIS — E11.65 TYPE 2 DIABETES MELLITUS WITH HYPERGLYCEMIA, WITH LONG-TERM CURRENT USE OF INSULIN (HCC): ICD-10-CM

## 2021-12-22 DIAGNOSIS — Z79.4 TYPE 2 DIABETES MELLITUS WITH HYPERGLYCEMIA, WITH LONG-TERM CURRENT USE OF INSULIN (HCC): ICD-10-CM

## 2021-12-22 DIAGNOSIS — E11.42 DIABETIC POLYNEUROPATHY ASSOCIATED WITH TYPE 2 DIABETES MELLITUS (HCC): ICD-10-CM

## 2021-12-22 RX ORDER — METFORMIN HYDROCHLORIDE 500 MG/1
TABLET, EXTENDED RELEASE ORAL
Qty: 30 TABLET | Refills: 0 | Status: SHIPPED | OUTPATIENT
Start: 2021-12-22 | End: 2022-01-24 | Stop reason: SDUPTHER

## 2021-12-22 NOTE — TELEPHONE ENCOUNTER
----- Message from Lori Peacock sent at 12/22/2021 12:23 PM EST -----  Subject: Message to Provider    QUESTIONS  Information for Provider? pt would like to know if she will need a pre   authorization for her referral. benefits require authorization if not need   to let pt know  ---------------------------------------------------------------------------  --------------  CALL BACK INFO  What is the best way for the office to contact you? OK to leave message on   voicemail  Preferred Call Back Phone Number? [phone]  ---------------------------------------------------------------------------  --------------  SCRIPT ANSWERS  Relationship to Patient?  Third Party  Representative Name? Nataliya Ramos

## 2022-01-08 ENCOUNTER — APPOINTMENT (OUTPATIENT)
Dept: CT IMAGING | Age: 77
End: 2022-01-08
Payer: MEDICARE

## 2022-01-08 ENCOUNTER — HOSPITAL ENCOUNTER (EMERGENCY)
Age: 77
Discharge: HOME | End: 2022-01-08
Attending: EMERGENCY MEDICINE
Payer: MEDICARE

## 2022-01-08 VITALS
HEART RATE: 69 BPM | SYSTOLIC BLOOD PRESSURE: 111 MMHG | OXYGEN SATURATION: 94 % | RESPIRATION RATE: 18 BRPM | TEMPERATURE: 98.5 F | DIASTOLIC BLOOD PRESSURE: 62 MMHG

## 2022-01-08 DIAGNOSIS — R42 DIZZINESS: ICD-10-CM

## 2022-01-08 DIAGNOSIS — J32.0 CHRONIC MAXILLARY SINUSITIS: ICD-10-CM

## 2022-01-08 DIAGNOSIS — U07.1 COVID-19: Primary | ICD-10-CM

## 2022-01-08 DIAGNOSIS — U07.1 COVID: ICD-10-CM

## 2022-01-08 LAB
ABSOLUTE EOS #: 0 K/UL (ref 0–0.4)
ABSOLUTE IMMATURE GRANULOCYTE: ABNORMAL K/UL (ref 0–0.3)
ABSOLUTE LYMPH #: 1.9 K/UL (ref 1–4.8)
ABSOLUTE MONO #: 0.7 K/UL (ref 0.1–1.3)
ALBUMIN SERPL-MCNC: 4 G/DL (ref 3.5–5.2)
ALBUMIN/GLOBULIN RATIO: ABNORMAL (ref 1–2.5)
ALP BLD-CCNC: 72 U/L (ref 35–104)
ALT SERPL-CCNC: 14 U/L (ref 5–33)
ANION GAP SERPL CALCULATED.3IONS-SCNC: 15 MMOL/L (ref 9–17)
AST SERPL-CCNC: 22 U/L
BASOPHILS # BLD: 1 % (ref 0–2)
BASOPHILS ABSOLUTE: 0.1 K/UL (ref 0–0.2)
BILIRUB SERPL-MCNC: 0.4 MG/DL (ref 0.3–1.2)
BUN BLDV-MCNC: 17 MG/DL (ref 8–23)
BUN/CREAT BLD: ABNORMAL (ref 9–20)
CALCIUM SERPL-MCNC: 9.4 MG/DL (ref 8.6–10.4)
CHLORIDE BLD-SCNC: 94 MMOL/L (ref 98–107)
CO2: 25 MMOL/L (ref 20–31)
CREAT SERPL-MCNC: 0.8 MG/DL (ref 0.5–0.9)
D-DIMER QUANTITATIVE: 3.04 MG/L FEU (ref 0–0.59)
DIFFERENTIAL TYPE: ABNORMAL
EOSINOPHILS RELATIVE PERCENT: 1 % (ref 0–4)
GFR AFRICAN AMERICAN: >60 ML/MIN
GFR NON-AFRICAN AMERICAN: >60 ML/MIN
GFR SERPL CREATININE-BSD FRML MDRD: ABNORMAL ML/MIN/{1.73_M2}
GFR SERPL CREATININE-BSD FRML MDRD: ABNORMAL ML/MIN/{1.73_M2}
GLUCOSE BLD-MCNC: 206 MG/DL (ref 70–99)
HCT VFR BLD CALC: 39.8 % (ref 36–46)
HEMOGLOBIN: 13.6 G/DL (ref 12–16)
IMMATURE GRANULOCYTES: ABNORMAL %
INFLUENZA A: NOT DETECTED
INFLUENZA B: NOT DETECTED
LYMPHOCYTES # BLD: 26 % (ref 24–44)
MCH RBC QN AUTO: 31.6 PG (ref 26–34)
MCHC RBC AUTO-ENTMCNC: 34.2 G/DL (ref 31–37)
MCV RBC AUTO: 92.2 FL (ref 80–100)
MONOCYTES # BLD: 9 % (ref 1–7)
NRBC AUTOMATED: ABNORMAL PER 100 WBC
PDW BLD-RTO: 13.3 % (ref 11.5–14.9)
PLATELET # BLD: 207 K/UL (ref 150–450)
PLATELET ESTIMATE: ABNORMAL
PMV BLD AUTO: 6.7 FL (ref 6–12)
POTASSIUM SERPL-SCNC: 3.7 MMOL/L (ref 3.7–5.3)
RBC # BLD: 4.32 M/UL (ref 4–5.2)
RBC # BLD: ABNORMAL 10*6/UL
SARS-COV-2 RNA, RT PCR: DETECTED
SEG NEUTROPHILS: 63 % (ref 36–66)
SEGMENTED NEUTROPHILS ABSOLUTE COUNT: 4.6 K/UL (ref 1.3–9.1)
SODIUM BLD-SCNC: 134 MMOL/L (ref 135–144)
SOURCE: ABNORMAL
SPECIMEN DESCRIPTION: ABNORMAL
TOTAL PROTEIN: 8.1 G/DL (ref 6.4–8.3)
TROPONIN INTERP: NORMAL
TROPONIN INTERP: NORMAL
TROPONIN T: NORMAL NG/ML
TROPONIN T: NORMAL NG/ML
TROPONIN, HIGH SENSITIVITY: 10 NG/L (ref 0–14)
TROPONIN, HIGH SENSITIVITY: 12 NG/L (ref 0–14)
WBC # BLD: 7.2 K/UL (ref 3.5–11)
WBC # BLD: ABNORMAL 10*3/UL

## 2022-01-08 PROCEDURE — 87636 SARSCOV2 & INF A&B AMP PRB: CPT

## 2022-01-08 PROCEDURE — 85379 FIBRIN DEGRADATION QUANT: CPT

## 2022-01-08 PROCEDURE — 71260 CT THORAX DX C+: CPT

## 2022-01-08 PROCEDURE — 99285 EMERGENCY DEPT VISIT HI MDM: CPT

## 2022-01-08 PROCEDURE — 85025 COMPLETE CBC W/AUTO DIFF WBC: CPT

## 2022-01-08 PROCEDURE — 84484 ASSAY OF TROPONIN QUANT: CPT

## 2022-01-08 PROCEDURE — 96374 THER/PROPH/DIAG INJ IV PUSH: CPT

## 2022-01-08 PROCEDURE — 6370000000 HC RX 637 (ALT 250 FOR IP): Performed by: EMERGENCY MEDICINE

## 2022-01-08 PROCEDURE — 2580000003 HC RX 258: Performed by: EMERGENCY MEDICINE

## 2022-01-08 PROCEDURE — 6360000002 HC RX W HCPCS: Performed by: EMERGENCY MEDICINE

## 2022-01-08 PROCEDURE — 80053 COMPREHEN METABOLIC PANEL: CPT

## 2022-01-08 PROCEDURE — 6360000004 HC RX CONTRAST MEDICATION: Performed by: EMERGENCY MEDICINE

## 2022-01-08 PROCEDURE — 70450 CT HEAD/BRAIN W/O DYE: CPT

## 2022-01-08 PROCEDURE — 36415 COLL VENOUS BLD VENIPUNCTURE: CPT

## 2022-01-08 RX ORDER — DOXYCYCLINE HYCLATE 100 MG
100 TABLET ORAL 2 TIMES DAILY
Qty: 28 TABLET | Refills: 0 | Status: SHIPPED | OUTPATIENT
Start: 2022-01-08 | End: 2022-01-22

## 2022-01-08 RX ORDER — SODIUM CHLORIDE 9 MG/ML
INJECTION, SOLUTION INTRAVENOUS CONTINUOUS
OUTPATIENT
Start: 2022-01-11

## 2022-01-08 RX ORDER — SODIUM CHLORIDE 0.9 % (FLUSH) 0.9 %
10 SYRINGE (ML) INJECTION PRN
Status: DISCONTINUED | OUTPATIENT
Start: 2022-01-08 | End: 2022-01-08 | Stop reason: HOSPADM

## 2022-01-08 RX ORDER — 0.9 % SODIUM CHLORIDE 0.9 %
1000 INTRAVENOUS SOLUTION INTRAVENOUS ONCE
Status: COMPLETED | OUTPATIENT
Start: 2022-01-08 | End: 2022-01-08

## 2022-01-08 RX ORDER — ONDANSETRON 2 MG/ML
4 INJECTION INTRAMUSCULAR; INTRAVENOUS ONCE
Status: COMPLETED | OUTPATIENT
Start: 2022-01-08 | End: 2022-01-08

## 2022-01-08 RX ORDER — 0.9 % SODIUM CHLORIDE 0.9 %
80 INTRAVENOUS SOLUTION INTRAVENOUS ONCE
Status: COMPLETED | OUTPATIENT
Start: 2022-01-08 | End: 2022-01-08

## 2022-01-08 RX ORDER — MECLIZINE HYDROCHLORIDE 25 MG/1
25 TABLET ORAL ONCE
Status: COMPLETED | OUTPATIENT
Start: 2022-01-08 | End: 2022-01-08

## 2022-01-08 RX ORDER — DOXYCYCLINE 100 MG/1
100 CAPSULE ORAL ONCE
Status: COMPLETED | OUTPATIENT
Start: 2022-01-08 | End: 2022-01-08

## 2022-01-08 RX ORDER — MECLIZINE HYDROCHLORIDE CHEWABLE TABLETS 25 MG/1
25 TABLET, CHEWABLE ORAL 3 TIMES DAILY PRN
Qty: 30 TABLET | Refills: 0 | Status: SHIPPED | OUTPATIENT
Start: 2022-01-08 | End: 2022-01-24 | Stop reason: SDUPTHER

## 2022-01-08 RX ADMIN — MECLIZINE HYDROCHLORIDE 25 MG: 25 TABLET ORAL at 18:15

## 2022-01-08 RX ADMIN — SODIUM CHLORIDE 80 ML: 9 INJECTION, SOLUTION INTRAVENOUS at 16:36

## 2022-01-08 RX ADMIN — DOXYCYCLINE 100 MG: 100 CAPSULE ORAL at 20:14

## 2022-01-08 RX ADMIN — IOPAMIDOL 75 ML: 755 INJECTION, SOLUTION INTRAVENOUS at 16:35

## 2022-01-08 RX ADMIN — SODIUM CHLORIDE 1000 ML: 9 INJECTION, SOLUTION INTRAVENOUS at 15:46

## 2022-01-08 RX ADMIN — SODIUM CHLORIDE, PRESERVATIVE FREE 10 ML: 5 INJECTION INTRAVENOUS at 16:35

## 2022-01-08 RX ADMIN — ONDANSETRON 4 MG: 2 INJECTION INTRAMUSCULAR; INTRAVENOUS at 16:09

## 2022-01-08 RX ADMIN — SODIUM CHLORIDE 1000 ML: 9 INJECTION, SOLUTION INTRAVENOUS at 19:51

## 2022-01-08 ASSESSMENT — ENCOUNTER SYMPTOMS
COUGH: 1
DIARRHEA: 0
VOMITING: 0
SHORTNESS OF BREATH: 1

## 2022-01-08 NOTE — ED NOTES
Mode of arrival (squad #, walk in, police, etc) : walk in        Chief complaint(s): Cough, congestion, dizziness        Arrival Note (brief scenario, treatment PTA, etc). : Pt states she has not been feeling well for approx 2 days, pt states she has been exposed to the Flu but believes she has vertigo. C= \"Have you ever felt that you should Cut down on your drinking? \"  No  A= \"Have people Annoyed you by criticizing your drinking? \"  No  G= \"Have you ever felt bad or Guilty about your drinking? \"  No  E= \"Have you ever had a drink as an Eye-opener first thing in the morning to steady your nerves or to help a hangover? \"  No      Deferred []      Reason for deferring: N/A    *If yes to two or more: probable alcohol abuse. *       Agata Philip  01/08/22 1144

## 2022-01-08 NOTE — ED PROVIDER NOTES
[address]  EMERGENCY DEPARTMENT ENCOUNTER      Pt Name: Dianne Bhat  MRN: [id number]  [place] 1945  Date of evaluation: 1/8/22      CHIEF COMPLAINT       Chief Complaint   Patient presents with    Dizziness    Nasal Congestion    Cough     HISTORY OF PRESENT ILLNESS   HPI 77 y.o. female presents with c/o sob, cough and congestion. Pt reports that she started getting sick about 2 weeks ago. Her pcp gave her a course of steroids and antibiotics. She got better after about 10 days, but then on the evening of 1/5/21 she started getting sick again. She did have her covid vaccination, but she did not get the booster yet. No known covid contacts. REVIEW OF SYSTEMS       Review of Systems   Constitutional: Positive for activity change, appetite change and fatigue. HENT: Positive for congestion. Eyes: Negative for visual disturbance. Respiratory: Positive for cough and shortness of breath. Cardiovascular: Negative for chest pain. Gastrointestinal: Negative for diarrhea and vomiting. Endocrine: Negative for polyuria. Genitourinary: Negative for difficulty urinating. Musculoskeletal: Positive for myalgias. Skin: Negative for rash. Neurological: Negative for headaches.        PAST MEDICAL HISTORY     Past Medical History:   Diagnosis Date    COVID 1/8/2022    COVID 1/8/2022    Diabetes mellitus ([address]     Seasonal allergies        SURGICAL HISTORY       Past Surgical History:   Procedure Laterality Date    APPENDECTOMY      GALLBLADDER SURGERY      HAND SURGERY N/A     Both hands and right twice     KNEE ARTHROPLASTY      PARTIAL HYSTERECTOMY Right     Age 33. 1 ovary and F. tube    SINUS SURGERY N/A     Two       CURRENT MEDICATIONS       Discharge Medication List as of 1/8/2022  8:13 PM      CONTINUE these medications which have NOT CHANGED    Details   metFORMIN (GLUCOPHAGE-XR) 500 MG extended release tablet Take 1 tablet by mouth once daily with breakfast, Disp-30 tablet, R-0Normal      insulin 70-30 (NOVOLIN 70/30) (70-30) 100 UNIT per ML injection vial Inject 45 Units into the skin 2 times daily, Disp-2 each, R-3Normal      pantoprazole (PROTONIX) 40 MG tablet Take 1 tablet by mouth daily, Disp-90 tablet, R-2Normal      gabapentin (NEURONTIN) 300 MG capsule Take 1 capsule by mouth 3 times daily for 90 days. , Disp-270 capsule, R-2Normal      glipiZIDE (GLUCOTROL) 10 MG tablet Take 1 tablet by mouth 2 times daily (before meals), Disp-90 tablet, R-2Normal      gemfibrozil (LOPID) 600 MG tablet Take 1 tablet by mouth 2 times daily, Disp-90 tablet, R-1Normal             ALLERGIES     is allergic to aspirin, influenza vaccines, penicillins, pneumococcal vaccine, and seasonal.    FAMILY HISTORY     She indicated that the status of her mother is unknown. She indicated that the status of her father is unknown. She indicated that the status of her sister is unknown. She indicated that the status of her brother is unknown. SOCIAL HISTORY      reports that she has never smoked. She has never used smokeless tobacco. She reports that she does not drink alcohol and does not use drugs. PHYSICAL EXAM     INITIAL VITALS: /62   Pulse 69   Temp 98.5 °F (36.9 °C)   Resp 18   SpO2 94%   Gen: nad  Head: Normocephalic, atraumatic  Eye: Pupils equal round reactive to light, no conjunctivitis  ENT: MMM  Neck: no JVD  Heart: Regular rate and rhythm no murmurs  Lungs: Clear to auscultation bilaterally, no respiratory distress  Chest wall: No crepitus, no tenderness palpation  Abdomen: Soft, nontender, nondistended, with no peritoneal signs  Neurologic: Patient is alert and oriented x3, motor and sensation is intact in all 4 extremities, fluent speech  Extremities: no edema, no calf tenderness to palpation    MEDICAL DECISION MAKING:     MDM  77 y.o. female presenting with sob. Differential diagnosis of CHF, PNA, COVID  pulmonary embolismi, anemia, renal failure, metabolic acidosis. We'll obtain a cbc, cmp, ekg, troponin, bnp, and chest xray. Pt treated with IVF. The patient is put on a cardiac, O2 monitor. We will monitor closely and reassess. Emergency Department course:    Covid is positive  D-dimer is elevated and so we obtained a CT PE of the chest, that showed no pulmonary embolism, but did show her Covid pneumonia. Laboratory studies show no troponin elevation to suggest an acute coronary syndrome. The patient is not anemic. Patient has normal renal function and electrolytes. The patient is not hypoxic. I discussed warning precautions with the patient and her son. I also discussed with pharmacy at [facility] and they will contact the patient regarding sotrovimab infusion. 6:20 PM EST  Pt remains unsteady when attempting to walk. Her coordination is intact to finger-nose testing and heal shin testing. We'll get a ct scan of her head, and treat her with meclizine. I suspect she may have a viral vestibular neuritis. 8 PM.  The CT scan of the head shows moderate to severe paranasal sinus disease. She has had this upper respiratory infection for about 2 weeks and so she may have an acute bacterial sinusitis on top of her Covid. We will start her on a course of antibiotics. She is able to walk with assistance. Again I do not see any sign of a stroke. D/w pt and her son  the results, treatment plan, warning precautions for prompt ED return and importance of close OP FU, they verbalize understanding and agrees with the treatment plan. DIAGNOSTIC RESULTS     RADIOLOGY:All plain film, CT, MRI, and formal ultrasound images (except ED bedside ultrasound) are read by the radiologist and the images and interpretations are directly viewed by the emergency physician. CT HEAD WO CONTRAST   Final Result   No acute intracranial abnormality. Chronic microvascular disease.       Moderate to severe paranasal sinuses with multifocal air-fluid levels can be seen with acute bacterial sinusitis. Please correlate exam findings and   consider treatment as clinically warranted. RECOMMENDATIONS:   Unavailable         CT CHEST PULMONARY EMBOLISM W CONTRAST   Final Result   1. No evidence of pulmonary embolism. 2.  Imaging features can be seen with COVID-19 pneumonia, though are   nonspecific and can occur with a variety of infectious and noninfectious   processes. PneInd             LABS: All lab results were reviewed by myself, and all abnormals are listed below.   Labs Reviewed   COVID-19 & INFLUENZA COMBO - Abnormal; Notable for the following components:       Result Value    SARS-CoV-2 RNA, RT PCR DETECTED (*)     All other components within normal limits   CBC WITH AUTO DIFFERENTIAL - Abnormal; Notable for the following components:    Monocytes 9 (*)     All other components within normal limits   COMPREHENSIVE METABOLIC PANEL - Abnormal; Notable for the following components:    Glucose 206 (*)     Sodium 134 (*)     Chloride 94 (*)     All other components within normal limits   D-DIMER, QUANTITATIVE - Abnormal; Notable for the following components:    D-Dimer, Quant 3.04 (*)     All other components within normal limits   TROPONIN   TROPONIN       EMERGENCY DEPARTMENT COURSE:   Vitals:    Vitals:    01/08/22 1654 01/08/22 1745 01/08/22 1945 01/08/22 2020   BP: (!) 103/40 (!) 110/46 106/89 111/62   Pulse: 64  66 69   Resp: 14  22 18   Temp:       SpO2: 92%   94%       The patient was given the following medications while in the emergency department:  Orders Placed This Encounter   Medications    0.9 % sodium chloride bolus    ondansetron (ZOFRAN) injection 4 mg    0.9 % sodium chloride bolus    iopamidol (ISOVUE-370) 76 % injection 75 mL    DISCONTD: sodium chloride flush 0.9 % injection 10 mL    meclizine (ANTIVERT) tablet 25 mg    0.9 % sodium chloride bolus    doxycycline hyclate (VIBRA-TABS) 100 MG tablet     Sig: Take 1 tablet by mouth 2 times daily for 14 days     Dispense:  28 tablet     Refill:  0    meclizine (ANTIVERT) 25 MG CHEW     Sig: Take 1 tablet by mouth 3 times daily as needed (dizziness)     Dispense:  30 tablet     Refill:  0    doxycycline monohydrate (MONODOX) capsule 100 mg     Order Specific Question:   Antimicrobial Indications     Answer:   Head and Neck Infection     -------------------------  CRITICAL CARE:   CONSULTS: None  PROCEDURES: Procedures     FINAL IMPRESSION      1. COVID-19    2. Dizziness    3.  Chronic maxillary sinusitis          DISPOSITION/PLAN   DISPOSITION        PATIENT REFERRED TO:  [facility] ED  [facility] [address]  [address] [id number] [phone]    If symptoms worsen    Kee Schilling, [address] [facility]  Arna [address] [facility] [phone]    In 1 week        DISCHARGE MEDICATIONS:  Discharge Medication List as of 1/8/2022  8:13 PM      START taking these medications    Details   doxycycline hyclate (VIBRA-TABS) 100 MG tablet Take 1 tablet by mouth 2 times daily for 14 days, Disp-28 tablet, R-0Normal      meclizine (ANTIVERT) 25 MG CHEW Take 1 tablet by mouth 3 times daily as needed (dizziness), Disp-30 tablet, R-0Normal               Kelly Alexis MD  Attending Emergency Physician                      Kelly Alexis MD  01/09/22 2355

## 2022-01-09 NOTE — ED NOTES
DC instructions reviewed and pt verbalize understanding of f/u care and reasons to return to ED.  DC ROMELIA in stable condition with all belongings into car with son     [facility]  01/08/22 2042

## 2022-01-09 NOTE — ED NOTES
Assumed pt care. Agree with prev caregiver note.  Pt stable in room, resting in bed     Edgar Peñaloza RN  01/08/22 1952

## 2022-01-09 NOTE — ED NOTES
Dizziness tolerable to compete walking pulse ox  See flowsheet for vitals  S/w MD, IVF infusing well     Yoni Toribio, KINSEY  01/08/22 1952

## 2022-01-10 ENCOUNTER — CARE COORDINATION (OUTPATIENT)
Dept: CARE COORDINATION | Age: 77
End: 2022-01-10

## 2022-01-10 NOTE — CARE COORDINATION
Patient contacted regarding COVID-19 diagnosis. Discussed COVID-19 related testing which was available at this time. Test results were positive. Patient informed of results, if available? Yes. Ambulatory Care Manager contacted the patient by telephone to perform post discharge assessment. Call within 2 business days of discharge: Yes. Verified name and  with patient as identifiers. Provided introduction to self, and explanation of the CTN/ACM role, and reason for call due to risk factors for infection and/or exposure to COVID-19. Symptoms reviewed with patient who verbalized the following symptoms: no new symptoms and no worsening symptoms. Due to no new or worsening symptoms encounter was not routed to provider for escalation. Discussed follow-up appointments. If no appointment was previously scheduled, appointment scheduling offered: Yes. [facility] follow up appointment(s): No future appointments. Non-[facility] follow up appointment(s):     Non-face-to-face services provided:  Obtained and reviewed discharge summary and/or continuity of care documents  Education of patient/family/caregiver/guardian to support self-management-done  Assessment and support for treatment adherence and medication management-done     Advance Care Planning:   Does patient have an Advance Directive:  decision maker updated. Educated patient about risk for severe COVID-19 due to risk factors according to CDC guidelines. ACM reviewed discharge instructions, medical action plan and red flag symptoms with the patient who verbalized understanding. Discussed COVID vaccination status: Yes. Education provided on COVID-19 vaccination as appropriate. Discussed exposure protocols and quarantine with CDC Guidelines. Patient was given an opportunity to verbalize any questions and concerns and agrees to contact ACM or health care provider for questions related to their healthcare.     Reviewed and educated patient on any new and changed medications related to discharge diagnosis     Was patient discharged with a pulse oximeter? No Discussed and confirmed pulse oximeter discharge instructions and when to notify provider or seek emergency care. ACM provided contact information. Plan for follow-up call in 5-7 days based on severity of symptoms and risk factors. ACM follow up call for ED visit/COVID 19 monitoring to pt with c/o dizziness, cough,nasal congestion. COVID test Positive. Antibody infusion pending. ACM instructed patient to increase fluids, rest, take tylenol  or ibuprofen for body aches or fever unless contraindicated. Return to ED with any increased shortness of breath, chest pain or sudden leg pain. Reviewed COVID Zones with patient.

## 2022-01-11 ENCOUNTER — HOSPITAL ENCOUNTER (OUTPATIENT)
Dept: INFUSION THERAPY | Age: 77
Setting detail: INFUSION SERIES
Discharge: HOME | End: 2022-01-11
Attending: INTERNAL MEDICINE
Payer: MEDICARE

## 2022-01-11 VITALS
TEMPERATURE: 97.5 F | HEART RATE: 51 BPM | OXYGEN SATURATION: 94 % | SYSTOLIC BLOOD PRESSURE: 111 MMHG | RESPIRATION RATE: 16 BRPM | DIASTOLIC BLOOD PRESSURE: 59 MMHG

## 2022-01-11 DIAGNOSIS — U07.1 COVID: Primary | ICD-10-CM

## 2022-01-11 DIAGNOSIS — U07.1 COVID-19: ICD-10-CM

## 2022-01-11 PROCEDURE — 96365 THER/PROPH/DIAG IV INF INIT: CPT

## 2022-01-11 PROCEDURE — 2500000003 HC RX 250 WO HCPCS: Performed by: EMERGENCY MEDICINE

## 2022-01-11 RX ORDER — SODIUM CHLORIDE 9 MG/ML
INJECTION, SOLUTION INTRAVENOUS CONTINUOUS
OUTPATIENT
Start: 2022-01-11

## 2022-01-11 RX ADMIN — Medication 500 MG: at 11:31

## 2022-01-11 NOTE — DISCHARGE SUMMARY
Patient here for Covid 19 Monoclonal antibody infusion. Patient observed for one hour post infusion. Patient tolerated well with vital signs as charted. Patient given discharge instructions and education. Provided patient with pulse ox with education for home use.

## 2022-01-13 ENCOUNTER — CARE COORDINATION (OUTPATIENT)
Dept: CARE COORDINATION | Age: 77
End: 2022-01-13

## 2022-01-13 NOTE — CARE COORDINATION
Patient contacted regarding COVID-19 diagnosis and monoclonal antibody infusion follow up. Discussed COVID-19 related testing which was available at this time. Test results were positive. Patient informed of results, if available? NA    Ambulatory Care Manager contacted the patient by telephone to perform follow-up assessment. Verified name and  with patient as identifiers. Patient has following risk factors of: diabetes. Symptoms reviewed with patient who verbalized the following symptoms: no new symptoms and no worsening symptoms. Due to no new or worsening symptoms encounter was not routed to provider for escalation. Educated patient about risk for severe COVID-19 due to risk factors according to CDC guidelines. ACM reviewed discharge instructions, medical action plan and red flag symptoms with the patient who verbalized understanding. Discussed COVID vaccination status: Yes. Education provided on COVID-19 vaccination as appropriate. Discussed exposure protocols and quarantine with CDC Guidelines. Patient was given an opportunity to verbalize any questions and concerns and agrees to contact ACM or health care provider for questions related to their healthcare. Was patient discharged with a pulse oximeter? No Discussed and confirmed pulse oximeter discharge instructions and when to notify provider or seek emergency care. ACM provided contact information. Plan for follow-up call in 5-7 days based on severity of symptoms and risk factors. ACM instructed patient to increase fluids, rest, take tylenol  or ibuprofen for body aches or fever unless contraindicated. Return to ED with any increased shortness of breath, chest pain or sudden leg pain. Reviewed COVID Zones with patient.

## 2022-01-24 ENCOUNTER — OFFICE VISIT (OUTPATIENT)
Dept: PRIMARY CARE CLINIC | Age: 77
End: 2022-01-24
Payer: MEDICARE

## 2022-01-24 VITALS
WEIGHT: 190.4 LBS | DIASTOLIC BLOOD PRESSURE: 62 MMHG | HEIGHT: 66 IN | SYSTOLIC BLOOD PRESSURE: 116 MMHG | BODY MASS INDEX: 30.6 KG/M2 | HEART RATE: 82 BPM | OXYGEN SATURATION: 98 %

## 2022-01-24 DIAGNOSIS — B96.89 ACUTE BACTERIAL SINUSITIS: Primary | ICD-10-CM

## 2022-01-24 DIAGNOSIS — E08.40 DIABETES MELLITUS DUE TO UNDERLYING CONDITION WITH DIABETIC NEUROPATHY, WITH LONG-TERM CURRENT USE OF INSULIN (HCC): ICD-10-CM

## 2022-01-24 DIAGNOSIS — J01.90 ACUTE BACTERIAL SINUSITIS: Primary | ICD-10-CM

## 2022-01-24 DIAGNOSIS — G47.33 OSA (OBSTRUCTIVE SLEEP APNEA): ICD-10-CM

## 2022-01-24 DIAGNOSIS — E11.65 TYPE 2 DIABETES MELLITUS WITH HYPERGLYCEMIA, WITH LONG-TERM CURRENT USE OF INSULIN (HCC): ICD-10-CM

## 2022-01-24 DIAGNOSIS — Z79.4 DIABETES MELLITUS DUE TO UNDERLYING CONDITION WITH DIABETIC NEUROPATHY, WITH LONG-TERM CURRENT USE OF INSULIN (HCC): ICD-10-CM

## 2022-01-24 DIAGNOSIS — R42 VERTIGO: ICD-10-CM

## 2022-01-24 DIAGNOSIS — E11.42 DIABETIC POLYNEUROPATHY ASSOCIATED WITH TYPE 2 DIABETES MELLITUS (HCC): ICD-10-CM

## 2022-01-24 DIAGNOSIS — Z79.4 TYPE 2 DIABETES MELLITUS WITH HYPERGLYCEMIA, WITH LONG-TERM CURRENT USE OF INSULIN (HCC): ICD-10-CM

## 2022-01-24 LAB — HBA1C MFR BLD: 7.1 %

## 2022-01-24 PROCEDURE — 99214 OFFICE O/P EST MOD 30 MIN: CPT | Performed by: PHYSICIAN ASSISTANT

## 2022-01-24 PROCEDURE — 83036 HEMOGLOBIN GLYCOSYLATED A1C: CPT | Performed by: PHYSICIAN ASSISTANT

## 2022-01-24 RX ORDER — GLIPIZIDE 10 MG/1
10 TABLET ORAL
Qty: 90 TABLET | Refills: 2 | Status: SHIPPED | OUTPATIENT
Start: 2022-01-24 | End: 2022-06-28

## 2022-01-24 RX ORDER — SYRINGE-NEEDLE,INSULIN,0.5 ML 30 G X1/2"
SYRINGE, EMPTY DISPOSABLE MISCELLANEOUS
Qty: 100 EACH | Refills: 3 | Status: SHIPPED | OUTPATIENT
Start: 2022-01-24

## 2022-01-24 RX ORDER — DOXYCYCLINE HYCLATE 100 MG
100 TABLET ORAL 2 TIMES DAILY
Qty: 14 TABLET | Refills: 0 | Status: SHIPPED | OUTPATIENT
Start: 2022-01-24 | End: 2022-01-31

## 2022-01-24 RX ORDER — GEMFIBROZIL 600 MG/1
600 TABLET, FILM COATED ORAL 2 TIMES DAILY
Qty: 90 TABLET | Refills: 1 | Status: SHIPPED | OUTPATIENT
Start: 2022-01-24 | End: 2022-05-31

## 2022-01-24 RX ORDER — METFORMIN HYDROCHLORIDE 500 MG/1
TABLET, EXTENDED RELEASE ORAL
Qty: 30 TABLET | Refills: 0 | Status: SHIPPED | OUTPATIENT
Start: 2022-01-24 | End: 2022-02-14

## 2022-01-24 RX ORDER — GLUCOSAMINE HCL/CHONDROITIN SU 500-400 MG
CAPSULE ORAL
Qty: 300 STRIP | Refills: 2 | Status: SHIPPED | OUTPATIENT
Start: 2022-01-24

## 2022-01-24 RX ORDER — MECLIZINE HYDROCHLORIDE CHEWABLE TABLETS 25 MG/1
25 TABLET, CHEWABLE ORAL 3 TIMES DAILY PRN
Qty: 30 TABLET | Refills: 0 | Status: SHIPPED | OUTPATIENT
Start: 2022-01-24 | End: 2022-10-31 | Stop reason: SDUPTHER

## 2022-01-24 ASSESSMENT — ENCOUNTER SYMPTOMS
SORE THROAT: 0
CHEST TIGHTNESS: 0
RHINORRHEA: 0
COUGH: 0
SINUS PRESSURE: 0
ABDOMINAL PAIN: 0
PHOTOPHOBIA: 0
EYE DISCHARGE: 0
CONSTIPATION: 0
VOMITING: 0
ABDOMINAL DISTENTION: 0
DIARRHEA: 0
SHORTNESS OF BREATH: 0

## 2022-01-24 NOTE — PROGRESS NOTES
[facility] PRIMARY CARE  [address] [address]  [address] [address]  Dept: [address] [place] [address] Ralph Atkinson is a 77 y.o. female Established patient, who presents today for her medical conditions/complaints as noted below. Chief Complaint   Patient presents with    Other     Ed follow up        HPI:     HPI: The patient had covid. Went to ER. Had severe dizziness. Has sinus infection. Got a chest scan no pneumonia. Started taking doxycyline and meclazine. Congestion is better. Today is last day of doxycycline. Still having phlegm in throat. Had IV injection for covid. Used to go to ENT. Has had two sinus surgeries. Had A1c done.      Reviewed prior notes None  Reviewed previous Labs    LDL Cholesterol (mg/dL)   Date Value   03/26/2021 173 (H)       (goal LDL is <100)   AST (U/L)   Date Value   01/08/2022 22     ALT (U/L)   Date Value   01/08/2022 14     BUN (mg/dL)   Date Value   01/08/2022 17     Hemoglobin A1C (%)   Date Value   10/06/2021 8.1     TSH (mIU/L)   Date Value   03/26/2021 2.76     BP Readings from Last 3 Encounters:   01/24/22 116/62   01/11/22 (!) 111/59   01/08/22 111/62          (goal 120/80)    Past Medical History:   Diagnosis Date    COVID 1/8/2022    COVID 1/8/2022    Diabetes mellitus ([address]     Seasonal allergies       Past Surgical History:   Procedure Laterality Date    APPENDECTOMY      GALLBLADDER SURGERY      HAND SURGERY N/A     Both hands and right twice     KNEE ARTHROPLASTY      PARTIAL HYSTERECTOMY Right     Age 33. 1 ovary and F. tube    SINUS SURGERY N/A     Two       Family History   Problem Relation Age of Onset    Kidney Disease Mother     Other Mother     Cancer Mother     Heart Disease Father     Stroke Father     Cancer Sister     Cancer Brother        Social History     Tobacco Use    Smoking status: Never Smoker    Smokeless tobacco: Never Used   Substance Use Topics    Alcohol use: Never      Current Outpatient Medications   Medication Sig Dispense Refill    doxycycline hyclate (VIBRA-TABS) 100 MG tablet Take 1 tablet by mouth 2 times daily for 7 days 14 tablet 0    metFORMIN (GLUCOPHAGE-XR) 500 MG extended release tablet Take 1 tablet by mouth once daily with breakfast 30 tablet 0    insulin 70-30 (NOVOLIN 70/30) (70-30) 100 UNIT per ML injection vial Inject 45 Units into the skin 2 times daily 2 each 3    glipiZIDE (GLUCOTROL) 10 MG tablet Take 1 tablet by mouth 2 times daily (before meals) 90 tablet 2    gemfibrozil (LOPID) 600 MG tablet Take 1 tablet by mouth 2 times daily 90 tablet 1    blood glucose monitor strips Test 3 times a day & as needed for symptoms of irregular blood glucose. Dispense sufficient amount for indicated testing frequency plus additional to accommodate PRN testing needs. 300 strip 2    Insulin Syringe-Needle U-100 (B-D INS SYR ULTRAFINE .5CC/30G) 30G X 1/2\" 0.5 ML MISC 1/2cc/ml syringe with 32g x 8mm needle. May use any brand  E11.9 100 each 3    meclizine (ANTIVERT) 25 MG CHEW Take 1 tablet by mouth 3 times daily as needed (dizziness) 30 tablet 0    pantoprazole (PROTONIX) 40 MG tablet Take 1 tablet by mouth daily 90 tablet 2    gabapentin (NEURONTIN) 300 MG capsule Take 1 capsule by mouth 3 times daily for 90 days. 270 capsule 2     No current facility-administered medications for this visit.      Allergies   Allergen Reactions    Aspirin Other (See Comments)    Influenza Vaccines Other (See Comments)    Penicillins     Pneumococcal Vaccine     Seasonal        Health Maintenance   Topic Date Due    Hepatitis C screen  Never done    Diabetic retinal exam  Never done    DTaP/Tdap/Td vaccine (1 - Tdap) Never done    DEXA (modify frequency per FRAX score)  Never done    Shingles Vaccine (2 of 3) 12/31/2009    COVID-19 Vaccine (3 - Booster for Pfizer series) 09/26/2021    Lipid screen  03/26/2022    Depression Screen  06/30/2022    Annual Wellness Visit (AWV)  07/01/2022    Diabetic foot exam  10/06/2022    A1C test (Diabetic or Prediabetic)  10/06/2022    Colon cancer screen fecal DNA test (Cologuard)  07/19/2024    Hepatitis A vaccine  Aged Out    Hib vaccine  Aged Out    Meningococcal (ACWY) vaccine  Aged Out       Subjective:      Review of Systems   Constitutional: Negative for chills, fever and unexpected weight change. HENT: Negative for congestion, hearing loss, rhinorrhea, sinus pressure and sore throat. Eyes: Negative for photophobia, discharge and visual disturbance. Respiratory: Negative for cough, chest tightness and shortness of breath. Cardiovascular: Negative for chest pain, palpitations and leg swelling. Gastrointestinal: Negative for abdominal distention, abdominal pain, constipation, diarrhea and vomiting. Endocrine: Negative for polydipsia and polyuria. Genitourinary: Negative for decreased urine volume, difficulty urinating, frequency and urgency. Musculoskeletal: Negative for arthralgias, gait problem and myalgias. Skin: Negative for rash. Allergic/Immunologic: Negative for food allergies. Neurological: Negative for dizziness, weakness, numbness and headaches. Hematological: Negative for adenopathy. Psychiatric/Behavioral: Negative for dysphoric mood and sleep disturbance. The patient is not nervous/anxious. Objective:     /62   Pulse 82   Ht 5' 6\" (1.676 m)   Wt 190 lb 6.4 oz (86.4 kg)   SpO2 98%   BMI 30.73 kg/m²   Physical Exam  Constitutional:       General: She is not in acute distress. Appearance: Normal appearance. She is not ill-appearing. HENT:      Head: Normocephalic and atraumatic. Right Ear: Tympanic membrane, ear canal and external ear normal.      Left Ear: Tympanic membrane, ear canal and external ear normal.      Nose: Nose normal.      Mouth/Throat:      Mouth: Mucous membranes are moist.   Eyes:      Extraocular Movements: Extraocular movements intact.       Conjunctiva/sclera: Conjunctivae normal.      Pupils: Pupils are equal, round, and reactive to light. Neck:      Vascular: No carotid bruit. Cardiovascular:      Rate and Rhythm: Normal rate and regular rhythm. Pulses: Normal pulses. Heart sounds: Normal heart sounds. Pulmonary:      Effort: Pulmonary effort is normal. No respiratory distress. Breath sounds: Normal breath sounds. Abdominal:      General: Bowel sounds are normal. There is no distension. Tenderness: There is no abdominal tenderness. Musculoskeletal:         General: Normal range of motion. Cervical back: Normal range of motion and neck supple. Lymphadenopathy:      Cervical: No cervical adenopathy. Skin:     General: Skin is warm and dry. Neurological:      General: No focal deficit present. Mental Status: She is alert and oriented to person, place, and time. Psychiatric:         Mood and Affect: Mood normal.         Behavior: Behavior normal.         Thought Content: Thought content normal.         Assessment and Plan:          1. Acute bacterial sinusitis  -     doxycycline hyclate (VIBRA-TABS) 100 MG tablet; Take 1 tablet by mouth 2 times daily for 7 days, Disp-14 tablet, R-0Normal  2. DAREK (obstructive sleep apnea)  3. Type 2 diabetes mellitus with hyperglycemia, with long-term current use of insulin ([facility])  -     POCT glycosylated hemoglobin (Hb A1C)  -     metFORMIN (GLUCOPHAGE-XR) 500 MG extended release tablet; Take 1 tablet by mouth once daily with breakfast, Disp-30 tablet, R-0Normal  -     insulin 70-30 (NOVOLIN 70/30) (70-30) 100 UNIT per ML injection vial; Inject 45 Units into the skin 2 times daily, Disp-2 each, R-3Normal  -     glipiZIDE (GLUCOTROL) 10 MG tablet; Take 1 tablet by mouth 2 times daily (before meals), Disp-90 tablet, R-2Normal  -     gemfibrozil (LOPID) 600 MG tablet; Take 1 tablet by mouth 2 times daily, Disp-90 tablet, R-1Normal  -     blood glucose monitor strips;  Test 3 times a day & as needed for symptoms of irregular blood glucose. Dispense sufficient amount for indicated testing frequency plus additional to accommodate PRN testing needs. , Disp-300 strip, R-2, Normal  -     Insulin Syringe-Needle U-100 (B-D INS SYR ULTRAFINE .5CC/30G) 30G X 1/2\" 0.5 ML MISC; Disp-100 each, R-3, Normal1/2cc/ml syringe with 32g x 8mm needle. May use any brand E11.9  4. Diabetic polyneuropathy associated with type 2 diabetes mellitus ([facility]  -     metFORMIN (GLUCOPHAGE-XR) 500 MG extended release tablet; Take 1 tablet by mouth once daily with breakfast, Disp-30 tablet, R-0Normal  -     insulin 70-30 (NOVOLIN 70/30) (70-30) 100 UNIT per ML injection vial; Inject 45 Units into the skin 2 times daily, Disp-2 each, R-3Normal  5. Diabetes mellitus due to underlying condition with diabetic neuropathy, with long-term current use of insulin (HCC)  -     metFORMIN (GLUCOPHAGE-XR) 500 MG extended release tablet; Take 1 tablet by mouth once daily with breakfast, Disp-30 tablet, R-0Normal  -     glipiZIDE (GLUCOTROL) 10 MG tablet; Take 1 tablet by mouth 2 times daily (before meals), Disp-90 tablet, R-2Normal  -     blood glucose monitor strips; Test 3 times a day & as needed for symptoms of irregular blood glucose. Dispense sufficient amount for indicated testing frequency plus additional to accommodate PRN testing needs. , Disp-300 strip, R-2, Normal  -     Insulin Syringe-Needle U-100 (B-D INS SYR ULTRAFINE .5CC/30G) 30G X 1/2\" 0.5 ML MISC; Disp-100 each, R-3, Normal1/2cc/ml syringe with 32g x 8mm needle. May use any brand E11.9  6. Diabetes mellitus due to underlying condition with diabetic neuropathy, with long-term current use of insulin (HCC)   -     metFORMIN (GLUCOPHAGE-XR) 500 MG extended release tablet; Take 1 tablet by mouth once daily with breakfast, Disp-30 tablet, R-0Normal  -     glipiZIDE (GLUCOTROL) 10 MG tablet;  Take 1 tablet by mouth 2 times daily (before meals), Disp-90 tablet, R-2Normal  -     blood glucose monitor strips; Test 3 times a day & as needed for symptoms of irregular blood glucose. Dispense sufficient amount for indicated testing frequency plus additional to accommodate PRN testing needs. , Disp-300 strip, R-2, Normal  -     Insulin Syringe-Needle U-100 (B-D INS SYR ULTRAFINE .5CC/30G) 30G X 1/2\" 0.5 ML MISC; Disp-100 each, R-3, Normal1/2cc/ml syringe with 32g x 8mm needle. May use any brand E11.9  7. Vertigo  -     meclizine (ANTIVERT) 25 MG CHEW; Take 1 tablet by mouth 3 times daily as needed (dizziness), Disp-30 tablet, R-0Normal             Patient given educational materials - see patient instructions. Discussed use, benefit, and side effects of prescribed medications. All patient questions answered. Pt voiced understanding. Reviewed health maintenance. Instructed to continue current medications, diet and exercise. Patient agreed with treatment plan. Follow up as directed.      Electronically signed by MAYRA Alexis on 1/24/2022 at 11:28 AM

## 2022-01-25 ENCOUNTER — CARE COORDINATION (OUTPATIENT)
Dept: CARE COORDINATION | Age: 77
End: 2022-01-25

## 2022-01-25 NOTE — CARE COORDINATION
Outreach call to follow up with patient for follow up ED visit/COVID monitoring, no answer, left detailed vm to return call to this nurse at [phone]. No further outreach scheduled with this CTN/ACM. Episode of Care resolved. Patient has this CTN/ACM contact information if future needs arise.

## 2022-02-12 DIAGNOSIS — E11.42 DIABETIC POLYNEUROPATHY ASSOCIATED WITH TYPE 2 DIABETES MELLITUS (HCC): ICD-10-CM

## 2022-02-12 DIAGNOSIS — Z79.4 DIABETES MELLITUS DUE TO UNDERLYING CONDITION WITH DIABETIC NEUROPATHY, WITH LONG-TERM CURRENT USE OF INSULIN (HCC): ICD-10-CM

## 2022-02-12 DIAGNOSIS — Z79.4 TYPE 2 DIABETES MELLITUS WITH HYPERGLYCEMIA, WITH LONG-TERM CURRENT USE OF INSULIN (HCC): ICD-10-CM

## 2022-02-12 DIAGNOSIS — E08.40 DIABETES MELLITUS DUE TO UNDERLYING CONDITION WITH DIABETIC NEUROPATHY, WITH LONG-TERM CURRENT USE OF INSULIN (HCC): ICD-10-CM

## 2022-02-12 DIAGNOSIS — E11.65 TYPE 2 DIABETES MELLITUS WITH HYPERGLYCEMIA, WITH LONG-TERM CURRENT USE OF INSULIN (HCC): ICD-10-CM

## 2022-02-14 RX ORDER — METFORMIN HYDROCHLORIDE 500 MG/1
TABLET, EXTENDED RELEASE ORAL
Qty: 30 TABLET | Refills: 0 | Status: SHIPPED | OUTPATIENT
Start: 2022-02-14 | End: 2022-03-07

## 2022-03-07 DIAGNOSIS — Z79.4 TYPE 2 DIABETES MELLITUS WITH HYPERGLYCEMIA, WITH LONG-TERM CURRENT USE OF INSULIN (HCC): ICD-10-CM

## 2022-03-07 DIAGNOSIS — E08.40 DIABETES MELLITUS DUE TO UNDERLYING CONDITION WITH DIABETIC NEUROPATHY, WITH LONG-TERM CURRENT USE OF INSULIN (HCC): ICD-10-CM

## 2022-03-07 DIAGNOSIS — E11.65 TYPE 2 DIABETES MELLITUS WITH HYPERGLYCEMIA, WITH LONG-TERM CURRENT USE OF INSULIN (HCC): ICD-10-CM

## 2022-03-07 DIAGNOSIS — Z79.4 DIABETES MELLITUS DUE TO UNDERLYING CONDITION WITH DIABETIC NEUROPATHY, WITH LONG-TERM CURRENT USE OF INSULIN (HCC): ICD-10-CM

## 2022-03-07 DIAGNOSIS — E11.42 DIABETIC POLYNEUROPATHY ASSOCIATED WITH TYPE 2 DIABETES MELLITUS (HCC): ICD-10-CM

## 2022-03-07 RX ORDER — METFORMIN HYDROCHLORIDE 500 MG/1
TABLET, EXTENDED RELEASE ORAL
Qty: 30 TABLET | Refills: 0 | Status: SHIPPED | OUTPATIENT
Start: 2022-03-07 | End: 2022-03-16

## 2022-03-16 DIAGNOSIS — E11.65 TYPE 2 DIABETES MELLITUS WITH HYPERGLYCEMIA, WITH LONG-TERM CURRENT USE OF INSULIN (HCC): ICD-10-CM

## 2022-03-16 DIAGNOSIS — Z79.4 DIABETES MELLITUS DUE TO UNDERLYING CONDITION WITH DIABETIC NEUROPATHY, WITH LONG-TERM CURRENT USE OF INSULIN (HCC): ICD-10-CM

## 2022-03-16 DIAGNOSIS — E08.40 DIABETES MELLITUS DUE TO UNDERLYING CONDITION WITH DIABETIC NEUROPATHY, WITH LONG-TERM CURRENT USE OF INSULIN (HCC): ICD-10-CM

## 2022-03-16 DIAGNOSIS — E11.42 DIABETIC POLYNEUROPATHY ASSOCIATED WITH TYPE 2 DIABETES MELLITUS (HCC): ICD-10-CM

## 2022-03-16 DIAGNOSIS — Z79.4 TYPE 2 DIABETES MELLITUS WITH HYPERGLYCEMIA, WITH LONG-TERM CURRENT USE OF INSULIN (HCC): ICD-10-CM

## 2022-03-16 PROCEDURE — 3051F HG A1C>EQUAL 7.0%<8.0%: CPT | Performed by: PHYSICIAN ASSISTANT

## 2022-03-16 RX ORDER — METFORMIN HYDROCHLORIDE 500 MG/1
TABLET, EXTENDED RELEASE ORAL
Qty: 30 TABLET | Refills: 0 | Status: SHIPPED | OUTPATIENT
Start: 2022-03-16 | End: 2022-05-03

## 2022-04-05 DIAGNOSIS — K21.9 GASTROESOPHAGEAL REFLUX DISEASE WITHOUT ESOPHAGITIS: ICD-10-CM

## 2022-04-05 RX ORDER — PANTOPRAZOLE SODIUM 40 MG/1
40 TABLET, DELAYED RELEASE ORAL DAILY
Qty: 90 TABLET | Refills: 2 | Status: ON HOLD | OUTPATIENT
Start: 2022-04-05 | End: 2022-08-04 | Stop reason: SDUPTHER

## 2022-04-25 ENCOUNTER — OFFICE VISIT (OUTPATIENT)
Dept: PRIMARY CARE CLINIC | Age: 77
End: 2022-04-25
Payer: COMMERCIAL

## 2022-04-25 VITALS
BODY MASS INDEX: 31.02 KG/M2 | DIASTOLIC BLOOD PRESSURE: 72 MMHG | SYSTOLIC BLOOD PRESSURE: 120 MMHG | WEIGHT: 193 LBS | HEART RATE: 71 BPM | OXYGEN SATURATION: 97 % | HEIGHT: 66 IN

## 2022-04-25 DIAGNOSIS — E11.42 DIABETIC POLYNEUROPATHY ASSOCIATED WITH TYPE 2 DIABETES MELLITUS (HCC): ICD-10-CM

## 2022-04-25 DIAGNOSIS — Z79.4 TYPE 2 DIABETES MELLITUS WITH HYPERGLYCEMIA, WITH LONG-TERM CURRENT USE OF INSULIN (HCC): Primary | ICD-10-CM

## 2022-04-25 DIAGNOSIS — R09.81 CONGESTION OF NASAL SINUS: ICD-10-CM

## 2022-04-25 DIAGNOSIS — E11.65 TYPE 2 DIABETES MELLITUS WITH HYPERGLYCEMIA, WITH LONG-TERM CURRENT USE OF INSULIN (HCC): Primary | ICD-10-CM

## 2022-04-25 DIAGNOSIS — R21 RASH: ICD-10-CM

## 2022-04-25 LAB — HBA1C MFR BLD: 7.8 %

## 2022-04-25 PROCEDURE — 83036 HEMOGLOBIN GLYCOSYLATED A1C: CPT | Performed by: PHYSICIAN ASSISTANT

## 2022-04-25 PROCEDURE — 99214 OFFICE O/P EST MOD 30 MIN: CPT | Performed by: PHYSICIAN ASSISTANT

## 2022-04-25 PROCEDURE — 3051F HG A1C>EQUAL 7.0%<8.0%: CPT | Performed by: PHYSICIAN ASSISTANT

## 2022-04-25 RX ORDER — LORATADINE 10 MG/1
10 TABLET ORAL DAILY
Qty: 30 TABLET | Refills: 0 | Status: SHIPPED | OUTPATIENT
Start: 2022-04-25 | End: 2022-05-31 | Stop reason: SDUPTHER

## 2022-04-25 RX ORDER — BLOOD-GLUCOSE METER
EACH MISCELLANEOUS
COMMUNITY
Start: 2022-01-29

## 2022-04-25 RX ORDER — CLOTRIMAZOLE AND BETAMETHASONE DIPROPIONATE 10; .64 MG/G; MG/G
CREAM TOPICAL
Qty: 45 G | Refills: 0 | Status: SHIPPED | OUTPATIENT
Start: 2022-04-25 | End: 2022-07-29

## 2022-04-25 ASSESSMENT — PATIENT HEALTH QUESTIONNAIRE - PHQ9
1. LITTLE INTEREST OR PLEASURE IN DOING THINGS: 0
9. THOUGHTS THAT YOU WOULD BE BETTER OFF DEAD, OR OF HURTING YOURSELF: 0
8. MOVING OR SPEAKING SO SLOWLY THAT OTHER PEOPLE COULD HAVE NOTICED. OR THE OPPOSITE, BEING SO FIGETY OR RESTLESS THAT YOU HAVE BEEN MOVING AROUND A LOT MORE THAN USUAL: 0
SUM OF ALL RESPONSES TO PHQ QUESTIONS 1-9: 0
SUM OF ALL RESPONSES TO PHQ QUESTIONS 1-9: 0
2. FEELING DOWN, DEPRESSED OR HOPELESS: 0
6. FEELING BAD ABOUT YOURSELF - OR THAT YOU ARE A FAILURE OR HAVE LET YOURSELF OR YOUR FAMILY DOWN: 0
7. TROUBLE CONCENTRATING ON THINGS, SUCH AS READING THE NEWSPAPER OR WATCHING TELEVISION: 0
4. FEELING TIRED OR HAVING LITTLE ENERGY: 0
SUM OF ALL RESPONSES TO PHQ QUESTIONS 1-9: 0
SUM OF ALL RESPONSES TO PHQ9 QUESTIONS 1 & 2: 0
3. TROUBLE FALLING OR STAYING ASLEEP: 0
5. POOR APPETITE OR OVEREATING: 0
10. IF YOU CHECKED OFF ANY PROBLEMS, HOW DIFFICULT HAVE THESE PROBLEMS MADE IT FOR YOU TO DO YOUR WORK, TAKE CARE OF THINGS AT HOME, OR GET ALONG WITH OTHER PEOPLE: 0
SUM OF ALL RESPONSES TO PHQ QUESTIONS 1-9: 0

## 2022-04-25 ASSESSMENT — ENCOUNTER SYMPTOMS
CHEST TIGHTNESS: 0
RHINORRHEA: 0
CONSTIPATION: 0
DIARRHEA: 0
COUGH: 0
EYE DISCHARGE: 0
SHORTNESS OF BREATH: 0
SINUS PRESSURE: 0
ABDOMINAL PAIN: 0
PHOTOPHOBIA: 0
SORE THROAT: 0
ABDOMINAL DISTENTION: 0
VOMITING: 0

## 2022-04-25 ASSESSMENT — COLUMBIA-SUICIDE SEVERITY RATING SCALE - C-SSRS
1. WITHIN THE PAST MONTH, HAVE YOU WISHED YOU WERE DEAD OR WISHED YOU COULD GO TO SLEEP AND NOT WAKE UP?: NO
2. HAVE YOU ACTUALLY HAD ANY THOUGHTS OF KILLING YOURSELF?: NO
6. HAVE YOU EVER DONE ANYTHING, STARTED TO DO ANYTHING, OR PREPARED TO DO ANYTHING TO END YOUR LIFE?: NO

## 2022-04-25 NOTE — PROGRESS NOTES
[facility] PRIMARY CARE  [address] [address]  [address] [address]  Dept: [address] [place] [address] Eunice Hood is a 69 y.o. female Established patient, who presents today for her medical conditions/complaints as noted below. Chief Complaint   Patient presents with    Diabetes       HPI:     HPI: The patient is having a light yellow mucus a lot lately. No fevers. It has been two weeks today. The patients sugars have been well controled. Taking metformin in the morings. Would not like increased at this time.       Reviewed prior notes None  Reviewed previous Labs    LDL Cholesterol (mg/dL)   Date Value   03/26/2021 173 (H)       (goal LDL is <100)   AST (U/L)   Date Value   01/08/2022 22     ALT (U/L)   Date Value   01/08/2022 14     BUN (mg/dL)   Date Value   01/08/2022 17     Hemoglobin A1C (%)   Date Value   04/25/2022 7.8     TSH (mIU/L)   Date Value   03/26/2021 2.76     BP Readings from Last 3 Encounters:   04/25/22 120/72   01/24/22 116/62   01/11/22 (!) 111/59          (goal 120/80)    Past Medical History:   Diagnosis Date    COVID 1/8/2022    COVID 1/8/2022    Diabetes mellitus ([address]     Seasonal allergies       Past Surgical History:   Procedure Laterality Date    APPENDECTOMY      GALLBLADDER SURGERY      HAND SURGERY N/A     Both hands and right twice     KNEE ARTHROPLASTY      PARTIAL HYSTERECTOMY Right     Age 33. 1 ovary and F. tube    SINUS SURGERY N/A     Two       Family History   Problem Relation Age of Onset    Kidney Disease Mother     Other Mother     Cancer Mother     Heart Disease Father     Stroke Father     Cancer Sister     Cancer Brother        Social History     Tobacco Use    Smoking status: Never Smoker    Smokeless tobacco: Never Used   Substance Use Topics    Alcohol use: Never      Current Outpatient Medications   Medication Sig Dispense Refill    Blood Glucose Monitoring Suppl ([address]) w/Device KIT USE AS DIRECTED TO TEST THREE TIMES DAILY      loratadine (CLARITIN) 10 MG tablet Take 1 tablet by mouth daily 30 tablet 0    clotrimazole-betamethasone (LOTRISONE) 1-0.05 % cream Apply topically 2 times daily. 45 g 0    pantoprazole (PROTONIX) 40 MG tablet Take 1 tablet by mouth daily 90 tablet 2    metFORMIN (GLUCOPHAGE-XR) 500 MG extended release tablet TAKE 1 TABLET BY MOUTH EVERY DAY with breakfast 30 tablet 0    insulin 70-30 (NOVOLIN 70/30) (70-30) 100 UNIT per ML injection vial Inject 45 Units into the skin 2 times daily 2 each 3    glipiZIDE (GLUCOTROL) 10 MG tablet Take 1 tablet by mouth 2 times daily (before meals) 90 tablet 2    gemfibrozil (LOPID) 600 MG tablet Take 1 tablet by mouth 2 times daily 90 tablet 1    blood glucose monitor strips Test 3 times a day & as needed for symptoms of irregular blood glucose. Dispense sufficient amount for indicated testing frequency plus additional to accommodate PRN testing needs. 300 strip 2    Insulin Syringe-Needle U-100 (B-D INS SYR ULTRAFINE .5CC/30G) 30G X 1/2\" 0.5 ML MISC 1/2cc/ml syringe with 32g x 8mm needle. May use any brand  E11.9 100 each 3    meclizine (ANTIVERT) 25 MG CHEW Take 1 tablet by mouth 3 times daily as needed (dizziness) 30 tablet 0     No current facility-administered medications for this visit.      Allergies   Allergen Reactions    Aspirin Other (See Comments)    Influenza Vaccines Other (See Comments)    Penicillins     Pneumococcal Vaccine     Seasonal        Health Maintenance   Topic Date Due    Hepatitis C screen  Never done    DTaP/Tdap/Td vaccine (1 - Tdap) Never done    DEXA (modify frequency per FRAX score)  Never done    Shingles Vaccine (2 of 3) 12/31/2009    COVID-19 Vaccine (3 - Booster for Pfizer series) 09/26/2021    Depression Screen  06/30/2022    Annual Wellness Visit (AWV)  07/01/2022    Hepatitis A vaccine  Aged Out    Hib vaccine  Aged Out    Meningococcal (ACWY) vaccine  Aged Out Subjective:      Review of Systems   Constitutional: Negative for chills, fever and unexpected weight change. HENT: Negative for congestion, hearing loss, rhinorrhea, sinus pressure and sore throat. Eyes: Negative for photophobia, discharge and visual disturbance. Respiratory: Negative for cough, chest tightness and shortness of breath. Cardiovascular: Negative for chest pain, palpitations and leg swelling. Gastrointestinal: Negative for abdominal distention, abdominal pain, constipation, diarrhea and vomiting. Endocrine: Negative for polydipsia and polyuria. Genitourinary: Negative for decreased urine volume, difficulty urinating, frequency and urgency. Musculoskeletal: Negative for arthralgias, gait problem and myalgias. Skin: Negative for rash. Allergic/Immunologic: Negative for food allergies. Neurological: Negative for dizziness, weakness, numbness and headaches. Hematological: Negative for adenopathy. Psychiatric/Behavioral: Negative for dysphoric mood and sleep disturbance. The patient is not nervous/anxious. Objective:     /72   Pulse 71   Ht 5' 6\" (1.676 m)   Wt 193 lb (87.5 kg)   SpO2 97%   BMI 31.15 kg/m²   Physical Exam  Constitutional:       General: She is not in acute distress. Appearance: Normal appearance. She is not ill-appearing. HENT:      Head: Normocephalic and atraumatic. Right Ear: Tympanic membrane, ear canal and external ear normal.      Left Ear: Tympanic membrane, ear canal and external ear normal.      Nose: Nose normal.      Mouth/Throat:      Mouth: Mucous membranes are moist.   Eyes:      Extraocular Movements: Extraocular movements intact. Conjunctiva/sclera: Conjunctivae normal.      Pupils: Pupils are equal, round, and reactive to light. Neck:      Vascular: No carotid bruit. Cardiovascular:      Rate and Rhythm: Normal rate and regular rhythm. Pulses: Normal pulses. Heart sounds: Normal heart sounds. Pulmonary:      Effort: Pulmonary effort is normal. No respiratory distress. Breath sounds: Normal breath sounds. Abdominal:      General: Bowel sounds are normal. There is no distension. Tenderness: There is no abdominal tenderness. Musculoskeletal:         General: Normal range of motion. Cervical back: Normal range of motion and neck supple. Lymphadenopathy:      Cervical: No cervical adenopathy. Skin:     General: Skin is warm and dry. Neurological:      General: No focal deficit present. Mental Status: She is alert and oriented to person, place, and time. Psychiatric:         Mood and Affect: Mood normal.         Behavior: Behavior normal.         Thought Content: Thought content normal.         Assessment and Plan:          1. Type 2 diabetes mellitus with hyperglycemia, with long-term current use of insulin ([facility])  -     POCT glycosylated hemoglobin (Hb A1C)  -     Lipid, Fasting; Future  -     Comprehensive Metabolic Panel; Future  -     TSH With Reflex Ft4; Future  -     CBC with Auto Differential; Future  2. Congestion of nasal sinus  -     loratadine (CLARITIN) 10 MG tablet; Take 1 tablet by mouth daily, Disp-30 tablet, R-0Normal  3. Diabetic polyneuropathy associated with type 2 diabetes mellitus ([facility]  4. Rash  -     clotrimazole-betamethasone (LOTRISONE) 1-0.05 % cream; Apply topically 2 times daily. , Disp-45 g, R-0, Normal   Stopped gabapentin. Continue insulin as prescribed. Patient given educational materials - see patient instructions. Discussed use, benefit, and side effects of prescribed medications. All patient questions answered. Pt voiced understanding. Reviewed health maintenance. Instructed to continue current medications, diet and exercise. Patient agreed with treatment plan. Follow up as directed.      Electronically signed by MAYRA Perez on 4/25/2022 at 11:27 AM

## 2022-05-03 DIAGNOSIS — Z79.4 DIABETES MELLITUS DUE TO UNDERLYING CONDITION WITH DIABETIC NEUROPATHY, WITH LONG-TERM CURRENT USE OF INSULIN (HCC): ICD-10-CM

## 2022-05-03 DIAGNOSIS — E08.40 DIABETES MELLITUS DUE TO UNDERLYING CONDITION WITH DIABETIC NEUROPATHY, WITH LONG-TERM CURRENT USE OF INSULIN (HCC): ICD-10-CM

## 2022-05-03 DIAGNOSIS — E11.65 TYPE 2 DIABETES MELLITUS WITH HYPERGLYCEMIA, WITH LONG-TERM CURRENT USE OF INSULIN (HCC): ICD-10-CM

## 2022-05-03 DIAGNOSIS — Z79.4 TYPE 2 DIABETES MELLITUS WITH HYPERGLYCEMIA, WITH LONG-TERM CURRENT USE OF INSULIN (HCC): ICD-10-CM

## 2022-05-03 DIAGNOSIS — E11.42 DIABETIC POLYNEUROPATHY ASSOCIATED WITH TYPE 2 DIABETES MELLITUS (HCC): ICD-10-CM

## 2022-05-03 RX ORDER — METFORMIN HYDROCHLORIDE 500 MG/1
TABLET, EXTENDED RELEASE ORAL
Qty: 30 TABLET | Refills: 0 | Status: SHIPPED | OUTPATIENT
Start: 2022-05-03 | End: 2022-05-16

## 2022-05-16 DIAGNOSIS — Z79.4 DIABETES MELLITUS DUE TO UNDERLYING CONDITION WITH DIABETIC NEUROPATHY, WITH LONG-TERM CURRENT USE OF INSULIN (HCC): ICD-10-CM

## 2022-05-16 DIAGNOSIS — Z79.4 TYPE 2 DIABETES MELLITUS WITH HYPERGLYCEMIA, WITH LONG-TERM CURRENT USE OF INSULIN (HCC): ICD-10-CM

## 2022-05-16 DIAGNOSIS — E08.40 DIABETES MELLITUS DUE TO UNDERLYING CONDITION WITH DIABETIC NEUROPATHY, WITH LONG-TERM CURRENT USE OF INSULIN (HCC): ICD-10-CM

## 2022-05-16 DIAGNOSIS — E11.42 DIABETIC POLYNEUROPATHY ASSOCIATED WITH TYPE 2 DIABETES MELLITUS (HCC): ICD-10-CM

## 2022-05-16 DIAGNOSIS — E11.65 TYPE 2 DIABETES MELLITUS WITH HYPERGLYCEMIA, WITH LONG-TERM CURRENT USE OF INSULIN (HCC): ICD-10-CM

## 2022-05-16 RX ORDER — METFORMIN HYDROCHLORIDE 500 MG/1
TABLET, EXTENDED RELEASE ORAL
Qty: 30 TABLET | Refills: 1 | Status: SHIPPED | OUTPATIENT
Start: 2022-05-16 | End: 2022-08-04

## 2022-05-31 DIAGNOSIS — E11.65 TYPE 2 DIABETES MELLITUS WITH HYPERGLYCEMIA, WITH LONG-TERM CURRENT USE OF INSULIN (HCC): ICD-10-CM

## 2022-05-31 DIAGNOSIS — Z79.4 TYPE 2 DIABETES MELLITUS WITH HYPERGLYCEMIA, WITH LONG-TERM CURRENT USE OF INSULIN (HCC): ICD-10-CM

## 2022-05-31 DIAGNOSIS — R09.81 CONGESTION OF NASAL SINUS: ICD-10-CM

## 2022-05-31 RX ORDER — LORATADINE 10 MG/1
10 TABLET ORAL DAILY
Qty: 90 TABLET | Refills: 2 | Status: SHIPPED | OUTPATIENT
Start: 2022-05-31 | End: 2022-06-30

## 2022-05-31 RX ORDER — GEMFIBROZIL 600 MG/1
TABLET, FILM COATED ORAL
Qty: 90 TABLET | Refills: 0 | Status: SHIPPED | OUTPATIENT
Start: 2022-05-31 | End: 2022-07-13

## 2022-06-22 ENCOUNTER — HOSPITAL ENCOUNTER (OUTPATIENT)
Age: 77
Discharge: HOME | End: 2022-06-24
Payer: COMMERCIAL

## 2022-06-22 ENCOUNTER — OFFICE VISIT (OUTPATIENT)
Dept: PRIMARY CARE CLINIC | Age: 77
End: 2022-06-22
Payer: COMMERCIAL

## 2022-06-22 ENCOUNTER — HOSPITAL ENCOUNTER (OUTPATIENT)
Dept: GENERAL RADIOLOGY | Age: 77
Discharge: HOME | End: 2022-06-24
Payer: COMMERCIAL

## 2022-06-22 ENCOUNTER — HOSPITAL ENCOUNTER (OUTPATIENT)
Age: 77
Discharge: HOME | End: 2022-06-22
Payer: COMMERCIAL

## 2022-06-22 VITALS
OXYGEN SATURATION: 97 % | HEART RATE: 75 BPM | SYSTOLIC BLOOD PRESSURE: 124 MMHG | WEIGHT: 191.6 LBS | DIASTOLIC BLOOD PRESSURE: 62 MMHG | BODY MASS INDEX: 30.93 KG/M2

## 2022-06-22 DIAGNOSIS — R05.9 COUGH: ICD-10-CM

## 2022-06-22 DIAGNOSIS — R06.02 SOB (SHORTNESS OF BREATH): ICD-10-CM

## 2022-06-22 DIAGNOSIS — R09.81 CONGESTION OF NASAL SINUS: ICD-10-CM

## 2022-06-22 DIAGNOSIS — R05.9 COUGH: Primary | ICD-10-CM

## 2022-06-22 LAB
ABSOLUTE EOS #: 0.3 K/UL (ref 0–0.4)
ABSOLUTE LYMPH #: 2.5 K/UL (ref 1–4.8)
ABSOLUTE MONO #: 0.4 K/UL (ref 0.1–1.2)
BASOPHILS # BLD: 1 % (ref 0–2)
BASOPHILS ABSOLUTE: 0.1 K/UL (ref 0–0.2)
D-DIMER QUANTITATIVE: 0.88 MG/L FEU
EOSINOPHILS RELATIVE PERCENT: 5 % (ref 1–4)
HCT VFR BLD CALC: 41 % (ref 36–46)
HEMOGLOBIN: 14 G/DL (ref 12–16)
LYMPHOCYTES # BLD: 48 % (ref 24–44)
MCH RBC QN AUTO: 32.1 PG (ref 26–34)
MCHC RBC AUTO-ENTMCNC: 34.1 G/DL (ref 31–37)
MCV RBC AUTO: 94.3 FL (ref 80–100)
MONOCYTES # BLD: 7 % (ref 2–11)
PDW BLD-RTO: 13.6 % (ref 12.5–15.4)
PLATELET # BLD: 313 K/UL (ref 140–450)
PMV BLD AUTO: 6.8 FL (ref 6–12)
RBC # BLD: 4.35 M/UL (ref 4–5.2)
SEG NEUTROPHILS: 39 % (ref 36–66)
SEGMENTED NEUTROPHILS ABSOLUTE COUNT: 2.1 K/UL (ref 1.8–7.7)
TROPONIN, HIGH SENSITIVITY: 17 NG/L (ref 0–14)
WBC # BLD: 5.3 K/UL (ref 3.5–11)

## 2022-06-22 PROCEDURE — 36415 COLL VENOUS BLD VENIPUNCTURE: CPT

## 2022-06-22 PROCEDURE — 99214 OFFICE O/P EST MOD 30 MIN: CPT | Performed by: PHYSICIAN ASSISTANT

## 2022-06-22 PROCEDURE — 84484 ASSAY OF TROPONIN QUANT: CPT

## 2022-06-22 PROCEDURE — 85025 COMPLETE CBC W/AUTO DIFF WBC: CPT

## 2022-06-22 PROCEDURE — 71046 X-RAY EXAM CHEST 2 VIEWS: CPT

## 2022-06-22 PROCEDURE — 1123F ACP DISCUSS/DSCN MKR DOCD: CPT | Performed by: PHYSICIAN ASSISTANT

## 2022-06-22 PROCEDURE — 85379 FIBRIN DEGRADATION QUANT: CPT

## 2022-06-22 PROCEDURE — 83880 ASSAY OF NATRIURETIC PEPTIDE: CPT

## 2022-06-22 RX ORDER — PREDNISONE 10 MG/1
10 TABLET ORAL 2 TIMES DAILY
Qty: 10 TABLET | Refills: 0 | Status: SHIPPED | OUTPATIENT
Start: 2022-06-22 | End: 2022-06-27

## 2022-06-22 RX ORDER — MONTELUKAST SODIUM 10 MG/1
10 TABLET ORAL DAILY
Qty: 30 TABLET | Refills: 3 | Status: SHIPPED | OUTPATIENT
Start: 2022-06-22 | End: 2022-07-29 | Stop reason: ALTCHOICE

## 2022-06-22 RX ORDER — AZITHROMYCIN 250 MG/1
TABLET, FILM COATED ORAL
Qty: 1 PACKET | Refills: 0 | Status: SHIPPED | OUTPATIENT
Start: 2022-06-22 | End: 2022-07-02

## 2022-06-22 SDOH — ECONOMIC STABILITY: FOOD INSECURITY: WITHIN THE PAST 12 MONTHS, YOU WORRIED THAT YOUR FOOD WOULD RUN OUT BEFORE YOU GOT MONEY TO BUY MORE.: NEVER TRUE

## 2022-06-22 SDOH — ECONOMIC STABILITY: FOOD INSECURITY: WITHIN THE PAST 12 MONTHS, THE FOOD YOU BOUGHT JUST DIDN'T LAST AND YOU DIDN'T HAVE MONEY TO GET MORE.: NEVER TRUE

## 2022-06-22 ASSESSMENT — ENCOUNTER SYMPTOMS
DIARRHEA: 0
SINUS PRESSURE: 0
EYE DISCHARGE: 0
COUGH: 1
RHINORRHEA: 0
SHORTNESS OF BREATH: 1
ABDOMINAL PAIN: 0
VOMITING: 0
PHOTOPHOBIA: 0
SORE THROAT: 0
CONSTIPATION: 0
ABDOMINAL DISTENTION: 0
CHEST TIGHTNESS: 0

## 2022-06-22 ASSESSMENT — SOCIAL DETERMINANTS OF HEALTH (SDOH): HOW HARD IS IT FOR YOU TO PAY FOR THE VERY BASICS LIKE FOOD, HOUSING, MEDICAL CARE, AND HEATING?: NOT HARD AT ALL

## 2022-06-22 NOTE — PROGRESS NOTES
[facility]  [address] [address]  Dept: [address] [place] [address] Jaylon Retana is a 69 y.o. female Established patient, who presents today for her medical conditions/complaints as noted below. Chief Complaint   Patient presents with    Cough     Slight cough. Phlegm is yellow. Patient has been taking claritin but not getting any relief    Sinus Problem     Sinus pressure. x 3 weeks     Shortness of Breath     SOB with excertion        HPI:     HPI: The patient has had this issue for about 3 weeks. Has tried Claritin without relief. SOB coughing up some phlem. Some itchy throat. Sneezing a lot. Taking claritin. SOB but no chest pain. No palpitations. No fevers. No leg swelling. SOB last two weeks.      Reviewed prior notes None  Reviewed previous Labs    LDL Cholesterol (mg/dL)   Date Value   03/26/2021 173 (H)       (goal LDL is <100)   AST (U/L)   Date Value   01/08/2022 22     ALT (U/L)   Date Value   01/08/2022 14     BUN (mg/dL)   Date Value   01/08/2022 17     Hemoglobin A1C (%)   Date Value   04/25/2022 7.8     TSH (mIU/L)   Date Value   03/26/2021 2.76     BP Readings from Last 3 Encounters:   06/22/22 124/62   04/25/22 120/72   01/24/22 116/62          (goal 120/80)    Past Medical History:   Diagnosis Date    COVID 1/8/2022    COVID 1/8/2022    Diabetes mellitus ([address]     Seasonal allergies       Past Surgical History:   Procedure Laterality Date    APPENDECTOMY      GALLBLADDER SURGERY      HAND SURGERY N/A     Both hands and right twice     KNEE ARTHROPLASTY      PARTIAL HYSTERECTOMY (CERVIX NOT REMOVED) Right     Age 33. 1 ovary and F. tube    SINUS SURGERY N/A     Two       Family History   Problem Relation Age of Onset    Kidney Disease Mother     Other Mother     Cancer Mother     Heart Disease Father     Stroke Father     Cancer Sister     Cancer Brother        Social History     Tobacco Use    Smoking status: Never Smoker    Smokeless tobacco: Never Used   Substance Use Topics    Alcohol use: Never      Current Outpatient Medications   Medication Sig Dispense Refill    montelukast (SINGULAIR) 10 MG tablet Take 1 tablet by mouth daily 30 tablet 3    azithromycin (ZITHROMAX) 250 MG tablet 2 tablets now then 1 daily until gone. 1 packet 0    predniSONE (DELTASONE) 10 MG tablet Take 1 tablet by mouth 2 times daily for 5 days 10 tablet 0    gemfibrozil (LOPID) 600 MG tablet TAKE 1 TABLET BY MOUTH TWO TIMES A DAY 90 tablet 0    loratadine (CLARITIN) 10 MG tablet Take 1 tablet by mouth daily 90 tablet 2    metFORMIN (GLUCOPHAGE-XR) 500 MG extended release tablet TAKE 1 TABLET BY MOUTH EVERY DAY WITH BREAKFAST 30 tablet 1    Blood Glucose Monitoring Suppl (ONETOUCH VERIO FLEX SYSTEM) w/Device KIT USE AS DIRECTED TO TEST THREE TIMES DAILY      pantoprazole (PROTONIX) 40 MG tablet Take 1 tablet by mouth daily 90 tablet 2    insulin 70-30 (NOVOLIN 70/30) (70-30) 100 UNIT per ML injection vial Inject 45 Units into the skin 2 times daily 2 each 3    glipiZIDE (GLUCOTROL) 10 MG tablet Take 1 tablet by mouth 2 times daily (before meals) 90 tablet 2    blood glucose monitor strips Test 3 times a day & as needed for symptoms of irregular blood glucose. Dispense sufficient amount for indicated testing frequency plus additional to accommodate PRN testing needs. 300 strip 2    Insulin Syringe-Needle U-100 (B-D INS SYR ULTRAFINE .5CC/30G) 30G X 1/2\" 0.5 ML MISC 1/2cc/ml syringe with 32g x 8mm needle. May use any brand  E11.9 100 each 3    meclizine (ANTIVERT) 25 MG CHEW Take 1 tablet by mouth 3 times daily as needed (dizziness) 30 tablet 0    clotrimazole-betamethasone (LOTRISONE) 1-0.05 % cream Apply topically 2 times daily. (Patient not taking: Reported on 6/22/2022) 45 g 0     No current facility-administered medications for this visit.      Allergies   Allergen Reactions    Aspirin Other (See Comments)    Influenza Vaccines Other (See Comments)    Penicillins     Pneumococcal Vaccine     Seasonal        Health Maintenance   Topic Date Due    Hepatitis C screen  Never done    DTaP/Tdap/Td vaccine (1 - Tdap) Never done    DEXA (modify frequency per FRAX score)  Never done    Shingles vaccine (2 of 3) 12/31/2009    COVID-19 Vaccine (3 - Booster for Nicolas Epstein series) 09/26/2021    Annual Wellness Visit (AWV)  07/01/2022    Depression Screen  04/25/2023    Hepatitis A vaccine  Aged Out    Hib vaccine  Aged Out    Meningococcal (ACWY) vaccine  Aged Out       Subjective:      Review of Systems   Constitutional: Negative for chills, fever and unexpected weight change. HENT: Positive for congestion. Negative for hearing loss, rhinorrhea, sinus pressure and sore throat. Eyes: Negative for photophobia, discharge and visual disturbance. Respiratory: Positive for cough and shortness of breath. Negative for chest tightness. Cardiovascular: Negative for chest pain, palpitations and leg swelling. Gastrointestinal: Negative for abdominal distention, abdominal pain, constipation, diarrhea and vomiting. Endocrine: Negative for polydipsia and polyuria. Genitourinary: Negative for decreased urine volume, difficulty urinating, frequency and urgency. Musculoskeletal: Negative for arthralgias, gait problem and myalgias. Skin: Negative for rash. Allergic/Immunologic: Negative for food allergies. Neurological: Negative for dizziness, weakness, numbness and headaches. Hematological: Negative for adenopathy. Psychiatric/Behavioral: Negative for dysphoric mood and sleep disturbance. The patient is not nervous/anxious. Objective:     /62   Pulse 75   Wt 191 lb 9.6 oz (86.9 kg)   SpO2 97%   BMI 30.93 kg/m²   Physical Exam  Constitutional:       General: She is not in acute distress. Appearance: Normal appearance. She is not ill-appearing. HENT:      Head: Normocephalic and atraumatic. Right Ear: External ear normal.      Left Ear: External ear normal.      Nose: Nose normal.      Mouth/Throat:      Mouth: Mucous membranes are moist.   Eyes:      Extraocular Movements: Extraocular movements intact. Conjunctiva/sclera: Conjunctivae normal.      Pupils: Pupils are equal, round, and reactive to light. Neck:      Vascular: No carotid bruit. Cardiovascular:      Rate and Rhythm: Normal rate and regular rhythm. Pulses: Normal pulses. Heart sounds: Normal heart sounds. Pulmonary:      Effort: Pulmonary effort is normal. No respiratory distress. Breath sounds: Normal breath sounds. Abdominal:      General: Bowel sounds are normal. There is no distension. Tenderness: There is no abdominal tenderness. Musculoskeletal:         General: Normal range of motion. Cervical back: Normal range of motion and neck supple. Lymphadenopathy:      Cervical: No cervical adenopathy. Skin:     General: Skin is warm and dry. Neurological:      General: No focal deficit present. Mental Status: She is alert and oriented to person, place, and time. Psychiatric:         Mood and Affect: Mood normal.         Behavior: Behavior normal.         Thought Content: Thought content normal.         Assessment and Plan:          1. Cough  -     montelukast (SINGULAIR) 10 MG tablet; Take 1 tablet by mouth daily, Disp-30 tablet, R-3Normal  -     azithromycin (ZITHROMAX) 250 MG tablet; 2 tablets now then 1 daily until gone., Disp-1 packet, R-0Normal  -     XR CHEST (2 VW); Future  -     predniSONE (DELTASONE) 10 MG tablet; Take 1 tablet by mouth 2 times daily for 5 days, Disp-10 tablet, R-0Normal  2. Congestion of nasal sinus  -     montelukast (SINGULAIR) 10 MG tablet; Take 1 tablet by mouth daily, Disp-30 tablet, R-3Normal  -     azithromycin (ZITHROMAX) 250 MG tablet; 2 tablets now then 1 daily until gone., Disp-1 packet, R-0Normal  -     XR CHEST (2 VW);  Future  -     predniSONE (DELTASONE) 10 MG tablet; Take 1 tablet by mouth 2 times daily for 5 days, Disp-10 tablet, R-0Normal             Patient given educational materials - see patient instructions. Discussed use, benefit, and side effects of prescribed medications. All patient questions answered. Pt voiced understanding. Reviewed health maintenance. Instructed to continue current medications, diet and exercise. Patient agreed with treatment plan. Follow up as directed.      Electronically signed by Marjie Holter, PA on 6/22/2022 at 4:06 PM

## 2022-06-24 ENCOUNTER — HOSPITAL ENCOUNTER (OUTPATIENT)
Age: 77
Discharge: HOME | End: 2022-06-24
Payer: COMMERCIAL

## 2022-06-24 DIAGNOSIS — R06.02 SOB (SHORTNESS OF BREATH): ICD-10-CM

## 2022-06-24 DIAGNOSIS — R05.9 COUGH: ICD-10-CM

## 2022-06-24 LAB
PRO-BNP: 50 PG/ML
TROPONIN, HIGH SENSITIVITY: 12 NG/L (ref 0–14)

## 2022-06-24 PROCEDURE — 84484 ASSAY OF TROPONIN QUANT: CPT

## 2022-06-24 PROCEDURE — 36415 COLL VENOUS BLD VENIPUNCTURE: CPT

## 2022-06-24 NOTE — RESULT ENCOUNTER NOTE
Call and advise patient that the results showed slightly elevated troponin. Please have patient repeat troponin and if any chest pain go to ER.

## 2022-06-24 NOTE — RESULT ENCOUNTER NOTE
Call and advise patient that the results showed possible infection of lungs will continue with antibiotic and follow up if no improvement. Have patient take a very deep breath at least once per hour to help lungs expand.

## 2022-06-28 DIAGNOSIS — E11.65 TYPE 2 DIABETES MELLITUS WITH HYPERGLYCEMIA, WITH LONG-TERM CURRENT USE OF INSULIN (HCC): ICD-10-CM

## 2022-06-28 DIAGNOSIS — Z79.4 TYPE 2 DIABETES MELLITUS WITH HYPERGLYCEMIA, WITH LONG-TERM CURRENT USE OF INSULIN (HCC): ICD-10-CM

## 2022-06-28 DIAGNOSIS — E08.40 DIABETES MELLITUS DUE TO UNDERLYING CONDITION WITH DIABETIC NEUROPATHY, WITH LONG-TERM CURRENT USE OF INSULIN (HCC): ICD-10-CM

## 2022-06-28 DIAGNOSIS — Z79.4 DIABETES MELLITUS DUE TO UNDERLYING CONDITION WITH DIABETIC NEUROPATHY, WITH LONG-TERM CURRENT USE OF INSULIN (HCC): ICD-10-CM

## 2022-06-28 RX ORDER — GLIPIZIDE 10 MG/1
TABLET ORAL
Qty: 90 TABLET | Refills: 0 | Status: SHIPPED | OUTPATIENT
Start: 2022-06-28 | End: 2022-09-26

## 2022-07-13 DIAGNOSIS — E11.65 TYPE 2 DIABETES MELLITUS WITH HYPERGLYCEMIA, WITH LONG-TERM CURRENT USE OF INSULIN (HCC): ICD-10-CM

## 2022-07-13 DIAGNOSIS — Z79.4 TYPE 2 DIABETES MELLITUS WITH HYPERGLYCEMIA, WITH LONG-TERM CURRENT USE OF INSULIN (HCC): ICD-10-CM

## 2022-07-13 RX ORDER — GEMFIBROZIL 600 MG/1
TABLET, FILM COATED ORAL
Qty: 90 TABLET | Refills: 0 | Status: SHIPPED | OUTPATIENT
Start: 2022-07-13 | End: 2022-09-12 | Stop reason: SDUPTHER

## 2022-07-18 ENCOUNTER — HOSPITAL ENCOUNTER (OUTPATIENT)
Age: 77
Discharge: HOME | End: 2022-07-18
Payer: MEDICARE

## 2022-07-18 DIAGNOSIS — E11.65 TYPE 2 DIABETES MELLITUS WITH HYPERGLYCEMIA, WITH LONG-TERM CURRENT USE OF INSULIN (HCC): ICD-10-CM

## 2022-07-18 DIAGNOSIS — Z79.4 TYPE 2 DIABETES MELLITUS WITH HYPERGLYCEMIA, WITH LONG-TERM CURRENT USE OF INSULIN (HCC): ICD-10-CM

## 2022-07-18 LAB
ABSOLUTE EOS #: 0.14 K/UL (ref 0–0.44)
ABSOLUTE IMMATURE GRANULOCYTE: <0.03 K/UL (ref 0–0.3)
ABSOLUTE LYMPH #: 2.34 K/UL (ref 1.1–3.7)
ABSOLUTE MONO #: 0.39 K/UL (ref 0.1–1.2)
ALBUMIN SERPL-MCNC: 4.3 G/DL (ref 3.5–5.2)
ALBUMIN/GLOBULIN RATIO: 1.2 (ref 1–2.5)
ALP BLD-CCNC: 85 U/L (ref 35–104)
ALT SERPL-CCNC: 24 U/L (ref 5–33)
ANION GAP SERPL CALCULATED.3IONS-SCNC: 14 MMOL/L (ref 9–17)
AST SERPL-CCNC: 25 U/L
BASOPHILS # BLD: 1 % (ref 0–2)
BASOPHILS ABSOLUTE: 0.03 K/UL (ref 0–0.2)
BILIRUB SERPL-MCNC: 0.31 MG/DL (ref 0.3–1.2)
BUN BLDV-MCNC: 12 MG/DL (ref 8–23)
CALCIUM SERPL-MCNC: 10 MG/DL (ref 8.6–10.4)
CHLORIDE BLD-SCNC: 102 MMOL/L (ref 98–107)
CHOLESTEROL, FASTING: 243 MG/DL
CHOLESTEROL/HDL RATIO: 7.1
CO2: 26 MMOL/L (ref 20–31)
CREAT SERPL-MCNC: 0.89 MG/DL (ref 0.5–0.9)
EOSINOPHILS RELATIVE PERCENT: 3 % (ref 1–4)
GFR AFRICAN AMERICAN: >60 ML/MIN
GFR NON-AFRICAN AMERICAN: >60 ML/MIN
GFR SERPL CREATININE-BSD FRML MDRD: ABNORMAL ML/MIN/{1.73_M2}
GLUCOSE BLD-MCNC: 166 MG/DL (ref 70–99)
HCT VFR BLD CALC: 43.7 % (ref 36.3–47.1)
HDLC SERPL-MCNC: 34 MG/DL
HEMOGLOBIN: 14.5 G/DL (ref 11.9–15.1)
IMMATURE GRANULOCYTES: 0 %
LDL CHOLESTEROL: 186 MG/DL (ref 0–130)
LYMPHOCYTES # BLD: 46 % (ref 24–43)
MCH RBC QN AUTO: 32.2 PG (ref 25.2–33.5)
MCHC RBC AUTO-ENTMCNC: 33.2 G/DL (ref 28.4–34.8)
MCV RBC AUTO: 96.9 FL (ref 82.6–102.9)
MONOCYTES # BLD: 8 % (ref 3–12)
NRBC AUTOMATED: 0 PER 100 WBC
PDW BLD-RTO: 12.7 % (ref 11.8–14.4)
PLATELET # BLD: 306 K/UL (ref 138–453)
PMV BLD AUTO: 10 FL (ref 8.1–13.5)
POTASSIUM SERPL-SCNC: 4.6 MMOL/L (ref 3.7–5.3)
RBC # BLD: 4.51 M/UL (ref 3.95–5.11)
SEG NEUTROPHILS: 42 % (ref 36–65)
SEGMENTED NEUTROPHILS ABSOLUTE COUNT: 2.13 K/UL (ref 1.5–8.1)
SODIUM BLD-SCNC: 142 MMOL/L (ref 135–144)
TOTAL PROTEIN: 7.8 G/DL (ref 6.4–8.3)
TRIGLYCERIDE, FASTING: 115 MG/DL
TSH SERPL DL<=0.05 MIU/L-ACNC: 3.32 UIU/ML (ref 0.3–5)
WBC # BLD: 5 K/UL (ref 3.5–11.3)

## 2022-07-18 PROCEDURE — 80053 COMPREHEN METABOLIC PANEL: CPT

## 2022-07-18 PROCEDURE — 85025 COMPLETE CBC W/AUTO DIFF WBC: CPT

## 2022-07-18 PROCEDURE — 84443 ASSAY THYROID STIM HORMONE: CPT

## 2022-07-18 PROCEDURE — 80061 LIPID PANEL: CPT

## 2022-07-18 PROCEDURE — 36415 COLL VENOUS BLD VENIPUNCTURE: CPT

## 2022-07-19 NOTE — RESULT ENCOUNTER NOTE
Call and advise patient that the results showed elevated cholesterol for which patient should begin statin.

## 2022-07-29 ENCOUNTER — OFFICE VISIT (OUTPATIENT)
Dept: PRIMARY CARE CLINIC | Age: 77
End: 2022-07-29
Payer: MEDICARE

## 2022-07-29 VITALS
OXYGEN SATURATION: 98 % | WEIGHT: 191.4 LBS | BODY MASS INDEX: 30.76 KG/M2 | HEIGHT: 66 IN | HEART RATE: 76 BPM | DIASTOLIC BLOOD PRESSURE: 80 MMHG | SYSTOLIC BLOOD PRESSURE: 122 MMHG

## 2022-07-29 DIAGNOSIS — K04.7 TOOTH INFECTION: ICD-10-CM

## 2022-07-29 DIAGNOSIS — R14.1 ABDOMINAL GAS PAIN: ICD-10-CM

## 2022-07-29 DIAGNOSIS — R06.02 SOB (SHORTNESS OF BREATH) ON EXERTION: ICD-10-CM

## 2022-07-29 DIAGNOSIS — R14.0 BLOATING: ICD-10-CM

## 2022-07-29 DIAGNOSIS — E11.65 TYPE 2 DIABETES MELLITUS WITH HYPERGLYCEMIA, WITH LONG-TERM CURRENT USE OF INSULIN (HCC): ICD-10-CM

## 2022-07-29 DIAGNOSIS — H61.22 IMPACTED CERUMEN OF LEFT EAR: ICD-10-CM

## 2022-07-29 DIAGNOSIS — Z79.4 TYPE 2 DIABETES MELLITUS WITH HYPERGLYCEMIA, WITH LONG-TERM CURRENT USE OF INSULIN (HCC): ICD-10-CM

## 2022-07-29 DIAGNOSIS — Z00.00 INITIAL MEDICARE ANNUAL WELLNESS VISIT: Primary | ICD-10-CM

## 2022-07-29 LAB — HBA1C MFR BLD: 8.2 %

## 2022-07-29 PROCEDURE — 83036 HEMOGLOBIN GLYCOSYLATED A1C: CPT | Performed by: PHYSICIAN ASSISTANT

## 2022-07-29 PROCEDURE — 1123F ACP DISCUSS/DSCN MKR DOCD: CPT | Performed by: PHYSICIAN ASSISTANT

## 2022-07-29 PROCEDURE — G0438 PPPS, INITIAL VISIT: HCPCS | Performed by: PHYSICIAN ASSISTANT

## 2022-07-29 PROCEDURE — 69210 REMOVE IMPACTED EAR WAX UNI: CPT | Performed by: PHYSICIAN ASSISTANT

## 2022-07-29 PROCEDURE — 3051F HG A1C>EQUAL 7.0%<8.0%: CPT | Performed by: PHYSICIAN ASSISTANT

## 2022-07-29 RX ORDER — SIMETHICONE 80 MG
80 TABLET,CHEWABLE ORAL 4 TIMES DAILY PRN
Qty: 180 TABLET | Refills: 3 | Status: SHIPPED | OUTPATIENT
Start: 2022-07-29 | End: 2022-08-04

## 2022-07-29 RX ORDER — DOXYCYCLINE HYCLATE 100 MG
100 TABLET ORAL 2 TIMES DAILY
Qty: 20 TABLET | Refills: 0 | Status: SHIPPED | OUTPATIENT
Start: 2022-07-29 | End: 2022-08-08

## 2022-07-29 ASSESSMENT — PATIENT HEALTH QUESTIONNAIRE - PHQ9
SUM OF ALL RESPONSES TO PHQ QUESTIONS 1-9: 0
2. FEELING DOWN, DEPRESSED OR HOPELESS: 0
SUM OF ALL RESPONSES TO PHQ QUESTIONS 1-9: 0
1. LITTLE INTEREST OR PLEASURE IN DOING THINGS: 0
SUM OF ALL RESPONSES TO PHQ9 QUESTIONS 1 & 2: 0

## 2022-07-29 ASSESSMENT — LIFESTYLE VARIABLES
HOW OFTEN DO YOU HAVE A DRINK CONTAINING ALCOHOL: MONTHLY OR LESS
HOW MANY STANDARD DRINKS CONTAINING ALCOHOL DO YOU HAVE ON A TYPICAL DAY: 1 OR 2

## 2022-07-29 NOTE — PROGRESS NOTES
Not following anybody for heart. Patient's complete Health Risk Assessment and screening values have been reviewed and are found in Flowsheets. The following problems were reviewed today and where indicated follow up appointments were made and/or referrals ordered. Positive Risk Factor Screenings with Interventions:             General Health and ACP:  General  In general, how would you say your health is?: Fair  In the past 7 days, have you experienced any of the following: New or Increased Pain, New or Increased Fatigue, Loneliness, Social Isolation, Stress or Anger?: No  Do you get the social and emotional support that you need?: Yes  Do you have a Living Will?: Yes    Advance Directives       Power of  Living Will ACP-Advance Directive ACP-Power of     Not on File Not on File Not on File Not on File        General Health Risk Interventions:  No concerns. Health Habits/Nutrition:  Physical Activity: Inactive    Days of Exercise per Week: 0 days    Minutes of Exercise per Session: 0 min     Have you lost any weight without trying in the past 3 months?: No  Body mass index: (!) 30.89  Have you seen the dentist within the past year?: Yes  Health Habits/Nutrition Interventions:  none    Hearing/Vision:  Do you or your family notice any trouble with your hearing that hasn't been managed with hearing aids?: (!) Yes  Do you have difficulty driving, watching TV, or doing any of your daily activities because of your eyesight?: No  Have you had an eye exam within the past year?: Yes  No results found.   Hearing/Vision Interventions:  Mirella pulido.     Safety:  Do you have working smoke detectors?: Yes  Do you have any tripping hazards - loose or unsecured carpets or rugs?: No  Do you have any tripping hazards - clutter in doorways, halls, or stairs?: No  Do you have either shower bars, grab bars, non-slip mats or non-slip surfaces in your shower or bathtub?: (!) No  Do all of your stairways have a railing or banister?: Yes  Do you always fasten your seatbelt when you are in a car?: Yes  Safety Interventions:  Home safety tips provided           Objective   Vitals:    07/29/22 1103   BP: 122/80   Pulse: 76   SpO2: 98%   Weight: 191 lb 6.4 oz (86.8 kg)   Height: 5' 6\" (1.676 m)      Body mass index is 30.89 kg/m². General Appearance: alert and oriented to person, place and time, well developed and well- nourished, in no acute distress  Skin: warm and dry, no rash or erythema  Head: normocephalic and atraumatic  Eyes: pupils equal, round, and reactive to light, extraocular eye movements intact, conjunctivae normal  ENT: tympanic membrane, external ear and ear canal normal bilaterally, nose without deformity, nasal mucosa and turbinates normal without polyps  Neck: supple and non-tender without mass, no thyromegaly or thyroid nodules, no cervical lymphadenopathy  Pulmonary/Chest: clear to auscultation bilaterally- no wheezes, rales or rhonchi, normal air movement, no respiratory distress  Cardiovascular: normal rate, regular rhythm, normal S1 and S2, no murmurs, rubs, clicks, or gallops, distal pulses intact, no carotid bruits  Abdomen: soft, non-tender, non-distended, normal bowel sounds, no masses or organomegaly  Extremities: no cyanosis, clubbing or edema  Musculoskeletal: normal range of motion, no joint swelling, deformity or tenderness  Neurologic: reflexes normal and symmetric, no cranial nerve deficit, gait, coordination and speech normal       Allergies   Allergen Reactions    Aspirin Other (See Comments)    Influenza Vaccines Other (See Comments)    Penicillins     Pneumococcal Vaccine     Seasonal      Prior to Visit Medications    Medication Sig Taking?  Authorizing Provider   simethicone (MYLICON) 80 MG chewable tablet Take 1 tablet by mouth 4 times daily as needed for Flatulence Yes MAYRA Tong   empagliflozin (JARDIANCE) 10 MG tablet Take 1 tablet by mouth in the morning. Yes MAYRA Gill   doxycycline hyclate (VIBRA-TABS) 100 MG tablet Take 1 tablet by mouth in the morning and 1 tablet before bedtime. Do all this for 10 days. Yes MAYRA Gill   gemfibrozil (LOPID) 600 MG tablet TAKE 1 TABLET BY MOUTH TWO TIMES A DAY Yes MAYRA Gill   glipiZIDE (GLUCOTROL) 10 MG tablet TAKE 1 TABLET BY MOUTH TWO TIMES A DAY before meals Yes MAYRA Gill   metFORMIN (GLUCOPHAGE-XR) 500 MG extended release tablet TAKE 1 TABLET BY MOUTH EVERY DAY WITH BREAKFAST Yes Russell Morrison PA-C   Blood Glucose Monitoring Suppl ([address]) w/Device KIT USE AS DIRECTED TO TEST THREE TIMES DAILY Yes Historical Provider, MD   pantoprazole (PROTONIX) 40 MG tablet Take 1 tablet by mouth daily Yes MAYRA Gill   insulin 70-30 (NOVOLIN 70/30) (70-30) 100 UNIT per ML injection vial Inject 45 Units into the skin 2 times daily Yes MAYRA Gill   blood glucose monitor strips Test 3 times a day & as needed for symptoms of irregular blood glucose. Dispense sufficient amount for indicated testing frequency plus additional to accommodate PRN testing needs. Yes MAYRA Gill   Insulin Syringe-Needle U-100 (B-D INS SYR ULTRAFINE .5CC/30G) 30G X 1/2\" 0.5 ML MISC 1/2cc/ml syringe with 32g x 8mm needle. May use any brand  E11.9 Yes MAYRA Gill   meclizine (ANTIVERT) 25 MG CHEW Take 1 tablet by mouth 3 times daily as needed (dizziness) Yes MAYRA Gill       CareTeam (Including outside providers/suppliers regularly involved in providing care):   Patient Care Team:  MAYRA Gill as PCP - General (Physician Assistant)  MAYRA Gill as PCP - [facility] Empaneled Provider     Reviewed and updated this visit:  Allergies  Meds  Med Hx  Surg Hx  Soc Hx  Fam Hx      Patient had multiple concerns today. Jaw pain appears to be superficial and not related to heart. Have started an antibiotic for this.   Shortness of breath on exertion has been ongoing will order stress test for this. For patient's abdominal pain I did order Gas-X have patient begin this and follow-up if no improvement. CT of the abdomen ordered. New diabetic medication ordered as A1c has jumped to 8.2.

## 2022-07-29 NOTE — PATIENT INSTRUCTIONS
Personalized Preventive Plan for [employer] - 7/29/2022  Medicare offers a range of preventive health benefits. Some of the tests and screenings are paid in full while other may be subject to a deductible, co-insurance, and/or copay. Some of these benefits include a comprehensive review of your medical history including lifestyle, illnesses that may run in your family, and various assessments and screenings as appropriate. After reviewing your medical record and screening and assessments performed today your provider may have ordered immunizations, labs, imaging, and/or referrals for you. A list of these orders (if applicable) as well as your Preventive Care list are included within your After Visit Summary for your review. Other Preventive Recommendations:    A preventive eye exam performed by an eye specialist is recommended every 1-2 years to screen for glaucoma; cataracts, macular degeneration, and other eye disorders. A preventive dental visit is recommended every 6 months. Try to get at least 150 minutes of exercise per week or 10,000 steps per day on a pedometer . Order or download the FREE \"Exercise & Physical Activity: Your Everyday Guide\" from [employer]. Call 5-417.400.2833 or search [employer] online. You need 5458-4680 mg of calcium and 0135-6339 IU of vitamin D per day. It is possible to meet your calcium requirement with diet alone, but a vitamin D supplement is usually necessary to meet this goal.  When exposed to the sun, use a sunscreen that protects against both UVA and UVB radiation with an SPF of 30 or greater. Reapply every 2 to 3 hours or after sweating, drying off with a towel, or swimming. Always wear a seat belt when traveling in a car. Always wear a helmet when riding a bicycle or motorcycle.

## 2022-08-02 ENCOUNTER — HOSPITAL ENCOUNTER (OUTPATIENT)
Dept: CT IMAGING | Age: 77
Discharge: HOME | DRG: 948 | End: 2022-08-04
Payer: MEDICARE

## 2022-08-02 ENCOUNTER — APPOINTMENT (OUTPATIENT)
Dept: CT IMAGING | Age: 77
DRG: 948 | End: 2022-08-02
Payer: MEDICARE

## 2022-08-02 ENCOUNTER — HOSPITAL ENCOUNTER (INPATIENT)
Age: 77
LOS: 2 days | Discharge: HOME | DRG: 948 | End: 2022-08-04
Attending: EMERGENCY MEDICINE | Admitting: INTERNAL MEDICINE
Payer: MEDICARE

## 2022-08-02 ENCOUNTER — HOSPITAL ENCOUNTER (OUTPATIENT)
Age: 77
Discharge: HOME | DRG: 948 | End: 2022-08-02
Payer: MEDICARE

## 2022-08-02 DIAGNOSIS — R06.00 DYSPNEA, UNSPECIFIED TYPE: Primary | ICD-10-CM

## 2022-08-02 DIAGNOSIS — R06.02 SOB (SHORTNESS OF BREATH) ON EXERTION: ICD-10-CM

## 2022-08-02 DIAGNOSIS — R77.8 ELEVATED TROPONIN: ICD-10-CM

## 2022-08-02 DIAGNOSIS — K21.9 GASTROESOPHAGEAL REFLUX DISEASE WITHOUT ESOPHAGITIS: ICD-10-CM

## 2022-08-02 DIAGNOSIS — R14.0 BLOATING: ICD-10-CM

## 2022-08-02 DIAGNOSIS — Z79.4 TYPE 2 DIABETES MELLITUS WITH HYPERGLYCEMIA, WITH LONG-TERM CURRENT USE OF INSULIN (HCC): ICD-10-CM

## 2022-08-02 DIAGNOSIS — E11.42 DIABETIC POLYNEUROPATHY ASSOCIATED WITH TYPE 2 DIABETES MELLITUS (HCC): ICD-10-CM

## 2022-08-02 DIAGNOSIS — R14.1 ABDOMINAL GAS PAIN: ICD-10-CM

## 2022-08-02 DIAGNOSIS — E11.65 TYPE 2 DIABETES MELLITUS WITH HYPERGLYCEMIA, WITH LONG-TERM CURRENT USE OF INSULIN (HCC): ICD-10-CM

## 2022-08-02 PROBLEM — R79.89 ELEVATED TROPONIN: Status: ACTIVE | Noted: 2022-01-01

## 2022-08-02 LAB
ABSOLUTE EOS #: 0.2 K/UL (ref 0–0.4)
ABSOLUTE LYMPH #: 3.4 K/UL (ref 1–4.8)
ABSOLUTE MONO #: 0.5 K/UL (ref 0.1–1.2)
ANION GAP SERPL CALCULATED.3IONS-SCNC: 10 MMOL/L (ref 9–17)
BASOPHILS # BLD: 1 % (ref 0–2)
BASOPHILS ABSOLUTE: 0 K/UL (ref 0–0.2)
BUN BLDV-MCNC: 14 MG/DL (ref 8–23)
CALCIUM SERPL-MCNC: 9.5 MG/DL (ref 8.6–10.4)
CHLORIDE BLD-SCNC: 100 MMOL/L (ref 98–107)
CO2: 27 MMOL/L (ref 20–31)
CREAT SERPL-MCNC: 0.78 MG/DL (ref 0.5–0.9)
CREAT SERPL-MCNC: 0.84 MG/DL (ref 0.5–0.9)
D-DIMER QUANTITATIVE: 0.66 MG/L FEU
EOSINOPHILS RELATIVE PERCENT: 3 % (ref 1–4)
GFR AFRICAN AMERICAN: >60 ML/MIN
GFR AFRICAN AMERICAN: >60 ML/MIN
GFR NON-AFRICAN AMERICAN: >60 ML/MIN
GFR NON-AFRICAN AMERICAN: >60 ML/MIN
GFR SERPL CREATININE-BSD FRML MDRD: ABNORMAL ML/MIN/{1.73_M2}
GFR SERPL CREATININE-BSD FRML MDRD: NORMAL ML/MIN/{1.73_M2}
GLUCOSE BLD-MCNC: 55 MG/DL (ref 65–105)
GLUCOSE BLD-MCNC: 57 MG/DL (ref 70–99)
HCT VFR BLD CALC: 40.9 % (ref 36–46)
HEMOGLOBIN: 14 G/DL (ref 12–16)
LYMPHOCYTES # BLD: 55 % (ref 24–44)
MCH RBC QN AUTO: 31.9 PG (ref 26–34)
MCHC RBC AUTO-ENTMCNC: 34.2 G/DL (ref 31–37)
MCV RBC AUTO: 93.4 FL (ref 80–100)
MONOCYTES # BLD: 8 % (ref 2–11)
PDW BLD-RTO: 13 % (ref 12.5–15.4)
PLATELET # BLD: 249 K/UL (ref 140–450)
PMV BLD AUTO: 7.1 FL (ref 6–12)
POTASSIUM SERPL-SCNC: 3.8 MMOL/L (ref 3.7–5.3)
PRO-BNP: 131 PG/ML
RBC # BLD: 4.38 M/UL (ref 4–5.2)
SARS-COV-2, RAPID: NOT DETECTED
SEG NEUTROPHILS: 33 % (ref 36–66)
SEGMENTED NEUTROPHILS ABSOLUTE COUNT: 2.1 K/UL (ref 1.8–7.7)
SODIUM BLD-SCNC: 137 MMOL/L (ref 135–144)
SPECIMEN DESCRIPTION: NORMAL
TROPONIN, HIGH SENSITIVITY: 130 NG/L (ref 0–14)
TROPONIN, HIGH SENSITIVITY: 154 NG/L (ref 0–14)
WBC # BLD: 6.2 K/UL (ref 3.5–11)

## 2022-08-02 PROCEDURE — 93005 ELECTROCARDIOGRAM TRACING: CPT | Performed by: EMERGENCY MEDICINE

## 2022-08-02 PROCEDURE — 99285 EMERGENCY DEPT VISIT HI MDM: CPT

## 2022-08-02 PROCEDURE — 2580000003 HC RX 258: Performed by: PHYSICIAN ASSISTANT

## 2022-08-02 PROCEDURE — 87635 SARS-COV-2 COVID-19 AMP PRB: CPT

## 2022-08-02 PROCEDURE — 6360000004 HC RX CONTRAST MEDICATION: Performed by: PHYSICIAN ASSISTANT

## 2022-08-02 PROCEDURE — 84484 ASSAY OF TROPONIN QUANT: CPT

## 2022-08-02 PROCEDURE — 80048 BASIC METABOLIC PNL TOTAL CA: CPT

## 2022-08-02 PROCEDURE — 85379 FIBRIN DEGRADATION QUANT: CPT

## 2022-08-02 PROCEDURE — 96372 THER/PROPH/DIAG INJ SC/IM: CPT

## 2022-08-02 PROCEDURE — 82947 ASSAY GLUCOSE BLOOD QUANT: CPT

## 2022-08-02 PROCEDURE — 2060000000 HC ICU INTERMEDIATE R&B

## 2022-08-02 PROCEDURE — 6360000002 HC RX W HCPCS: Performed by: EMERGENCY MEDICINE

## 2022-08-02 PROCEDURE — 82565 ASSAY OF CREATININE: CPT

## 2022-08-02 PROCEDURE — 6360000004 HC RX CONTRAST MEDICATION: Performed by: EMERGENCY MEDICINE

## 2022-08-02 PROCEDURE — 83880 ASSAY OF NATRIURETIC PEPTIDE: CPT

## 2022-08-02 PROCEDURE — 71260 CT THORAX DX C+: CPT | Performed by: EMERGENCY MEDICINE

## 2022-08-02 PROCEDURE — 36415 COLL VENOUS BLD VENIPUNCTURE: CPT

## 2022-08-02 PROCEDURE — 85025 COMPLETE CBC W/AUTO DIFF WBC: CPT

## 2022-08-02 PROCEDURE — 74177 CT ABD & PELVIS W/CONTRAST: CPT

## 2022-08-02 PROCEDURE — 2580000003 HC RX 258: Performed by: EMERGENCY MEDICINE

## 2022-08-02 PROCEDURE — 6370000000 HC RX 637 (ALT 250 FOR IP): Performed by: EMERGENCY MEDICINE

## 2022-08-02 RX ORDER — 0.9 % SODIUM CHLORIDE 0.9 %
80 INTRAVENOUS SOLUTION INTRAVENOUS ONCE
Status: DISCONTINUED | OUTPATIENT
Start: 2022-08-02 | End: 2022-08-05 | Stop reason: HOSPADM

## 2022-08-02 RX ORDER — ENOXAPARIN SODIUM 100 MG/ML
1 INJECTION SUBCUTANEOUS ONCE
Status: COMPLETED | OUTPATIENT
Start: 2022-08-02 | End: 2022-08-02

## 2022-08-02 RX ORDER — ASPIRIN 81 MG/1
324 TABLET, CHEWABLE ORAL ONCE
Status: COMPLETED | OUTPATIENT
Start: 2022-08-02 | End: 2022-08-02

## 2022-08-02 RX ORDER — 0.9 % SODIUM CHLORIDE 0.9 %
80 INTRAVENOUS SOLUTION INTRAVENOUS ONCE
Status: DISCONTINUED | OUTPATIENT
Start: 2022-08-02 | End: 2022-08-03

## 2022-08-02 RX ORDER — MAGNESIUM HYDROXIDE/ALUMINUM HYDROXICE/SIMETHICONE 120; 1200; 1200 MG/30ML; MG/30ML; MG/30ML
15 SUSPENSION ORAL ONCE
Status: COMPLETED | OUTPATIENT
Start: 2022-08-02 | End: 2022-08-02

## 2022-08-02 RX ORDER — SODIUM CHLORIDE 0.9 % (FLUSH) 0.9 %
10 SYRINGE (ML) INJECTION PRN
Status: DISCONTINUED | OUTPATIENT
Start: 2022-08-02 | End: 2022-08-05 | Stop reason: HOSPADM

## 2022-08-02 RX ORDER — 0.9 % SODIUM CHLORIDE 0.9 %
500 INTRAVENOUS SOLUTION INTRAVENOUS ONCE
Status: COMPLETED | OUTPATIENT
Start: 2022-08-02 | End: 2022-08-02

## 2022-08-02 RX ORDER — SODIUM CHLORIDE 0.9 % (FLUSH) 0.9 %
10 SYRINGE (ML) INJECTION PRN
Status: DISCONTINUED | OUTPATIENT
Start: 2022-08-02 | End: 2022-08-03

## 2022-08-02 RX ADMIN — ALUMINUM HYDROXIDE, MAGNESIUM HYDROXIDE, AND SIMETHICONE 15 ML: 200; 200; 20 SUSPENSION ORAL at 17:05

## 2022-08-02 RX ADMIN — IOPAMIDOL 75 ML: 755 INJECTION, SOLUTION INTRAVENOUS at 16:48

## 2022-08-02 RX ADMIN — ASPIRIN 81 MG CHEWABLE TABLET 324 MG: 81 TABLET CHEWABLE at 17:05

## 2022-08-02 RX ADMIN — SODIUM CHLORIDE 500 ML: 9 INJECTION, SOLUTION INTRAVENOUS at 18:09

## 2022-08-02 RX ADMIN — Medication 80 ML: at 16:48

## 2022-08-02 RX ADMIN — ENOXAPARIN SODIUM 90 MG: 100 INJECTION SUBCUTANEOUS at 18:10

## 2022-08-02 RX ADMIN — SODIUM CHLORIDE, PRESERVATIVE FREE 10 ML: 5 INJECTION INTRAVENOUS at 16:49

## 2022-08-02 RX ADMIN — Medication 80 ML: at 15:15

## 2022-08-02 RX ADMIN — IOPAMIDOL 75 ML: 755 INJECTION, SOLUTION INTRAVENOUS at 15:08

## 2022-08-02 RX ADMIN — SODIUM CHLORIDE, PRESERVATIVE FREE 10 ML: 5 INJECTION INTRAVENOUS at 15:15

## 2022-08-02 ASSESSMENT — PAIN - FUNCTIONAL ASSESSMENT: PAIN_FUNCTIONAL_ASSESSMENT: NONE - DENIES PAIN

## 2022-08-02 NOTE — ED PROVIDER NOTES
frequency plus additional to accommodate PRN testing needs. BLOOD GLUCOSE MONITORING SUPPL (ONETOUCH VERIO FLEX SYSTEM) W/DEVICE KIT    USE AS DIRECTED TO TEST THREE TIMES DAILY    DOXYCYCLINE HYCLATE (VIBRA-TABS) 100 MG TABLET    Take 1 tablet by mouth in the morning and 1 tablet before bedtime. Do all this for 10 days. EMPAGLIFLOZIN (JARDIANCE) 10 MG TABLET    Take 1 tablet by mouth in the morning. GEMFIBROZIL (LOPID) 600 MG TABLET    TAKE 1 TABLET BY MOUTH TWO TIMES A DAY    GLIPIZIDE (GLUCOTROL) 10 MG TABLET    TAKE 1 TABLET BY MOUTH TWO TIMES A DAY before meals    INSULIN 70-30 (NOVOLIN 70/30) (70-30) 100 UNIT PER ML INJECTION VIAL    Inject 45 Units into the skin 2 times daily    INSULIN SYRINGE-NEEDLE U-100 (B-D INS SYR ULTRAFINE .5CC/30G) 30G X 1/2\" 0.5 ML MISC    1/2cc/ml syringe with 32g x 8mm needle. May use any brand  E11.9    MECLIZINE (ANTIVERT) 25 MG CHEW    Take 1 tablet by mouth 3 times daily as needed (dizziness)    METFORMIN (GLUCOPHAGE-XR) 500 MG EXTENDED RELEASE TABLET    TAKE 1 TABLET BY MOUTH EVERY DAY WITH BREAKFAST    PANTOPRAZOLE (PROTONIX) 40 MG TABLET    Take 1 tablet by mouth daily    SIMETHICONE (MYLICON) 80 MG CHEWABLE TABLET    Take 1 tablet by mouth 4 times daily as needed for Flatulence       ALLERGIES     is allergic to aspirin, influenza vaccines, penicillins, pneumococcal vaccine, and seasonal.    FAMILY HISTORY     She indicated that the status of her mother is unknown. She indicated that the status of her father is unknown. She indicated that the status of her sister is unknown. She indicated that the status of her brother is unknown.     family history includes Cancer in her brother, mother, and sister; Heart Disease in her father; Kidney Disease in her mother; Other in her mother; Stroke in her father. SOCIAL HISTORY      reports that she has never smoked.  She has never used smokeless tobacco. She reports that she does not drink alcohol and does not use drugs.    PHYSICAL EXAM       ED Triage Vitals [08/02/22 1530]   BP Temp Temp Source Heart Rate Resp SpO2 Height Weight   (!) 150/56 98 °F (36.7 °C) Oral 68 18 97 % -- --       Constitutional: Alert, oriented x3, nontoxic, answering questions appropriately, acting properly for age, in no acute distress   HEENT: Extraocular muscles intact,  Neck: Trachea midline   Cardiovascular: Regular rhythm and rate no murmurs   Respiratory: Clear to auscultation bilaterally no wheezes, rhonchi, rales, no respiratory distress no tachypnea no retractions no hypoxia  Gastrointestinal: Soft, nontender, nondistended, positive bowel sounds. No rebound, rigidity, or guarding. Musculoskeletal: No extremity pain or swelling no calf tenderness or asymmetry  Neurologic: Moving all 4 extremities without difficulty there are no gross focal neurologic deficits   Skin: Warm and dry       DIFFERENTIAL DIAGNOSIS/ MDM:     IV labs. EKG. Chest imaging. DIAGNOSTIC RESULTS     EKG: All EKG's are interpreted by the Emergency Department Physician who either signs or Co-signs this chart in the absence of a cardiologist.    1538 sinus rhythm rate 71  QRS 88  no acute ST or T wave changes. Not indicated unless otherwise documented above    LABS:  Results for orders placed or performed during the hospital encounter of 08/02/22   COVID-19, Rapid    Specimen: Nasopharyngeal Swab   Result Value Ref Range    Specimen Description . NASOPHARYNGEAL SWAB     SARS-CoV-2, Rapid Not Detected Not Detected   CBC with Auto Differential   Result Value Ref Range    WBC 6.2 3.5 - 11.0 k/uL    RBC 4.38 4.0 - 5.2 m/uL    Hemoglobin 14.0 12.0 - 16.0 g/dL    Hematocrit 40.9 36 - 46 %    MCV 93.4 80 - 100 fL    MCH 31.9 26 - 34 pg    MCHC 34.2 31 - 37 g/dL    RDW 13.0 12.5 - 15.4 %    Platelets 622 599 - 981 k/uL    MPV 7.1 6.0 - 12.0 fL    Seg Neutrophils 33 (L) 36 - 66 %    Lymphocytes 55 (H) 24 - 44 %    Monocytes 8 2 - 11 %    Eosinophils % 3 1 - 4 % Basophils 1 0 - 2 %    Segs Absolute 2.10 1.8 - 7.7 k/uL    Absolute Lymph # 3.40 1.0 - 4.8 k/uL    Absolute Mono # 0.50 0.1 - 1.2 k/uL    Absolute Eos # 0.20 0.0 - 0.4 k/uL    Basophils Absolute 0.00 0.0 - 0.2 k/uL   Basic Metabolic Panel   Result Value Ref Range    Glucose 57 (L) 70 - 99 mg/dL    BUN 14 8 - 23 mg/dL    Creatinine 0.78 0.50 - 0.90 mg/dL    Calcium 9.5 8.6 - 10.4 mg/dL    Sodium 137 135 - 144 mmol/L    Potassium 3.8 3.7 - 5.3 mmol/L    Chloride 100 98 - 107 mmol/L    CO2 27 20 - 31 mmol/L    Anion Gap 10 9 - 17 mmol/L    GFR Non-African American >60 >60 mL/min    GFR African American >60 >60 mL/min    GFR Comment         D-Dimer, Quantitative   Result Value Ref Range    D-Dimer, Quant 0.66 mg/L FEU   Troponin   Result Value Ref Range    Troponin, High Sensitivity 154 (HH) 0 - 14 ng/L   POC Glucose Fingerstick   Result Value Ref Range    POC Glucose 55 (L) 65 - 105 mg/dL       Not indicated unless otherwise documented above    RADIOLOGY:   I reviewed the radiologist interpretations:    CT CHEST PULMONARY EMBOLISM W CONTRAST   Final Result   1. No evidence of pulmonary embolism. 2.  Diffuse mild ground glass parenchymal appearance with hazy bordered   vessels without overt edema, appearance favoring pulmonary venous congestion. No localized consolidation or suspicious nodules.              Not indicated unless otherwise documented above    EMERGENCY DEPARTMENT COURSE:     The patient was given the following medications:  Orders Placed This Encounter   Medications    0.9 % sodium chloride bolus    iopamidol (ISOVUE-370) 76 % injection 75 mL    sodium chloride flush 0.9 % injection 10 mL    aspirin chewable tablet 324 mg    aluminum & magnesium hydroxide-simethicone (MAALOX) 200-200-20 MG/5ML suspension 15 mL    enoxaparin (LOVENOX) injection 90 mg     Order Specific Question:   Indication of Use     Answer:   ACS    0.9 % sodium chloride bolus        Vitals:   -------------------------  BP 135/67   Pulse 65   Temp 98 °F (36.7 °C) (Oral)   Resp 17   SpO2 93%     5:45 PM elevated D-dimer prompted a CT of the chest which was negative for pulmonary embolism but did show groundglass appearance suggesting congestion we will add a COVID test.  Troponin went up from 1 30 to 154 patient denies chest pain will give aspirin. Case was discussed with Dr. Katina Parker cardiology who agrees the patient should be transferred and would like a full dose of Lovenox. Will discuss with hospitalist.    650 PM Discussed with Mima Wilkinson who agrees to admission. Awaiting bed assignment and transport. Under Dr. Liz Boss. CRITICAL CARE:    None    CONSULTS:    None    PROCEDURES:    None      O[facility] Report if indicated             FINAL IMPRESSION      1. Dyspnea, unspecified type    2. Elevated troponin          DISPOSITION/PLAN   DISPOSITION Decision To Transfer 08/02/2022 05:38:07 PM        CONDITION ON DISPOSITION: STABLE       PATIENT REFERRED TO:  No follow-up provider specified.     DISCHARGE MEDICATIONS:  New Prescriptions    No medications on file       (Please note that portions of this note were completed with a voice recognition program.  Efforts were made to edit the dictations but occasionally words are mis-transcribed.)    Omar Dejesus DO   Attending Emergency Physician       Omar Dejesus DO  08/02/22 0940

## 2022-08-03 ENCOUNTER — APPOINTMENT (OUTPATIENT)
Dept: NUCLEAR MEDICINE | Age: 77
DRG: 948 | End: 2022-08-03
Payer: MEDICARE

## 2022-08-03 PROBLEM — R91.8 GROUND GLASS OPACITY PRESENT ON IMAGING OF LUNG: Status: ACTIVE | Noted: 2022-08-03

## 2022-08-03 PROBLEM — E16.2 HYPOGLYCEMIA: Status: ACTIVE | Noted: 2022-08-03

## 2022-08-03 LAB
ADENOVIRUS PCR: NOT DETECTED
BORDETELLA PARAPERTUSSIS: NOT DETECTED
BORDETELLA PERTUSSIS PCR: NOT DETECTED
CHLAMYDIA PNEUMONIAE BY PCR: NOT DETECTED
CHOLESTEROL/HDL RATIO: 8.7
CHOLESTEROL: 225 MG/DL
CORONAVIRUS 229E PCR: NOT DETECTED
CORONAVIRUS HKU1 PCR: NOT DETECTED
CORONAVIRUS NL63 PCR: NOT DETECTED
CORONAVIRUS OC43 PCR: NOT DETECTED
EKG ATRIAL RATE: 71 BPM
EKG P AXIS: 22 DEGREES
EKG P-R INTERVAL: 132 MS
EKG Q-T INTERVAL: 352 MS
EKG QRS DURATION: 88 MS
EKG QTC CALCULATION (BAZETT): 382 MS
EKG R AXIS: -36 DEGREES
EKG T AXIS: 54 DEGREES
EKG VENTRICULAR RATE: 71 BPM
GLUCOSE BLD-MCNC: 162 MG/DL (ref 65–105)
GLUCOSE BLD-MCNC: 168 MG/DL (ref 65–105)
GLUCOSE BLD-MCNC: 199 MG/DL (ref 65–105)
HDLC SERPL-MCNC: 26 MG/DL
HUMAN METAPNEUMOVIRUS PCR: NOT DETECTED
INFLUENZA A BY PCR: NOT DETECTED
INFLUENZA B BY PCR: NOT DETECTED
LDL CHOLESTEROL: 157 MG/DL (ref 0–130)
LEGIONELLA PNEUMOPHILIA AG, URINE: NEGATIVE
MYCOPLASMA PNEUMONIAE PCR: NOT DETECTED
PARAINFLUENZA 1 PCR: NOT DETECTED
PARAINFLUENZA 2 PCR: NOT DETECTED
PARAINFLUENZA 3 PCR: NOT DETECTED
PARAINFLUENZA 4 PCR: NOT DETECTED
PRO-BNP: 124 PG/ML
RESP SYNCYTIAL VIRUS PCR: NOT DETECTED
RHINO/ENTEROVIRUS PCR: NOT DETECTED
SARS-COV-2, PCR: NOT DETECTED
SPECIMEN DESCRIPTION: NORMAL
TRIGL SERPL-MCNC: 210 MG/DL
TROPONIN, HIGH SENSITIVITY: 117 NG/L (ref 0–14)
TROPONIN, HIGH SENSITIVITY: 152 NG/L (ref 0–14)

## 2022-08-03 PROCEDURE — 82947 ASSAY GLUCOSE BLOOD QUANT: CPT

## 2022-08-03 PROCEDURE — 6370000000 HC RX 637 (ALT 250 FOR IP): Performed by: NURSE PRACTITIONER

## 2022-08-03 PROCEDURE — 87449 NOS EACH ORGANISM AG IA: CPT

## 2022-08-03 PROCEDURE — 83880 ASSAY OF NATRIURETIC PEPTIDE: CPT

## 2022-08-03 PROCEDURE — 36415 COLL VENOUS BLD VENIPUNCTURE: CPT

## 2022-08-03 PROCEDURE — 84484 ASSAY OF TROPONIN QUANT: CPT

## 2022-08-03 PROCEDURE — 86738 MYCOPLASMA ANTIBODY: CPT

## 2022-08-03 PROCEDURE — 6370000000 HC RX 637 (ALT 250 FOR IP): Performed by: FAMILY MEDICINE

## 2022-08-03 PROCEDURE — A9500 TC99M SESTAMIBI: HCPCS | Performed by: INTERNAL MEDICINE

## 2022-08-03 PROCEDURE — 2060000000 HC ICU INTERMEDIATE R&B

## 2022-08-03 PROCEDURE — 93970 EXTREMITY STUDY: CPT

## 2022-08-03 PROCEDURE — 6360000002 HC RX W HCPCS: Performed by: NURSE PRACTITIONER

## 2022-08-03 PROCEDURE — 2580000003 HC RX 258: Performed by: NURSE PRACTITIONER

## 2022-08-03 PROCEDURE — 2580000003 HC RX 258: Performed by: INTERNAL MEDICINE

## 2022-08-03 PROCEDURE — 6370000000 HC RX 637 (ALT 250 FOR IP): Performed by: STUDENT IN AN ORGANIZED HEALTH CARE EDUCATION/TRAINING PROGRAM

## 2022-08-03 PROCEDURE — 78452 HT MUSCLE IMAGE SPECT MULT: CPT

## 2022-08-03 PROCEDURE — 99223 1ST HOSP IP/OBS HIGH 75: CPT | Performed by: FAMILY MEDICINE

## 2022-08-03 PROCEDURE — 76937 US GUIDE VASCULAR ACCESS: CPT

## 2022-08-03 PROCEDURE — 0202U NFCT DS 22 TRGT SARS-COV-2: CPT

## 2022-08-03 PROCEDURE — 3430000000 HC RX DIAGNOSTIC RADIOPHARMACEUTICAL: Performed by: INTERNAL MEDICINE

## 2022-08-03 PROCEDURE — 80061 LIPID PANEL: CPT

## 2022-08-03 PROCEDURE — 6360000002 HC RX W HCPCS: Performed by: STUDENT IN AN ORGANIZED HEALTH CARE EDUCATION/TRAINING PROGRAM

## 2022-08-03 RX ORDER — SODIUM CHLORIDE 0.9 % (FLUSH) 0.9 %
5-40 SYRINGE (ML) INJECTION EVERY 12 HOURS SCHEDULED
Status: DISCONTINUED | OUTPATIENT
Start: 2022-08-03 | End: 2022-08-04 | Stop reason: HOSPADM

## 2022-08-03 RX ORDER — POTASSIUM CHLORIDE 20 MEQ/1
40 TABLET, EXTENDED RELEASE ORAL PRN
Status: DISCONTINUED | OUTPATIENT
Start: 2022-08-03 | End: 2022-08-04 | Stop reason: HOSPADM

## 2022-08-03 RX ORDER — ATORVASTATIN CALCIUM 80 MG/1
80 TABLET, FILM COATED ORAL NIGHTLY
Status: DISCONTINUED | OUTPATIENT
Start: 2022-08-03 | End: 2022-08-04

## 2022-08-03 RX ORDER — ENOXAPARIN SODIUM 100 MG/ML
1 INJECTION SUBCUTANEOUS 2 TIMES DAILY
Status: DISCONTINUED | OUTPATIENT
Start: 2022-08-03 | End: 2022-08-04 | Stop reason: HOSPADM

## 2022-08-03 RX ORDER — MAGNESIUM SULFATE 1 G/100ML
1000 INJECTION INTRAVENOUS PRN
Status: DISCONTINUED | OUTPATIENT
Start: 2022-08-03 | End: 2022-08-04 | Stop reason: HOSPADM

## 2022-08-03 RX ORDER — POTASSIUM CHLORIDE 7.45 MG/ML
10 INJECTION INTRAVENOUS PRN
Status: DISCONTINUED | OUTPATIENT
Start: 2022-08-03 | End: 2022-08-04 | Stop reason: HOSPADM

## 2022-08-03 RX ORDER — ACETAMINOPHEN 650 MG/1
650 SUPPOSITORY RECTAL EVERY 6 HOURS PRN
Status: DISCONTINUED | OUTPATIENT
Start: 2022-08-03 | End: 2022-08-04 | Stop reason: HOSPADM

## 2022-08-03 RX ORDER — DEXTROSE MONOHYDRATE 100 MG/ML
INJECTION, SOLUTION INTRAVENOUS CONTINUOUS PRN
Status: DISCONTINUED | OUTPATIENT
Start: 2022-08-03 | End: 2022-08-04 | Stop reason: HOSPADM

## 2022-08-03 RX ORDER — INSULIN LISPRO 100 [IU]/ML
0-4 INJECTION, SOLUTION INTRAVENOUS; SUBCUTANEOUS NIGHTLY
Status: DISCONTINUED | OUTPATIENT
Start: 2022-08-03 | End: 2022-08-04 | Stop reason: HOSPADM

## 2022-08-03 RX ORDER — FUROSEMIDE 10 MG/ML
20 INJECTION INTRAMUSCULAR; INTRAVENOUS ONCE
Status: COMPLETED | OUTPATIENT
Start: 2022-08-03 | End: 2022-08-03

## 2022-08-03 RX ORDER — ONDANSETRON 2 MG/ML
4 INJECTION INTRAMUSCULAR; INTRAVENOUS EVERY 6 HOURS PRN
Status: DISCONTINUED | OUTPATIENT
Start: 2022-08-03 | End: 2022-08-04 | Stop reason: HOSPADM

## 2022-08-03 RX ORDER — ACETAMINOPHEN 325 MG/1
650 TABLET ORAL EVERY 6 HOURS PRN
Status: DISCONTINUED | OUTPATIENT
Start: 2022-08-03 | End: 2022-08-04 | Stop reason: HOSPADM

## 2022-08-03 RX ORDER — SODIUM CHLORIDE 0.9 % (FLUSH) 0.9 %
10 SYRINGE (ML) INJECTION PRN
Status: DISCONTINUED | OUTPATIENT
Start: 2022-08-03 | End: 2022-08-04 | Stop reason: HOSPADM

## 2022-08-03 RX ORDER — INSULIN LISPRO 100 [IU]/ML
0-8 INJECTION, SOLUTION INTRAVENOUS; SUBCUTANEOUS
Status: DISCONTINUED | OUTPATIENT
Start: 2022-08-03 | End: 2022-08-04 | Stop reason: HOSPADM

## 2022-08-03 RX ORDER — SODIUM CHLORIDE 9 MG/ML
INJECTION, SOLUTION INTRAVENOUS PRN
Status: DISCONTINUED | OUTPATIENT
Start: 2022-08-03 | End: 2022-08-04 | Stop reason: HOSPADM

## 2022-08-03 RX ORDER — INSULIN GLARGINE 100 [IU]/ML
25 INJECTION, SOLUTION SUBCUTANEOUS NIGHTLY
Status: DISCONTINUED | OUTPATIENT
Start: 2022-08-03 | End: 2022-08-04 | Stop reason: HOSPADM

## 2022-08-03 RX ORDER — METOPROLOL SUCCINATE 25 MG/1
25 TABLET, EXTENDED RELEASE ORAL DAILY
Status: DISCONTINUED | OUTPATIENT
Start: 2022-08-03 | End: 2022-08-04 | Stop reason: HOSPADM

## 2022-08-03 RX ORDER — ASPIRIN 81 MG/1
81 TABLET, CHEWABLE ORAL DAILY
Status: DISCONTINUED | OUTPATIENT
Start: 2022-08-03 | End: 2022-08-04 | Stop reason: HOSPADM

## 2022-08-03 RX ORDER — INSULIN LISPRO 100 [IU]/ML
5 INJECTION, SOLUTION INTRAVENOUS; SUBCUTANEOUS
Status: DISCONTINUED | OUTPATIENT
Start: 2022-08-03 | End: 2022-08-04 | Stop reason: HOSPADM

## 2022-08-03 RX ORDER — GEMFIBROZIL 600 MG/1
600 TABLET, FILM COATED ORAL
Status: DISCONTINUED | OUTPATIENT
Start: 2022-08-03 | End: 2022-08-04 | Stop reason: HOSPADM

## 2022-08-03 RX ORDER — CALCIUM CARBONATE 200(500)MG
500 TABLET,CHEWABLE ORAL 3 TIMES DAILY PRN
Status: DISCONTINUED | OUTPATIENT
Start: 2022-08-03 | End: 2022-08-04 | Stop reason: HOSPADM

## 2022-08-03 RX ORDER — PANTOPRAZOLE SODIUM 40 MG/1
40 TABLET, DELAYED RELEASE ORAL DAILY
Status: DISCONTINUED | OUTPATIENT
Start: 2022-08-03 | End: 2022-08-04 | Stop reason: HOSPADM

## 2022-08-03 RX ORDER — POLYETHYLENE GLYCOL 3350 17 G/17G
17 POWDER, FOR SOLUTION ORAL DAILY PRN
Status: DISCONTINUED | OUTPATIENT
Start: 2022-08-03 | End: 2022-08-04 | Stop reason: HOSPADM

## 2022-08-03 RX ORDER — ONDANSETRON 4 MG/1
4 TABLET, ORALLY DISINTEGRATING ORAL EVERY 8 HOURS PRN
Status: DISCONTINUED | OUTPATIENT
Start: 2022-08-03 | End: 2022-08-04 | Stop reason: HOSPADM

## 2022-08-03 RX ADMIN — ATORVASTATIN CALCIUM 80 MG: 80 TABLET, FILM COATED ORAL at 20:59

## 2022-08-03 RX ADMIN — METOPROLOL SUCCINATE 25 MG: 25 TABLET, FILM COATED, EXTENDED RELEASE ORAL at 11:13

## 2022-08-03 RX ADMIN — ANTACID TABLETS 500 MG: 500 TABLET, CHEWABLE ORAL at 21:18

## 2022-08-03 RX ADMIN — SODIUM CHLORIDE, PRESERVATIVE FREE 10 ML: 5 INJECTION INTRAVENOUS at 13:49

## 2022-08-03 RX ADMIN — ASPIRIN 81 MG: 81 TABLET, CHEWABLE ORAL at 11:13

## 2022-08-03 RX ADMIN — GEMFIBROZIL 600 MG: 600 TABLET ORAL at 17:10

## 2022-08-03 RX ADMIN — FUROSEMIDE 20 MG: 10 INJECTION, SOLUTION INTRAMUSCULAR; INTRAVENOUS at 11:22

## 2022-08-03 RX ADMIN — INSULIN LISPRO 5 UNITS: 100 INJECTION, SOLUTION INTRAVENOUS; SUBCUTANEOUS at 17:11

## 2022-08-03 RX ADMIN — SODIUM CHLORIDE, PRESERVATIVE FREE 10 ML: 5 INJECTION INTRAVENOUS at 21:00

## 2022-08-03 RX ADMIN — GEMFIBROZIL 600 MG: 600 TABLET ORAL at 11:13

## 2022-08-03 RX ADMIN — PANTOPRAZOLE SODIUM 40 MG: 40 TABLET, DELAYED RELEASE ORAL at 11:13

## 2022-08-03 RX ADMIN — INSULIN GLARGINE 25 UNITS: 100 INJECTION, SOLUTION SUBCUTANEOUS at 20:57

## 2022-08-03 RX ADMIN — ENOXAPARIN SODIUM 90 MG: 100 INJECTION SUBCUTANEOUS at 11:13

## 2022-08-03 RX ADMIN — TETRAKIS(2-METHOXYISOBUTYLISOCYANIDE)COPPER(I) TETRAFLUOROBORATE 31 MILLICURIE: 1 INJECTION, POWDER, LYOPHILIZED, FOR SOLUTION INTRAVENOUS at 13:49

## 2022-08-03 RX ADMIN — ENOXAPARIN SODIUM 90 MG: 100 INJECTION SUBCUTANEOUS at 20:56

## 2022-08-03 ASSESSMENT — PAIN - FUNCTIONAL ASSESSMENT
PAIN_FUNCTIONAL_ASSESSMENT: NONE - DENIES PAIN
PAIN_FUNCTIONAL_ASSESSMENT: NONE - DENIES PAIN

## 2022-08-03 ASSESSMENT — ENCOUNTER SYMPTOMS
CONSTIPATION: 0
NAUSEA: 0
DIARRHEA: 0
BLOOD IN STOOL: 0
SHORTNESS OF BREATH: 1
WHEEZING: 0
COUGH: 1
STRIDOR: 0
VOMITING: 0
ABDOMINAL PAIN: 0

## 2022-08-03 NOTE — PROGRESS NOTES
[facility] Therapy Not Seen Note    DATE: 8/3/2022    NAME: Alivia Olson  MRN: [id number]   : 1945      Patient not seen this date for Occupational Therapy due to:    Patient is not appropriate for active participation in OT evaluation/treatment at this time d/t elevated troponins, awaiting cardiology POC and possible cardiac cath.        Electronically signed by Cher Anderson OT on 8/3/2022 at 5:55 PM

## 2022-08-03 NOTE — ED NOTES
Writer called [email or web address] for ETA.   Original ETA was 3 am. Anjali Bonds at [email or web address] states attempting to transport before      7 am.       Lynn Lopez RN  08/03/22 2084

## 2022-08-03 NOTE — CONSULTS
[facility] Cardiology Cardiology    Consult / H&P               Today's Date: 8/3/2022  Patient Name: Haroon Velazquez  Date of admission: 8/2/2022  3:29 PM  Patient's age: 69 y.o., 1945  Admission Dx: Elevated troponin [R77.8]  Dyspnea, unspecified type [R06.00]    Reason for Consult:  Cardiac evaluation    Requesting Physician: James Fairchild MD    CHIEF COMPLAINT:  Shortness of breath    History Obtained From:  patient, electronic medical record    HISTORY OF PRESENT ILLNESS:      The patient is a 69 y.o. male who initially presented to [facility] ER with dyspnea and an abnormal lab. Patient has been having worsening dyspnea over the past month and her doctor ordered an outpatient troponin which came back elevated and was referred to the ER. Patient was subsequently transferred to [facility] for cardiac evaluation. She does not have any previous cardiac ischemic work-up on file however was supposed to get a stress test as outpatient. Past Medical History:   has a past medical history of COVID, COVID, Diabetes mellitus ([address] and Seasonal allergies. Past Surgical History:   has a past surgical history that includes Partial hysterectomy (Right); Appendectomy; Gallbladder surgery; Knee Arthroplasty; Hand surgery (N/A); and sinus surgery (N/A). Home Medications:    Prior to Admission medications    Medication Sig Start Date End Date Taking? Authorizing Provider   simethicone (MYLICON) 80 MG chewable tablet Take 1 tablet by mouth 4 times daily as needed for Flatulence 7/29/22   MAYRA Braxton   empagliflozin (JARDIANCE) 10 MG tablet Take 1 tablet by mouth in the morning. 7/29/22   MAYRA Braxton   doxycycline hyclate (VIBRA-TABS) 100 MG tablet Take 1 tablet by mouth in the morning and 1 tablet before bedtime. Do all this for 10 days.  7/29/22 8/8/22  MAYRA Braxton   gemfibrozil (LOPID) 600 MG tablet TAKE 1 TABLET BY MOUTH TWO TIMES A DAY 7/13/22   MAYRA Braxton   glipiZIDE (GLUCOTROL) 10 MG tablet TAKE 1 TABLET BY MOUTH TWO TIMES A DAY before meals 6/28/22   MAYRA Leone   metFORMIN (GLUCOPHAGE-XR) 500 MG extended release tablet TAKE 1 TABLET BY MOUTH EVERY DAY WITH BREAKFAST 5/16/22   Reji Mccracken PA-C   Blood Glucose Monitoring Suppl ([address]) w/Device KIT USE AS DIRECTED TO TEST THREE TIMES DAILY 1/29/22   Historical Provider, MD   pantoprazole (PROTONIX) 40 MG tablet Take 1 tablet by mouth daily 4/5/22   MAYRA Leone   insulin 70-30 (NOVOLIN 70/30) (70-30) 100 UNIT per ML injection vial Inject 45 Units into the skin 2 times daily 1/24/22   MAYRA Leone   blood glucose monitor strips Test 3 times a day & as needed for symptoms of irregular blood glucose. Dispense sufficient amount for indicated testing frequency plus additional to accommodate PRN testing needs. 1/24/22   MAYRA Leone   Insulin Syringe-Needle U-100 (B-D INS SYR ULTRAFINE .5CC/30G) 30G X 1/2\" 0.5 ML MISC 1/2cc/ml syringe with 32g x 8mm needle.   May use any brand  E11.9 1/24/22   MAYRA Leone   meclizine (ANTIVERT) 25 MG CHEW Take 1 tablet by mouth 3 times daily as needed (dizziness) 1/24/22   MAYRA Leone      Current Facility-Administered Medications: gemfibrozil (LOPID) tablet 600 mg, 600 mg, Oral, BID AC  Insulin NPH Isophane & Regular (HUMULIN;NOVOLIN) (70-30) 100 UNIT per ML injection pen 45 Units, 45 Units, SubCUTAneous, BID AC  pantoprazole (PROTONIX) tablet 40 mg, 40 mg, Oral, Daily  insulin lispro (HUMALOG) injection vial 0-8 Units, 0-8 Units, SubCUTAneous, TID WC  insulin lispro (HUMALOG) injection vial 0-4 Units, 0-4 Units, SubCUTAneous, Nightly  glucose chewable tablet 16 g, 4 tablet, Oral, PRN  dextrose bolus 10% 125 mL, 125 mL, IntraVENous, PRN **OR** dextrose bolus 10% 250 mL, 250 mL, IntraVENous, PRN  glucagon (rDNA) injection 1 mg, 1 mg, IntraMUSCular, PRN  dextrose 10 % infusion, , IntraVENous, Continuous PRN  sodium chloride flush 0.9 % injection 5-40 mL, 5-40 mL, IntraVENous, 2 times per day  sodium chloride flush 0.9 % injection 10 mL, 10 mL, IntraVENous, PRN  0.9 % sodium chloride infusion, , IntraVENous, PRN  potassium chloride (KLOR-CON M) extended release tablet 40 mEq, 40 mEq, Oral, PRN **OR** potassium bicarb-citric acid (EFFER-K) effervescent tablet 40 mEq, 40 mEq, Oral, PRN **OR** potassium chloride 10 mEq/100 mL IVPB (Peripheral Line), 10 mEq, IntraVENous, PRN  magnesium sulfate 1000 mg in dextrose 5% 100 mL IVPB, 1,000 mg, IntraVENous, PRN  ondansetron (ZOFRAN-ODT) disintegrating tablet 4 mg, 4 mg, Oral, Q8H PRN **OR** ondansetron (ZOFRAN) injection 4 mg, 4 mg, IntraVENous, Q6H PRN  polyethylene glycol (GLYCOLAX) packet 17 g, 17 g, Oral, Daily PRN  acetaminophen (TYLENOL) tablet 650 mg, 650 mg, Oral, Q6H PRN **OR** acetaminophen (TYLENOL) suppository 650 mg, 650 mg, Rectal, Q6H PRN  enoxaparin (LOVENOX) injection 90 mg, 1 mg/kg, SubCUTAneous, BID  Facility-Administered Medications Ordered in Other Encounters: sodium chloride flush 0.9 % injection 10 mL, 10 mL, IntraVENous, PRN  0.9 % sodium chloride bolus, 80 mL, IntraVENous, Once    Allergies:  Aspirin, Influenza vaccines, Penicillins, Pneumococcal vaccine, and Seasonal    Social History:   reports that she has never smoked. She has never used smokeless tobacco. She reports that she does not drink alcohol and does not use drugs. Family History: family history includes Cancer in her brother, mother, and sister; Heart Disease in her father; Kidney Disease in her mother; Other in her mother; Stroke in her father. REVIEW OF SYSTEMS:    Constitutional: there has been no unanticipated weight loss. There's been No change in energy level, No change in activity level. Eyes: No visual changes or diplopia. No scleral icterus.   ENT: No Headaches  Cardiovascular:As above, shortness of breath  Respiratory: No previous pulmonary problems, No cough  Gastrointestinal: No abdominal pain.  No change in bowel or bladder habits. Genitourinary: No dysuria, trouble voiding, or hematuria. Musculoskeletal:  No gait disturbance, No weakness or joint complaints. Integumentary: No rash or pruritis. Neurological: No headache, diplopia, change in muscle strength, numbness or tingling. No change in gait, balance, coordination, mood, affect, memory, mentation, behavior. Psychiatric: No anxiety, or depression. Endocrine: No temperature intolerance. No excessive thirst, fluid intake, or urination. No tremor. Hematologic/Lymphatic: No abnormal bruising or bleeding, blood clots or swollen lymph nodes. Allergic/Immunologic: No nasal congestion or hives. PHYSICAL EXAM:      /74   Pulse 56   Temp 97.7 °F (36.5 °C) (Oral)   Resp 21   Ht 5' 6\" (1.676 m)   Wt 191 lb (86.6 kg)   SpO2 95%   BMI 30.83 kg/m²    Constitutional and General Appearance: alert, cooperative, no distress and appears stated age  HEENT: PERRL, no cervical lymphadenopathy. No masses palpable. Normal oral mucosa  Respiratory:  Normal excursion and expansion without use of accessory muscles  Resp Auscultation: Good respiratory effort. No for increased work of breathing. On auscultation: clear to auscultation bilaterally  Cardiovascular: The apical impulse is not displaced  Heart tones are crisp and normal. regular S1 and S2.  Jugular venous pulsation Normal  The carotid upstroke is normal in amplitude and contour without delay or bruit  Peripheral pulses are symmetrical and full   Abdomen:   No masses or tenderness  Bowel sounds present  Extremities:   No Cyanosis or Clubbing   Lower extremity edema: No   Skin: Warm and dry  Neurological:  Alert and oriented. Moves all extremities well  No abnormalities of mood, affect, memory, mentation, or behavior are noted    .     Labs:     CBC:   Recent Labs     08/02/22  1604   WBC 6.2   HGB 14.0   HCT 40.9        BMP:   Recent Labs     08/02/22  1423 08/02/22  1604   NA  -- 137   K  --  3.8   CO2  --  27   BUN  --  14   CREATININE 0.84 0.78   LABGLOM >60 >60   GLUCOSE  --  57*     BNP: No results for input(s): BNP in the last 72 hours. PT/INR: No results for input(s): PROTIME, INR in the last 72 hours. APTT:No results for input(s): APTT in the last 72 hours. CARDIAC ENZYMES:No results for input(s): CKTOTAL, CKMB, CKMBINDEX, TROPONINI in the last 72 hours. FASTING LIPID PANEL:  Lab Results   Component Value Date/Time    HDL 34 07/18/2022 11:16 AM     LIVER PROFILE:No results for input(s): AST, ALT, LABALBU in the last 72 hours. DATA:    Diagnostics:    EKG: No acute ischemic changes. ECHO: Ordered. IMPRESSION:    Worsening dyspnea over the past 1 month with CT PE showing diffuse pulmonary parenchymal congestion. NSTEMI likely type 2. Down trending troponin. Type 2 diabetes. Obstructive sleep apnea. RECOMMENDATIONS:  Will continue aspirin, statin and beta-blocker daily. Will plan for stress test in the am as patient ate today. Continue to follow after stress test.      Discussed with patient and Nurse. Tyronne Cooks, MD       Cardiovascular Fellow  8/3/2022, 8:27 AM      I reviewed the patient history personally, and examined by me during the visit. I have reviewed the H&P/Consult / Progress note as completed, and made appropriate changes to the patient exam and treatment plans.       I have reviewed the case in details including physical exam and treatment plan with resident / fellow / NP    Patient treatment plan was explained to patient, correction in notes was made as appropriate, and discussed final arrangement based on  my evaluation and exam.    Additional Recommendations:      Shailesh Shah MD  [facility]

## 2022-08-03 NOTE — CARE COORDINATION
08/03/22 1220   Service Assessment   Patient Orientation Alert and Oriented   History Provided By Patient   Support Systems Family Members   PCP Verified by CM Yes  Cirilo NUNEZ)   Last Visit to PCP Within last 3 months   Prior Functional Level Independent in ADLs/IADLs   Can patient return to prior living arrangement Yes   Family able to assist with home care needs: Yes   Social/Functional History   Lives With Family   Type of [address] level   Ambulation Assistance Independent   Discharge Planning   Type of Residence House   Living Arrangements Family Members   Potential Assistance Purchasing Medications No   Meds-to-Beds: Does the patient want to have any new prescriptions delivered to bedside prior to discharge?  Yes   Patient expects to be discharged to: [facility]   Confirm Follow Up Transport Family       Plan home with family independent with ADL's

## 2022-08-03 NOTE — H&P
test scheduled on August 9th. )   and is admitted to the hospital for the management of Elevated troponin. This is a pleasant 71-year-old female who presented to [facility] ER for abnormal labs, patient has been having dyspnea on exertion over the last 1 month, usually have chronic sinus issues and was recently on antibiotics, she reports coug and phlegm but felt it was related to her sinus issue? .  During PCP visit CT chest and troponins were ordered, tropes were found to be elevated and CT chest with no PE but was bilateral groundglass opacities. During ER evaluation patient second set of tropes were going up, there was a plan for stress test as an outpatient ,cardiology did request patient to be transferred to our facility for further cardiac work-up. Her D-dimer was found to be 3. Patient is now very active at baseline as she does not go out much, her average A1c is around 8. She has history of smoking but stopped more than 20 years ago  Currently She denies any chest pain, sob, nausea, vomiting, fever or chills    Past Medical History:     Past Medical History:   Diagnosis Date    COVID 1/8/2022    COVID 1/8/2022    Diabetes mellitus [address] [address]     Seasonal allergies         Past Surgical History:     Past Surgical History:   Procedure Laterality Date    APPENDECTOMY      GALLBLADDER SURGERY      HAND SURGERY N/A     Both hands and right twice     KNEE ARTHROPLASTY      PARTIAL HYSTERECTOMY (CERVIX NOT REMOVED) Right     Age 33. 1 ovary and F. tube    SINUS SURGERY N/A     Two        Medications Prior to Admission:     Prior to Admission medications    Medication Sig Start Date End Date Taking?  Authorizing Provider   simethicone (MYLICON) 80 MG chewable tablet Take 1 tablet by mouth 4 times daily as needed for Flatulence 7/29/22   Valeta Standard, PA   empagliflozin (JARDIANCE) 10 MG tablet Take 1 tablet by mouth in the morning. 7/29/22   Valeta Standard, PA   doxycycline hyclate (VIBRA-TABS) 100 MG tablet Take 1 tablet by mouth in the morning and 1 tablet before bedtime. Do all this for 10 days. 7/29/22 8/8/22  MAYRA Lucas   gemfibrozil (LOPID) 600 MG tablet TAKE 1 TABLET BY MOUTH TWO TIMES A DAY 7/13/22   MAYRA Lucas   glipiZIDE (GLUCOTROL) 10 MG tablet TAKE 1 TABLET BY MOUTH TWO TIMES A DAY before meals 6/28/22   MAYRA Lucas   metFORMIN (GLUCOPHAGE-XR) 500 MG extended release tablet TAKE 1 TABLET BY MOUTH EVERY DAY WITH BREAKFAST 5/16/22   Marcel Rosario PA-C   Blood Glucose Monitoring Suppl ([address]) w/Device KIT USE AS DIRECTED TO TEST THREE TIMES DAILY 1/29/22   Historical Provider, MD   pantoprazole (PROTONIX) 40 MG tablet Take 1 tablet by mouth daily 4/5/22   MAYRA Lucas   insulin 70-30 (NOVOLIN 70/30) (70-30) 100 UNIT per ML injection vial Inject 45 Units into the skin 2 times daily 1/24/22   MAYRA Lucas   blood glucose monitor strips Test 3 times a day & as needed for symptoms of irregular blood glucose. Dispense sufficient amount for indicated testing frequency plus additional to accommodate PRN testing needs. 1/24/22   MAYRA Lucas   Insulin Syringe-Needle U-100 (B-D INS SYR ULTRAFINE .5CC/30G) 30G X 1/2\" 0.5 ML MISC 1/2cc/ml syringe with 32g x 8mm needle. May use any brand  E11.9 1/24/22   MAYRA Lucas   meclizine (ANTIVERT) 25 MG CHEW Take 1 tablet by mouth 3 times daily as needed (dizziness) 1/24/22   MAYRA Lucas        Allergies:     Aspirin, Influenza vaccines, Penicillins, Pneumococcal vaccine, and Seasonal    Social History:     Tobacco:    reports that she has never smoked. She has never used smokeless tobacco.  Alcohol:      reports no history of alcohol use. Drug Use:  reports no history of drug use.     Family History:     Family History   Problem Relation Age of Onset    Kidney Disease Mother     Other Mother     Cancer Mother     Heart Disease Father     Stroke Father     Cancer Sister    Nissa Iqbal Neck:      Thyroid: No thyromegaly. Vascular: No carotid bruit. Trachea: Trachea and phonation normal. No tracheal deviation. Cardiovascular:      Rate and Rhythm: Normal rate and regular rhythm. Pulses: Normal pulses. Heart sounds: Normal heart sounds. No murmur heard. Pulmonary:      Effort: Pulmonary effort is normal. No respiratory distress. Breath sounds: Normal breath sounds. No stridor. No decreased breath sounds. Abdominal:      General: Bowel sounds are normal. There is no distension. Palpations: Abdomen is soft. There is no mass. Tenderness: There is no abdominal tenderness. There is no guarding. Musculoskeletal:         General: No tenderness. Cervical back: Neck supple. Skin:     General: Skin is warm and dry. Findings: No erythema, lesion or rash. Neurological:      Mental Status: She is alert and oriented to person, place, and time. She is not disoriented. Cranial Nerves: No cranial nerve deficit. Psychiatric:         Speech: Speech normal.         Behavior: Behavior normal. Behavior is cooperative.        Investigations:      Laboratory Testing:  Recent Results (from the past 24 hour(s))   Brain Natriuretic Peptide    Collection Time: 08/02/22  2:16 PM   Result Value Ref Range    Pro- <300 pg/mL   Troponin    Collection Time: 08/02/22  2:16 PM   Result Value Ref Range    Troponin, High Sensitivity 130 (HH) 0 - 14 ng/L   Creatinine    Collection Time: 08/02/22  2:23 PM   Result Value Ref Range    Creatinine 0.84 0.50 - 0.90 mg/dL    GFR Non-African American >60 >60 mL/min    GFR African American >60 >60 mL/min    GFR Comment         EKG 12 Lead    Collection Time: 08/02/22  3:38 PM   Result Value Ref Range    Ventricular Rate 71 BPM    Atrial Rate 71 BPM    P-R Interval 132 ms    QRS Duration 88 ms    Q-T Interval 352 ms    QTc Calculation (Bazett) 382 ms    P Axis 22 degrees    R Axis -36 degrees    T Axis 54 degrees   CBC with Auto Differential    Collection Time: 08/02/22  4:04 PM   Result Value Ref Range    WBC 6.2 3.5 - 11.0 k/uL    RBC 4.38 4.0 - 5.2 m/uL    Hemoglobin 14.0 12.0 - 16.0 g/dL    Hematocrit 40.9 36 - 46 %    MCV 93.4 80 - 100 fL    MCH 31.9 26 - 34 pg    MCHC 34.2 31 - 37 g/dL    RDW 13.0 12.5 - 15.4 %    Platelets 330 976 - 099 k/uL    MPV 7.1 6.0 - 12.0 fL    Seg Neutrophils 33 (L) 36 - 66 %    Lymphocytes 55 (H) 24 - 44 %    Monocytes 8 2 - 11 %    Eosinophils % 3 1 - 4 %    Basophils 1 0 - 2 %    Segs Absolute 2.10 1.8 - 7.7 k/uL    Absolute Lymph # 3.40 1.0 - 4.8 k/uL    Absolute Mono # 0.50 0.1 - 1.2 k/uL    Absolute Eos # 0.20 0.0 - 0.4 k/uL    Basophils Absolute 0.00 0.0 - 0.2 k/uL   Basic Metabolic Panel    Collection Time: 08/02/22  4:04 PM   Result Value Ref Range    Glucose 57 (L) 70 - 99 mg/dL    BUN 14 8 - 23 mg/dL    Creatinine 0.78 0.50 - 0.90 mg/dL    Calcium 9.5 8.6 - 10.4 mg/dL    Sodium 137 135 - 144 mmol/L    Potassium 3.8 3.7 - 5.3 mmol/L    Chloride 100 98 - 107 mmol/L    CO2 27 20 - 31 mmol/L    Anion Gap 10 9 - 17 mmol/L    GFR Non-African American >60 >60 mL/min    GFR African American >60 >60 mL/min    GFR Comment         D-Dimer, Quantitative    Collection Time: 08/02/22  4:04 PM   Result Value Ref Range    D-Dimer, Quant 0.66 mg/L FEU   Troponin    Collection Time: 08/02/22  4:04 PM   Result Value Ref Range    Troponin, High Sensitivity 154 (HH) 0 - 14 ng/L   POC Glucose Fingerstick    Collection Time: 08/02/22  4:09 PM   Result Value Ref Range    POC Glucose 55 (L) 65 - 105 mg/dL   COVID-19, Rapid    Collection Time: 08/02/22  6:10 PM    Specimen: Nasopharyngeal Swab   Result Value Ref Range    Specimen Description . NASOPHARYNGEAL SWAB     SARS-CoV-2, Rapid Not Detected Not Detected   Brain Natriuretic Peptide    Collection Time: 08/03/22  6:47 AM   Result Value Ref Range    Pro- <300 pg/mL   Troponin    Collection Time: 08/03/22  6:47 AM   Result Value Ref Range    Troponin, High Sensitivity 152 (HH) 0 - 14 ng/L       Imaging/Diagnostics:  CT ABDOMEN PELVIS W IV CONTRAST Additional Contrast? None    Result Date: 8/2/2022  Negative for acute inflammatory process or bowel obstruction. Mild-to-moderate retained stool. Colonic diverticulosis. CT CHEST PULMONARY EMBOLISM W CONTRAST    Result Date: 8/2/2022  1. No evidence of pulmonary embolism. 2.  Diffuse mild ground glass parenchymal appearance with hazy bordered vessels without overt edema, appearance favoring pulmonary venous congestion. No localized consolidation or suspicious nodules.        Assessment :      Hospital Problems             Last Modified POA    * (Principal) Elevated troponin 8/2/2022 Yes    Type 2 diabetes mellitus with hyperglycemia, with long-term current use of insulin ([facility] [address] 8/3/2022 Yes    Diabetic polyneuropathy associated with type 2 diabetes mellitus ([facility] [address] 8/3/2022 Yes    Ground glass opacity present on imaging of lung 8/3/2022 Yes       Plan:     Patient status inpatient in the Progressive Unit/Step down       Elevated Troponin : patient denies any chest pain , she has dyspnea on exertion , her BNP is low,  Echo ordered    On ASA, statin, BB started  At this point no need to repeat trops  Cardiology is consulted       Elevated D dimer : PE ruled out, will get LE doppler      Bilateral ground glass infiltrates : they favor vascular congestion , no S/S of PNA, will still check vital panel and mycoplasma    HPL: continue home medications, get lipid panel    DM2: Presented with hypoglycemia, will hold on her diabetes home medications for now, continue low intensity insulin sliding scale, hypoglycemia protocol    DVT prophylaxis      Consultations:   IP CONSULT TO CARDIOLOGY  IP CONSULT TO IV TEAM     Patient is admitted as inpatient status because of co-morbidities listed above, severity of signs and symptoms as outlined, requirement for current medical therapies and most importantly because of direct risk to patient if care not provided in a hospital setting. Expected length of stay > 48 hours.     Tanja Carranza MD  8/3/2022  8:29 AM    Copy sent to Dr. Mealnia Rush PA

## 2022-08-04 VITALS
SYSTOLIC BLOOD PRESSURE: 127 MMHG | HEART RATE: 65 BPM | BODY MASS INDEX: 30.7 KG/M2 | WEIGHT: 191 LBS | HEIGHT: 66 IN | TEMPERATURE: 97.7 F | OXYGEN SATURATION: 95 % | DIASTOLIC BLOOD PRESSURE: 56 MMHG | RESPIRATION RATE: 18 BRPM

## 2022-08-04 PROBLEM — R09.02 HYPOXIA: Status: ACTIVE | Noted: 2022-08-04

## 2022-08-04 PROBLEM — Z86.16 HISTORY OF COVID-19: Status: ACTIVE | Noted: 2022-08-04

## 2022-08-04 PROBLEM — R06.00 DYSPNEA: Status: ACTIVE | Noted: 2022-01-01

## 2022-08-04 LAB
ANION GAP SERPL CALCULATED.3IONS-SCNC: 9 MMOL/L (ref 9–17)
BUN BLDV-MCNC: 16 MG/DL (ref 8–23)
CALCIUM SERPL-MCNC: 9.3 MG/DL (ref 8.6–10.4)
CHLORIDE BLD-SCNC: 105 MMOL/L (ref 98–107)
CO2: 25 MMOL/L (ref 20–31)
CREAT SERPL-MCNC: 0.79 MG/DL (ref 0.5–0.9)
GFR AFRICAN AMERICAN: >60 ML/MIN
GFR NON-AFRICAN AMERICAN: >60 ML/MIN
GFR SERPL CREATININE-BSD FRML MDRD: ABNORMAL ML/MIN/{1.73_M2}
GLUCOSE BLD-MCNC: 131 MG/DL (ref 70–99)
GLUCOSE BLD-MCNC: 136 MG/DL (ref 65–105)
GLUCOSE BLD-MCNC: 171 MG/DL (ref 65–105)
GLUCOSE BLD-MCNC: 181 MG/DL (ref 65–105)
INR BLD: 1
LV EF: 55 %
LV EF: 76 %
LVEF MODALITY: NORMAL
LVEF MODALITY: NORMAL
POTASSIUM SERPL-SCNC: 3.8 MMOL/L (ref 3.7–5.3)
PROTHROMBIN TIME: 11.1 SEC (ref 9.1–12.3)
SODIUM BLD-SCNC: 139 MMOL/L (ref 135–144)

## 2022-08-04 PROCEDURE — 85610 PROTHROMBIN TIME: CPT

## 2022-08-04 PROCEDURE — A9500 TC99M SESTAMIBI: HCPCS | Performed by: INTERNAL MEDICINE

## 2022-08-04 PROCEDURE — 82947 ASSAY GLUCOSE BLOOD QUANT: CPT

## 2022-08-04 PROCEDURE — 6370000000 HC RX 637 (ALT 250 FOR IP): Performed by: STUDENT IN AN ORGANIZED HEALTH CARE EDUCATION/TRAINING PROGRAM

## 2022-08-04 PROCEDURE — 93306 TTE W/DOPPLER COMPLETE: CPT

## 2022-08-04 PROCEDURE — 6370000000 HC RX 637 (ALT 250 FOR IP): Performed by: INTERNAL MEDICINE

## 2022-08-04 PROCEDURE — 6370000000 HC RX 637 (ALT 250 FOR IP): Performed by: FAMILY MEDICINE

## 2022-08-04 PROCEDURE — 6370000000 HC RX 637 (ALT 250 FOR IP): Performed by: NURSE PRACTITIONER

## 2022-08-04 PROCEDURE — 99239 HOSP IP/OBS DSCHRG MGMT >30: CPT | Performed by: INTERNAL MEDICINE

## 2022-08-04 PROCEDURE — 80048 BASIC METABOLIC PNL TOTAL CA: CPT

## 2022-08-04 PROCEDURE — 6360000002 HC RX W HCPCS: Performed by: INTERNAL MEDICINE

## 2022-08-04 PROCEDURE — 2580000003 HC RX 258: Performed by: NURSE PRACTITIONER

## 2022-08-04 PROCEDURE — 2580000003 HC RX 258: Performed by: INTERNAL MEDICINE

## 2022-08-04 PROCEDURE — 93017 CV STRESS TEST TRACING ONLY: CPT | Performed by: NURSE PRACTITIONER

## 2022-08-04 PROCEDURE — 6360000002 HC RX W HCPCS: Performed by: NURSE PRACTITIONER

## 2022-08-04 PROCEDURE — 36415 COLL VENOUS BLD VENIPUNCTURE: CPT

## 2022-08-04 PROCEDURE — 3430000000 HC RX DIAGNOSTIC RADIOPHARMACEUTICAL: Performed by: INTERNAL MEDICINE

## 2022-08-04 RX ORDER — SODIUM CHLORIDE 0.9 % (FLUSH) 0.9 %
10 SYRINGE (ML) INJECTION PRN
Status: DISCONTINUED | OUTPATIENT
Start: 2022-08-04 | End: 2022-08-04 | Stop reason: HOSPADM

## 2022-08-04 RX ORDER — PANTOPRAZOLE SODIUM 40 MG/1
40 TABLET, DELAYED RELEASE ORAL 2 TIMES DAILY
Qty: 90 TABLET | Refills: 2 | Status: SHIPPED | OUTPATIENT
Start: 2022-08-04 | End: 2022-10-31 | Stop reason: SDUPTHER

## 2022-08-04 RX ORDER — SODIUM CHLORIDE 0.9 % (FLUSH) 0.9 %
5-40 SYRINGE (ML) INJECTION PRN
Status: DISCONTINUED | OUTPATIENT
Start: 2022-08-04 | End: 2022-08-04

## 2022-08-04 RX ORDER — SODIUM CHLORIDE 9 MG/ML
500 INJECTION, SOLUTION INTRAVENOUS CONTINUOUS PRN
Status: DISCONTINUED | OUTPATIENT
Start: 2022-08-04 | End: 2022-08-04

## 2022-08-04 RX ORDER — ATROPINE SULFATE 0.1 MG/ML
0.5 INJECTION INTRAVENOUS EVERY 5 MIN PRN
Status: DISCONTINUED | OUTPATIENT
Start: 2022-08-04 | End: 2022-08-04

## 2022-08-04 RX ORDER — METOPROLOL TARTRATE 5 MG/5ML
5 INJECTION INTRAVENOUS EVERY 5 MIN PRN
Status: DISCONTINUED | OUTPATIENT
Start: 2022-08-04 | End: 2022-08-04

## 2022-08-04 RX ORDER — ALBUTEROL SULFATE 90 UG/1
2 AEROSOL, METERED RESPIRATORY (INHALATION) PRN
Status: DISCONTINUED | OUTPATIENT
Start: 2022-08-04 | End: 2022-08-04

## 2022-08-04 RX ORDER — NITROGLYCERIN 0.4 MG/1
0.4 TABLET SUBLINGUAL EVERY 5 MIN PRN
Status: DISCONTINUED | OUTPATIENT
Start: 2022-08-04 | End: 2022-08-04

## 2022-08-04 RX ORDER — DOXYCYCLINE HYCLATE 100 MG
100 TABLET ORAL EVERY 12 HOURS SCHEDULED
Status: DISCONTINUED | OUTPATIENT
Start: 2022-08-04 | End: 2022-08-04 | Stop reason: HOSPADM

## 2022-08-04 RX ORDER — AMINOPHYLLINE DIHYDRATE 25 MG/ML
50 INJECTION, SOLUTION INTRAVENOUS PRN
Status: DISCONTINUED | OUTPATIENT
Start: 2022-08-04 | End: 2022-08-04

## 2022-08-04 RX ADMIN — DOXYCYCLINE HYCLATE 100 MG: 100 TABLET, COATED ORAL at 12:27

## 2022-08-04 RX ADMIN — SODIUM CHLORIDE, PRESERVATIVE FREE 10 ML: 5 INJECTION INTRAVENOUS at 10:28

## 2022-08-04 RX ADMIN — SODIUM CHLORIDE, PRESERVATIVE FREE 10 ML: 5 INJECTION INTRAVENOUS at 07:58

## 2022-08-04 RX ADMIN — ASPIRIN 81 MG: 81 TABLET, CHEWABLE ORAL at 07:57

## 2022-08-04 RX ADMIN — SODIUM CHLORIDE, PRESERVATIVE FREE 10 ML: 5 INJECTION INTRAVENOUS at 10:27

## 2022-08-04 RX ADMIN — PANTOPRAZOLE SODIUM 40 MG: 40 TABLET, DELAYED RELEASE ORAL at 07:57

## 2022-08-04 RX ADMIN — TETRAKIS(2-METHOXYISOBUTYLISOCYANIDE)COPPER(I) TETRAFLUOROBORATE 34.6 MILLICURIE: 1 INJECTION, POWDER, LYOPHILIZED, FOR SOLUTION INTRAVENOUS at 10:28

## 2022-08-04 RX ADMIN — REGADENOSON 0.4 MG: 0.08 INJECTION, SOLUTION INTRAVENOUS at 10:27

## 2022-08-04 RX ADMIN — GEMFIBROZIL 600 MG: 600 TABLET ORAL at 17:37

## 2022-08-04 RX ADMIN — ACETAMINOPHEN 650 MG: 325 TABLET ORAL at 07:57

## 2022-08-04 RX ADMIN — INSULIN LISPRO 5 UNITS: 100 INJECTION, SOLUTION INTRAVENOUS; SUBCUTANEOUS at 12:27

## 2022-08-04 RX ADMIN — METOPROLOL SUCCINATE 25 MG: 25 TABLET, FILM COATED, EXTENDED RELEASE ORAL at 08:07

## 2022-08-04 RX ADMIN — ENOXAPARIN SODIUM 90 MG: 100 INJECTION SUBCUTANEOUS at 07:58

## 2022-08-04 RX ADMIN — SODIUM CHLORIDE, PRESERVATIVE FREE 10 ML: 5 INJECTION INTRAVENOUS at 09:56

## 2022-08-04 ASSESSMENT — ENCOUNTER SYMPTOMS
SHORTNESS OF BREATH: 0
NAUSEA: 0
VOMITING: 0
ABDOMINAL PAIN: 0
COUGH: 0

## 2022-08-04 ASSESSMENT — PAIN SCALES - GENERAL
PAINLEVEL_OUTOF10: 6
PAINLEVEL_OUTOF10: 6

## 2022-08-04 ASSESSMENT — PAIN DESCRIPTION - LOCATION
LOCATION: JAW
LOCATION: JAW

## 2022-08-04 ASSESSMENT — PAIN DESCRIPTION - DESCRIPTORS
DESCRIPTORS: ACHING
DESCRIPTORS: ACHING

## 2022-08-04 ASSESSMENT — PAIN DESCRIPTION - ORIENTATION
ORIENTATION: LOWER
ORIENTATION: LOWER

## 2022-08-04 NOTE — PLAN OF CARE
[facility]  Office: [address], DO, Hattie Penget, DO, Akosua Jj, DO, Cirilo Antoinette Bazzi, DO, Kehinde Vega MD, Emerita Epperson MD, Hilda Drake MD, Yon Escamilla MD,  Allison Beltran MD, Chauncey Jarvis MD, Ariel Jarvis, DO, Lynn Campa MD,  Kaity Townsend MD, Ayesha Dias MD, Julia May, DO, Zaira Cary MD, Alcon Brown MD, Shawn Steven MD, Swetha Chacon DO, Pb Tubbs MD, Cristina García MD, Osvaldo Choe, CNP,  Graciela Campbell, CNP, Tamanna Keene, CNP, Charles Shah, CNP, Jhonny Holloway PA-C, Yovany Morocho, DNP, Katy Hewitt, CNP, Gina An, CNP, Saman Sanchez, CNP, Opal Faust, CNP, Reji Arndt, CNP, Contreras Baptiste, CNS, Poli Lau, DNP, Ariadne Maldonado, CNP, Danielle Lawson, CNP, Willa Zheng, CNP           [address] [place] [address]    Second Visit Note  For more detailed information please refer to the progress note of the day      8/4/2022    6:11 PM    Name:   Lilliana Gonzalez  MRN:     [id number]     Acct:      [de-identified]   Room:   2015/2015-01   Day:  2  Admit Date:  8/2/2022  3:29 PM    PCP:   MAYRA Sanchez  Code Status:  Full Code    Patient has been seen  Discharge planning in progress  Patient does qualify for home oxygen    CURRENT MEDS:   doxycycline hyclate (VIBRA-TABS) tablet 100 mg, 2 times per day  sodium chloride flush 0.9 % injection 10 mL, PRN  sodium chloride flush 0.9 % injection 10 mL, PRN  gemfibrozil (LOPID) tablet 600 mg, BID AC  pantoprazole (PROTONIX) tablet 40 mg, Daily  insulin lispro (HUMALOG) injection vial 0-8 Units, TID WC  insulin lispro (HUMALOG) injection vial 0-4 Units, Nightly  glucose chewable tablet 16 g, PRN  dextrose bolus 10% 125 mL, PRN   Or  dextrose bolus 10% 250 mL, PRN  glucagon (rDNA) injection 1 mg, PRN  dextrose 10 % infusion, Continuous PRN  sodium chloride flush 0.9 % injection 5-40 mL, 2 times per day  sodium chloride flush 0.9 % injection 10 mL, PRN  0.9 % sodium chloride infusion, PRN  potassium chloride (KLOR-CON M) extended release tablet 40 mEq, PRN   Or  potassium bicarb-citric acid (EFFER-K) effervescent tablet 40 mEq, PRN   Or  potassium chloride 10 mEq/100 mL IVPB (Peripheral Line), PRN  magnesium sulfate 1000 mg in dextrose 5% 100 mL IVPB, PRN  ondansetron (ZOFRAN-ODT) disintegrating tablet 4 mg, Q8H PRN   Or  ondansetron (ZOFRAN) injection 4 mg, Q6H PRN  polyethylene glycol (GLYCOLAX) packet 17 g, Daily PRN  acetaminophen (TYLENOL) tablet 650 mg, Q6H PRN   Or  acetaminophen (TYLENOL) suppository 650 mg, Q6H PRN  enoxaparin (LOVENOX) injection 90 mg, BID  aspirin chewable tablet 81 mg, Daily  metoprolol succinate (TOPROL XL) extended release tablet 25 mg, Daily  insulin glargine (LANTUS) injection vial 25 Units, Nightly  insulin lispro (HUMALOG) injection vial 5 Units, TID WC  calcium carbonate (TUMS) chewable tablet 500 mg, TID PRN    sodium chloride flush 0.9 % injection 10 mL, PRN  0.9 % sodium chloride bolus, Once      VITALS:  BP (!) 127/56   Pulse 65   Temp 97.7 °F (36.5 °C) (Oral)   Resp 18   Ht 5' 6\" (1.676 m)   Wt 191 lb (86.6 kg)   SpO2 95%   BMI 30.83 kg/m²     LABS:  Labs:  Hematology:  Recent Labs     08/02/22  1604 08/04/22  0633   WBC 6.2  --    RBC 4.38  --    HGB 14.0  --    HCT 40.9  --    MCV 93.4  --    MCH 31.9  --    MCHC 34.2  --    RDW 13.0  --      --    MPV 7.1  --    INR  --  1.0   DDIMER 0.66  --      Chemistry:  Recent Labs     08/02/22  1416 08/02/22  1423 08/02/22  1604 08/03/22  0647 08/03/22  1033 08/04/22  0633   NA  --   --  137  --   --  139   K  --   --  3.8  --   --  3.8   CL  --   --  100  --   --  105   CO2  --   --  27  --   --  25   GLUCOSE  --   --  57*  --   --  131*   BUN  --   --  14  --   --  16   CREATININE  --  0.84 0.78  --   --  0.79 ANIONGAP  --   --  10  --   --  9   LABGLOM  --  >60 >60  --   --  >60   GFRAA  --  >60 >60  --   --  >60   CALCIUM  --   --  9.5  --   --  9.3   PROBNP 131  --   --  124  --   --    TROPHS 130*  --  154* 152* 117*  --      Recent Labs     08/03/22  0647 08/03/22  1106 08/03/22  1559 08/03/22 2022 08/04/22  0750 08/04/22  1226 08/04/22  1531   CHOL 225*  --   --   --   --   --   --    HDL 26*  --   --   --   --   --   --    LDLCHOLESTEROL 157*  --   --   --   --   --   --    CHOLHDLRATIO 8.7*  --   --   --   --   --   --    TRIG 210*  --   --   --   --   --   --    POCGLU  --  162* 168* 199* 136* 181* 171*       PROBLEMS:  Principal Problem:    Elevated troponin  Active Problems:    Ground glass opacity present on imaging of lung    Hypoglycemia    Dyspnea    Hypoxia    Type 2 diabetes mellitus with hyperglycemia, with long-term current use of insulin (HCC)    Diabetic polyneuropathy associated with type 2 diabetes mellitus (HCC)  Resolved Problems:    * No resolved hospital problems. *      UPDATED PLAN:  See current orders  Home oxygen ordered  Face to face encounter today indicate the requirement of DME order of  Oxygen for the diagnosis of the  COPD.  Indication and side effects of treatment had been addressed with pt , pt agreeable to the current plan of using the DME        The patient's status and plan have been discussed with the patient and family at the bedside     DCP 34 min+    IP CONSULT TO CARDIOLOGY  IP CONSULT  Alex Hernandez DO  8/4/2022  6:11 PM

## 2022-08-04 NOTE — PROGRESS NOTES
Occupational [address]  Occupational Therapy Not Seen Note    DATE: 2022    NAME: Aye Russell  MRN: [id number]   : 1945      Patient not seen this date for Occupational Therapy due to:    Testing: Pt off floor for stress test      Electronically signed by Tom Watts OT on 2022 at 10:16 AM

## 2022-08-04 NOTE — PROGRESS NOTES
[facility]  Progress Note                   Date:   8/4/2022  Patient name: Ulysses Mosley  Date of admission:  8/2/2022  3:29 PM  MRN:   [id number]  YOB: 1945  PCP: MAYRA June    Reason for Admission: Elevated troponin [R77.8]  Dyspnea, unspecified type [R06.00]    Subjective:       Clinical Changes /Abnormalities: Seen & examined in room. No acute CV issues/concerns overnight. Labs, vitals, & tele reviewed. Echo and stress completed this AM, results pending.;     Review of Systems    Medications:   Scheduled Meds:   doxycycline hyclate  100 mg Oral 2 times per day    gemfibrozil  600 mg Oral BID AC    pantoprazole  40 mg Oral Daily    insulin lispro  0-8 Units SubCUTAneous TID WC    insulin lispro  0-4 Units SubCUTAneous Nightly    sodium chloride flush  5-40 mL IntraVENous 2 times per day    enoxaparin  1 mg/kg SubCUTAneous BID    aspirin  81 mg Oral Daily    metoprolol succinate  25 mg Oral Daily    insulin glargine  25 Units SubCUTAneous Nightly    insulin lispro  5 Units SubCUTAneous TID WC     Continuous Infusions:   dextrose      sodium chloride       CBC:   Recent Labs     08/02/22  1604   WBC 6.2   HGB 14.0        BMP:    Recent Labs     08/02/22  1423 08/02/22  1604 08/04/22  0633   NA  --  137 139   K  --  3.8 3.8   CL  --  100 105   CO2  --  27 25   BUN  --  14 16   CREATININE 0.84 0.78 0.79   GLUCOSE  --  57* 131*     Hepatic:No results for input(s): AST, ALT, ALB, BILITOT, ALKPHOS in the last 72 hours. Troponin:   Recent Labs     08/02/22  1604 08/03/22  0647 08/03/22  1033   TROPHS 154* 152* 117*     BNP: No results for input(s): BNP in the last 72 hours.   Lipids:   Recent Labs     08/03/22  0647   CHOL 225*   HDL 26*     INR:   Recent Labs     08/04/22  0633   INR 1.0       Objective:   Vitals: BP (!) 140/54   Pulse 58   Temp 98.2 °F (36.8 °C) (Oral)   Resp 24   Ht 5' 6\" (1.676 m)   Wt 191 lb (86.6 kg)   SpO2 96%   BMI 30.83 kg/m² General appearance: alert and cooperative with exam  HEENT: Head: Normocephalic, no lesions, without obvious abnormality. Neck:no JVD, trachea midline, no adenopathy  Lungs: Clear to auscultation throughout. NC without distress  Heart: Regular rate and rhythm, s1/s2 auscultated, no murmurs, Tele SR  Abdomen: soft, non-tender, bowel sounds active, obese  Extremities: no edema  Neurologic: not done        Assessment / Acute Cardiac Problems:   SOB, rest and exertion  Trop elevation, likely Type II but cannot r/o type I  DM2  DAREK    Patient Active Problem List:     DAREK (obstructive sleep apnea)     Type 2 diabetes mellitus with hyperglycemia, with long-term current use of insulin (HCC)     Gastroesophageal reflux disease without esophagitis     Diabetic polyneuropathy associated with type 2 diabetes mellitus (HCC)     COVID     COVID-19     Elevated troponin     Ground glass opacity present on imaging of lung     Hypoglycemia      Plan of Treatment:   Stable. Await stress and echo findings for further ischemic evaluation. Discussed in detail with patient. Questions/concerns addressed.    Continue PO ASA, statin, & BB  Keep K >4 and Mg >2    Electronically signed by ANIBAL Mock CNP on 8/4/2022 at 12:54 PM  [address]  [phone]

## 2022-08-04 NOTE — PROGRESS NOTES
[facility]  Office: [address], DO, Kristi Bains, DO, Winifredsara River, DO, Baltazar Alvarado Blood, DO, Nia Yepez MD, Miller Kowalski MD, Abbie Singh MD, Oliver Encarnacion MD,  Alice Regalado MD, Dorian Ramírez MD, Martina Luque, DO, Robert King MD,  Tawana Lafleur MD, Todd James MD, Dolly Bridges, DO, Pancho Tavares MD, Bisi Lozano MD, Megan Poole MD, Gregory Elder DO, Berkley Flores MD, Brandyn Min MD, Alferd Felty, CNP,  Renetta Lopez, CNP, Ethyl Mercury, CNP, Lydia Carcamo, CNP, Jake Perry PA-C, Maria C Martinez, DNP, Ramsey Dickson, CNP, Kurt Mulligan, CNP, Mauricio Brittle, CNP, Nolberto Rosas, CNP, Nataly Cunningham, CNP, Celena Horne, CNS, Shukri Hurtado, DNP, Landen Abel, CNP, Hilaria Mondragon, CNP, Chiki Arevalo, [facility]    Progress Note    8/4/2022    3:48 PM    Name:   Librado Portillo  MRN:     [id number]     [facility]:      [de-identified]   Room:   2015/2015-01  IP Day:  2  Admit Date:  8/2/2022  3:29 PM    PCP:   MAYRA De La Rosa  Code Status:  Full Code    Subjective:     C/C:   Chief Complaint   Patient presents with    Shortness of Breath     Patient complains of shortness of breath with exertion, problem has been ongoing for months per patient. Patient had lab work done today and was sent to ER for evaluation of an elevated troponin level. Patient has a stress test scheduled on August 9th. Interval History Status:   Comfortable  No chest pain  Her anterior mandibular pain has resolved  Reporting chronic problems with dyspepsia    Data Base Updates:  [id number] High mg/dL     JLY94td/dL   Creatinine0.79     Brief History:     As documented in the medical record:  \"Angelic Cheema is a 69 y.o. Non- / non  female who presents with Shortness of Breath (Patient complains of shortness of breath with exertion, problem has been ongoing for months per patient. Patient had lab work done today and was sent to ER for evaluation of an elevated troponin level. Patient has a stress test scheduled on August 9th. )   and is admitted to the hospital for the management of Elevated troponin. This is a pleasant 59-year-old female who presented to \[facility]\"" ER for abnormal labs, patient has been having dyspnea on exertion over the last 1 month, usually have chronic sinus issues and was recently on antibiotics, she reports coug and phlegm but felt it was related to her sinus issue? .  During PCP visit CT chest and troponins were ordered, tropes were found to be elevated and CT chest with no PE but was bilateral groundglass opacities. During ER evaluation patient second set of tropes were going up, there was a plan for stress test as an outpatient ,cardiology did request patient to be transferred to our facility for further cardiac work-up. Her D-dimer was found to be 3. Patient is now very active at baseline as she does not go out much, her average A1c is around 8.   She has history of smoking but stopped more than 20 years ago  Currently She denies any chest pain, sob, nausea, vomiting, fever or chills\"    The intitial assessment and plan included:  \"  Hospital Problems               Last Modified POA     * (Principal) Elevated troponin 8/2/2022 Yes     Type 2 diabetes mellitus with hyperglycemia, with long-term current use of insulin ([address] 8/3/2022 Yes     Diabetic polyneuropathy associated with type 2 diabetes mellitus ([facility] [address] 8/3/2022 Yes     Ground glass opacity present on imaging of lung 8/3/2022 Yes      Patient status inpatient in the Progressive Unit/Step down      Elevated Troponin : patient denies any chest pain , she has dyspnea on exertion , her BNP is low,  Echo ordered   On ASA, statin, BB started  At this point no need to repeat trops  Cardiology is consulted        Elevated D dimer : PE ruled out, will get LE doppler      Bilateral ground glass infiltrates : they flank pain and hematuria. Physical Examination:        Vitals  BP (!) 127/56   Pulse 65   Temp 97.7 °F (36.5 °C) (Oral)   Resp 18   Ht 5' 6\" (1.676 m)   Wt 191 lb (86.6 kg)   SpO2 95%   BMI 30.83 kg/m²   Temp (24hrs), Av °F (36.7 °C), Min:97.5 °F (36.4 °C), Max:98.3 °F (36.8 °C)    Recent Labs     22  1559 22  2022 22  0750 22  1226   POCGLU 168* 199* 136* 181*       Physical Exam  Vitals reviewed. Constitutional:       General: She is not in acute distress. Appearance: She is not diaphoretic. HENT:      Head: Normocephalic. Nose: Nose normal.      Mouth/Throat:      Mouth: Mucous membranes are moist.      Pharynx: Oropharynx is clear. No oropharyngeal exudate or posterior oropharyngeal erythema. Eyes:      General: No scleral icterus. Conjunctiva/sclera: Conjunctivae normal.   Neck:      Trachea: No tracheal deviation. Cardiovascular:      Rate and Rhythm: Normal rate and regular rhythm. Pulmonary:      Effort: Pulmonary effort is normal. No respiratory distress. Breath sounds: Normal breath sounds. No wheezing or rales. Chest:      Chest wall: No tenderness. Abdominal:      General: There is no distension. Palpations: Abdomen is soft. Tenderness: There is no abdominal tenderness. Musculoskeletal:         General: No tenderness. Cervical back: Neck supple. Skin:     General: Skin is warm and dry. Medications: Allergies:     Allergies   Allergen Reactions    Aspirin Other (See Comments)     Upset stomach      Influenza Vaccines Other (See Comments)    Penicillins     Pneumococcal Vaccine     Seasonal        Current Meds:   Scheduled Meds:    doxycycline hyclate  100 mg Oral 2 times per day    gemfibrozil  600 mg Oral BID AC    pantoprazole  40 mg Oral Daily    insulin lispro  0-8 Units SubCUTAneous TID WC    insulin lispro  0-4 Units SubCUTAneous Nightly    sodium chloride flush  5-40 mL IntraVENous 2 times per day    enoxaparin  1 mg/kg SubCUTAneous BID    aspirin  81 mg Oral Daily    metoprolol succinate  25 mg Oral Daily    insulin glargine  25 Units SubCUTAneous Nightly    insulin lispro  5 Units SubCUTAneous TID WC     Continuous Infusions:    dextrose      sodium chloride       PRN Meds: sodium chloride flush, sodium chloride flush, glucose, dextrose bolus **OR** dextrose bolus, glucagon (rDNA), dextrose, sodium chloride flush, sodium chloride, potassium chloride **OR** potassium alternative oral replacement **OR** potassium chloride, magnesium sulfate, ondansetron **OR** ondansetron, polyethylene glycol, acetaminophen **OR** acetaminophen, calcium carbonate    Data:     I/O (24Hr):     Intake/Output Summary (Last 24 hours) at 8/4/2022 1548  Last data filed at 8/4/2022 0015  Gross per 24 hour   Intake 300 ml   Output 200 ml   Net 100 ml       Labs:  Hematology:  Recent Labs     08/02/22  1604 08/04/22  0633   WBC 6.2  --    RBC 4.38  --    HGB 14.0  --    HCT 40.9  --    MCV 93.4  --    MCH 31.9  --    MCHC 34.2  --    RDW 13.0  --      --    MPV 7.1  --    INR  --  1.0   DDIMER 0.66  --      Chemistry:  Recent Labs     08/02/22  1416 08/02/22  1423 08/02/22  1604 08/03/22  0647 08/03/22  1033 08/04/22  0633   NA  --   --  137  --   --  139   K  --   --  3.8  --   --  3.8   CL  --   --  100  --   --  105   CO2  --   --  27  --   --  25   GLUCOSE  --   --  57*  --   --  131*   BUN  --   --  14  --   --  16   CREATININE  --  0.84 0.78  --   --  0.79   ANIONGAP  --   --  10  --   --  9   LABGLOM  --  >60 >60  --   --  >60   GFRAA  --  >60 >60  --   --  >60   CALCIUM  --   --  9.5  --   --  9.3   PROBNP 131  --   --  124  --   --    TROPHS 130*  --  154* 152* 117*  --      Recent Labs     08/02/22  1609 08/03/22  0647 08/03/22  1106 08/03/22  1559 08/03/22 2022 08/04/22  0750 08/04/22  1226   CHOL  --  225*  --   --   --   --   --    HDL  --  26*  --   --   --   --   --    LDLCHOLESTEROL  --  157* --   --   --   --   --    CHOLHDLRATIO  --  8.7*  --   --   --   --   --    TRIG  --  210*  --   --   --   --   --    POCGLU 55*  --  162* 168* 199* 136* 181*     ABG:No results found for: POCPH, PHART, PH, POCPCO2, XWS6QFE, PCO2, POCPO2, PO2ART, PO2, POCHCO3, DNY9CFU, HCO3, NBEA, PBEA, BEART, BE, THGBART, THB, PLX2ZYG, QWZY2OXM, R6VVKESS, O2SAT, FIO2  No results found for: SPECIAL  No results found for: CULTURE    Radiology:  CT ABDOMEN PELVIS W IV CONTRAST Additional Contrast? None    Result Date: 8/2/2022  Negative for acute inflammatory process or bowel obstruction. Mild-to-moderate retained stool. Colonic diverticulosis. CT CHEST PULMONARY EMBOLISM W CONTRAST    Result Date: 8/2/2022  1. No evidence of pulmonary embolism. 2.  Diffuse mild ground glass parenchymal appearance with hazy bordered vessels without overt edema, appearance favoring pulmonary venous congestion. No localized consolidation or suspicious nodules. NM Cardiac Stress Test Nuclear Imaging    Result Date: 8/4/2022  Perfusion:  Negative Function:  Normal Risk stratification: LOW Notes concerning risk stratification: Risk stratification incorporates both clinical history and some testing results. Final risk determination is the responsibility of the ordering provider as other patient information and test results may increase or decrease the risk assessment reported for this examination. Risk stratification criteria are adapted from \"Noninvasive Risk Stratification\" criteria from [employer]. Al, ACC/AATS/AHA/ASE/ASNC/SCAI/SCCT/STS 2017 Appropriate Use Criteria For Coronary Revascularization in Patients With Stable Ischemic Heart Disease [facility] Volume 69, Issue 17, May 2017 High risk (>3% annual death or MI) 1. Severe resting LV dysfunction (LVEF <35%) not readily explained by non coronary causes 2. Resting perfusion abnormalities greater than 10% of the myocardium in patients without prior history or evidence of MI3.  Stress-induced perfusion abnormalities encumbering greater than or equal to 10% myocardium or stress segmental scores indicating multiple vascular territories with abnormalities 4. Stress-induced LV dilatation (TID ratio greater than 1.19 for exercise and greater than 1.39 for regadenoson) Intermediate risk (1% to 3% annual death or MI) 1. Mild/moderate resting LV dysfunction (LVEF 35% to 49%) not readily explained by non coronary causes. 2. Resting perfusion abnormalities in 5%-9.9% of the myocardium in patients without a history or prior evidence of MI 3. Stress-induced perfusion abnormality encumbering 5%-9.9% of the myocardium or stress segmental scores indicating 1 vascular territory with abnormalities but without LV dilation 4. Small wall motion abnormality involving 1-2 segments and only 1 coronary bed. Low Risk (Less than 1% annual death or MI) 1. Normal or small myocardial perfusion defect at rest or with stress encumbering less than 5% of the myocardium.        Assessment:        Primary Problem  Elevated troponin    Active Hospital Problems    Diagnosis Date Noted    Dyspnea [R06.00] 08/04/2022     Priority: Medium    Ground glass opacity present on imaging of lung [R91.8] 08/03/2022     Priority: Medium    Hypoglycemia [E16.2] 08/03/2022     Priority: Medium    Elevated troponin [R77.8] 08/02/2022     Priority: Medium    Type 2 diabetes mellitus with hyperglycemia, with long-term current use of insulin ([facility] [address] [E11.65, Z79.4] 03/24/2021    Diabetic polyneuropathy associated with type 2 diabetes mellitus ([facility] [address] [E11.42] 03/24/2021         Plan:        Ischemia has been ruled out  Discharge planning initiated  Reflux precautions   Suggest follow-up imaging to ensure complete resolution of infiltrative pulmonary changes  Suggest outpatient GI consultation for her GERD  Suggest outpatient dental evaluation for her mandibular pain  Finish doxycycline  Glycemic contol - Monitor and control blood sugars  Blood Pressure - Monitor and control   DVT prophylaxis  Discharge planning  Will discharge when arrangements complete and ok with other services.   Follow-up with PCP in one week, Chrsi Flor  Notify PCP of discharge     IP CONSULT TO CARDIOLOGY  IP CONSULT TO IV TEAM    Shaunna Epley, DO  8/4/2022  3:48 PM

## 2022-08-04 NOTE — CARE COORDINATION
Met with patient to discuss transitional planning. She is returning home independently with family. She denies needs    1830 patient qualifies for 2L home oxygen. PS sent to Dr Cecilia Carson. Met with patient. Freedom of choice provided. She is agreeable with [facility]. Ángel November notified.  Awaiting order with approved diagnosis (COPD, chronic bronchitis, chronic respiratory failure, CHF, asthma, pulmonary hypertension, emphysema, lung cancer, or covid)    Discharge Report    [facility] Case Management Department  Written by: Carmita Díaz RN    Patient Name: Roberto Colvin  Attending Provider: Telly Squires DO  Admit Date: 2022  3:29 PM  MRN: [id number]  Account: [de-identified]                     : 1945  Discharge Date: 2022      Disposition: home    Carmita Díaz RN

## 2022-08-04 NOTE — PROCEDURES
[address] [facility]                  [address] [facility], [place] [address]                              CARDIAC STRESS TEST    PATIENT NAME: eLnin Burkett                 :        1945  MED REC NO:   [id number]                             ROOM:         ACCOUNT NO:   [de-identified]                           ADMIT DATE: 2022  PROVIDER:     Alpesh Ely    DATE OF STUDY:  2022    ORDERING PROVIDER:  Gerry Fortune MD    PRIMARY CARE PROVIDER:  MAYRA Goode    INTERPRETING PHYSICIAN:  Alpesh Ely MD      LEXISCAN STRESS STUDY:  Indication:  chest pain    Procedure was explained and consent form was signed    Medications:  Lexiscan, 0.4 mg  Resting heart rate:  54 bpm  Resting blood pressure:  129/69 mm/Hg  Infusion heart rate:  80 bpm  Infusion blood pressure:  110/63 mm/Hg  Resting EKG:  Abnormal (sinus bradycardia at 55 bpm/nonspecific T wave  changes)  Stress heart response:  Normal response  Stress BP response:  Appropriate  Chest discomfort:  None  Ischemic EKG changes:  Nondiagnostic    Comments:  Repolarization changes normalized after Lexiscan bolus. No  arrhythmia. IMPRESSION:  Electrocardiographically nondiagnostic Lexiscan stress study. Radio-isotope results to follow from the department of Nuclear Medicine.       Alan Lawler    D: 2022 10:56:10       T: 2022 10:57:27     AS/LORRAINE  Job#: [id number]     Doc#: Unknown    CC:    ()

## 2022-08-04 NOTE — PROGRESS NOTES
Home Oxygen Evaluation    Home Oxygen Evaluation completed. Patient is on RA. Resting SpO2 on room air = 91%    SpO2 on room air with exercise = 88%  SpO2 on oxygen as above with exercise = 96%    Patient wears 2L NC as needed.      Shun Dorsey RCP  5:59 PM

## 2022-08-04 NOTE — PLAN OF CARE
Problem: Discharge Planning  Goal: Discharge to home or other facility with appropriate resources  Outcome: Progressing  Flowsheets (Taken 8/3/2022 1255 by Sonia Sheikh RN)  Discharge to home or other facility with appropriate resources:   Identify barriers to discharge with patient and caregiver   Identify discharge learning needs (meds, wound care, etc)     Problem: Safety - Adult  Goal: Free from fall injury  Outcome: Progressing     Problem: Chronic Conditions and Co-morbidities  Goal: Patient's chronic conditions and co-morbidity symptoms are monitored and maintained or improved  Outcome: Progressing     Problem: ABCDS Injury Assessment  Goal: Absence of physical injury  Outcome: Progressing

## 2022-08-05 PROBLEM — R07.89 NON-CARDIAC CHEST PAIN: Status: ACTIVE | Noted: 2022-08-05

## 2022-08-05 LAB — MYCOPLASMA PNEUMONIAE IGM: 0.84

## 2022-08-05 NOTE — DISCHARGE SUMMARY
[facility]  Office: [address], DO, Vashti Ronquillo, DO, Garth Neil, DO, Carli Nelson Blood, DO, Yola Lopez MD, Kacey Aceves MD, Charles Curry MD, Darren Chen MD,  Jammie Dominguez MD, Jaylon Mathew MD, Josy Dias, DO, Edna Merlos MD,  Eliel Thomason MD, Ham Queen MD, Domingo Oswald, DO, Selena Dodd MD, Severiano Border, MD, Jose London MD, Charissa Weir, DO, Windy Chan MD, Shannon Morocho MD, Smith Rivas, CNP,  Funmilayo Burgos, CNP, Sally Mancia, CNP, Lety Panchal, CNP, Carissa Cui PAJACINTA, Pantera Sauceda, DNP, Floresita Person, CNP, Tracie Thapa, CNP, Juan Pichardo, CNP, Maurizio Stevenson, CNP, Sheeba Gudino, CNP, Rudy Damico, CNS, Sarah Gamino, DNP, Claudell Belfast, CNP, Kapadia Hearing, CNP, Aretha Dillon, [facility]    Discharge Summary    Patient ID: Delicia Saucedo  :  1945   MRN: [id number]     ACCOUNT:  [de-identified]   Patient's PCP: MAYRA Darby  Admit Date: 2022   Discharge Date: 2022    Discharge Physician: Tsering Acuna DO     The patient was seen and examined on day of discharge and this discharge summary is in conjunction with any daily progress note from day of discharge.     Active Discharge Diagnoses:     Primary Problem  Non-cardiac chest pain      Hospital Problems  Active Hospital Problems    Diagnosis Date Noted    Non-cardiac chest pain [R07.89] 2022     Priority: Medium    Dyspnea [R06.00] 2022     Priority: Medium    Hypoxia [R09.02] 2022     Priority: Medium    History of COVID-19 - 2022 [Z86.16] 2022     Priority: Medium    Ground glass opacity present on imaging of lung [R91.8] 2022     Priority: Medium    Hypoglycemia [E16.2] 08/03/2022     Priority: Medium    Elevated troponin [R77.8] 08/02/2022     Priority: Medium    Type 2 diabetes mellitus with hyperglycemia, with long-term current use of insulin [facility] [E11.65, Z79.4] 03/24/2021    Diabetic polyneuropathy associated with type 2 diabetes mellitus ([facility] [E11.42] 03/24/2021         Hospital Course:     Brief History  As documented in the medical record:  \"Angelic Mosley is a 69 y.o. Non- / non  female who presents with Shortness of Breath (Patient complains of shortness of breath with exertion, problem has been ongoing for months per patient. Patient had lab work done today and was sent to ER for evaluation of an elevated troponin level. Patient has a stress test scheduled on August 9th. )   and is admitted to the hospital for the management of Elevated troponin. This is a pleasant 81-year-old female who presented to [facility] ER for abnormal labs, patient has been having dyspnea on exertion over the last 1 month, usually have chronic sinus issues and was recently on antibiotics, she reports coug and phlegm but felt it was related to her sinus issue? .  During PCP visit CT chest and troponins were ordered, tropes were found to be elevated and CT chest with no PE but was bilateral groundglass opacities. During ER evaluation patient second set of tropes were going up, there was a plan for stress test as an outpatient ,cardiology did request patient to be transferred to our facility for further cardiac work-up. Her D-dimer was found to be 3. Patient is now very active at baseline as she does not go out much, her average A1c is around 8.   She has history of smoking but stopped more than 20 years ago  Currently She denies any chest pain, sob, nausea, vomiting, fever or chills\"     The intitial assessment and plan included:  \"  Hospital Problems               Last Modified POA     * (Principal) Elevated troponin 8/2/2022 Yes     Type 2 diabetes mellitus with hyperglycemia, with long-term current use of insulin ([facility] 8/3/2022 Yes     Diabetic polyneuropathy associated with type 2 diabetes mellitus ([facility] 8/3/2022 Yes     Ground glass opacity present on imaging of lung 8/3/2022 Yes      Patient status inpatient in the Progressive Unit/Step down      Elevated Troponin : patient denies any chest pain , she has dyspnea on exertion , her BNP is low,  Echo ordered   On ASA, statin, BB started  At this point no need to repeat trops  Cardiology is consulted        Elevated D dimer : PE ruled out, will get LE doppler      Bilateral ground glass infiltrates : they favor vascular congestion , no S/S of PNA, will still check vital panel and mycoplasma     HPL: continue home medications, get lipid panel     DM2: Presented with hypoglycemia, will hold on her diabetes home medications for now, continue low intensity insulin sliding scale, hypoglycemia protocol     DVT prophylaxis\"      Subsequent database has included:            EKG:  Normal sinus rhythm  Left axis deviation  Nonspecific T wave changes  Abnormal ECG  When compared with ECG of 13-OCT-1994 13:22,  Nonspecific T wave abnormality now evident in Anterolateral leads     Stress test:  There is normal distribution of radiotracer throughout the myocardium without   evidence for a significant reversible or fixed perfusion defect.  Perfusion:  Negative   Function:  Normal   Risk stratification: LOW     The patient responded to therapy  Their status was stabilized   DC planning was initiated  The patient was instructed to follow up with their PCP, MAYRA De La Rosa in one week      The patient did qualify for home O2    Discharge plan:     Ischemia has been ruled out  Discharge planning initiated  Reflux precautions   Suggest follow-up imaging to ensure complete resolution of infiltrative pulmonary changes  Consider pulmonary consultation   Suggest outpatient GI consultation for her GERD  Suggest outpatient dental evaluation for her mandibular pain  Finish doxycycline  Glycemic contol - Monitor and control blood sugars  Blood Pressure - Monitor and control   DVT prophylaxis  Discharge planning  Will discharge when arrangements complete and ok with other services. Follow-up with PCP in one week, Chris Mahan  Notify PCP of discharge     Home oxygen ordered  Face to face encounter today indicate the requirement of DME order of  Oxygen for the diagnosis of the  COPD. Indication and side effects of treatment had been addressed with pt , pt agreeable to the current plan of using the DME    No discharge procedures on file.     Significant Diagnostic Studies:     Labs / Micro:  Labs:  Hematology:  Recent Labs     08/02/22  1604 08/04/22  0633   WBC 6.2  --    RBC 4.38  --    HGB 14.0  --    HCT 40.9  --    MCV 93.4  --    MCH 31.9  --    MCHC 34.2  --    RDW 13.0  --      --    MPV 7.1  --    INR  --  1.0   DDIMER 0.66  --      Chemistry:  Recent Labs     08/02/22  1416 08/02/22  1423 08/02/22  1604 08/03/22  0647 08/03/22  1033 08/04/22  0633   NA  --   --  137  --   --  139   K  --   --  3.8  --   --  3.8   CL  --   --  100  --   --  105   CO2  --   --  27  --   --  25   GLUCOSE  --   --  57*  --   --  131*   BUN  --   --  14  --   --  16   CREATININE  --  0.84 0.78  --   --  0.79   ANIONGAP  --   --  10  --   --  9   LABGLOM  --  >60 >60  --   --  >60   GFRAA  --  >60 >60  --   --  >60   CALCIUM  --   --  9.5  --   --  9.3   PROBNP 131  --   --  124  --   --    TROPHS 130*  --  154* 152* 117*  --      Recent Labs     08/03/22  0647 08/03/22  1106 08/03/22  1559 08/03/22 2022 08/04/22  0750 08/04/22  1226 08/04/22  1531   CHOL 225*  --   --   --   --   --   --    HDL 26*  --   --   --   --   --   --    LDLCHOLESTEROL 157*  --   --   --   --   --   --    CHOLHDLRATIO 8.7*  --   --   --   --   --   --    TRIG 210*  --   --   --   --   --   --    POCGLU  --  162* 168* 199* 136* 181* 171* Radiology:    ECHO Complete 2D W Doppler W Color    Result Date: 8/4/2022  Transthoracic Echocardiography Report (TTE)  Patient Name HAMILTON     Date of Study             08/04/2022               Alex Braga   Date of      1945  Gender                    Female  Birth   Age          69 year(s)  Race                         Room Number  2015        Height:                   66 inch, 167.64 cm   Corporate ID [id number]    Weight:                   191 pounds, 86.6 kg  #   Patient Acct [de-identified]   BSA:         1.96 m^2     BMI:        30.83  #                                                              kg/m^2   MR #         [id number]     Sonographer               Stephani December, RVT   Accession #  [id number]  Interpreting Physician    Nicole Larsen   Fellow                   Referring Nurse                           Practitioner   Interpreting             Referring Physician       Sourav Gar,  Fellow                                             APRN-NP  Type of Study   TTE procedure:2D Echocardiogram, M-Mode, Doppler, Color Doppler. Procedure Date Date: 08/04/2022 Start: 10:41 AM Study [facility] Technical Quality: Adequate visualization Indications:Dyspnea/SOB and Elevated troponin. History / Tech. Comments: Procedure explained to patient. Patient Status: Inpatient Height: 66 inches Weight: 191 pounds BSA: 1.96 m^2 BMI: 30.83 kg/m^2 CONCLUSIONS Summary Normal LV size Asymmetrical septal hypertrophy Increased LVOT, gradient, difficult to assess RENE No obvious wall motion abnormality seen. Normal LV systolic function with LVEF >55%. Normal RV size and function. LA and RA appears normal in size. No obvious significant structural valvular abnormality noted. No significant valvular stenosis or regurgitation noted. Normal aortic root dimension. No significant pericardial effusion noted.  IVC not well visualized Signature ----------------------------------------------------------------------------  Electronically signed by Nicole Larsen(Interpreting physician) on  08/04/2022 07:07 PM ---------------------------------------------------------------------------- ----------------------------------------------------------------------------  Electronically signed by Sang Drummond RVT(Sonographer) on 08/04/2022  04:46 PM ---------------------------------------------------------------------------- FINDINGS Left Atrium Left atrium is normal in size. Left Ventricle Left ventricle is normal in size. Global left ventricular systolic function is normal. Calculated ejection fraction 58% by Santos's method. Mild septal hypertrophy is noted with increased hypertrophy (Moderately) in the proximal portion measuring 1.5 cm. Possible RENE/Chordal RENE noted with increased LVOT/AV gradients. LVOT/AV gradient increased from 9 mmHg to 28 mmHg with valsalva. Right Atrium Right atrium is normal size . Right Ventricle Normal right ventricle size and function. Mitral Valve Normal mitral valve structure. Mild mitral regurgitation. No mitral stenosis. Aortic Valve Normal aortic valve structure. Aortic valve is trileaflet. Velocities are increased through the LVOT and aortic valve, however valve appears to open well. No aortic insufficiency. Tricuspid Valve Normal tricuspid valve leaflets. Trivial tricuspid regurgitation. No tricuspid stenosis. Pulmonic Valve Normal pulmonic valve structure. No pulmonic insufficiency. No evidence of pulmonic stenosis. Pericardial Effusion No pericardial effusion seen. Miscellaneous Normal aortic root dimension. Normal ascending aorta dimension. IVC not visualized.  M-mode / 2D Measurements & Calculations:   LVIDd:4.3 cm(3.7 - 5.6 cm)        Diastolic VMIPSD:18.2 ml  DRQAZ:5.1 cm(2.2 - 4.0 cm)        Systolic ORJKLR:97.7 ml  YWZC:1.3 cm(0.6 - 1.1 cm)         Aortic Root:2.8 cm(2.0 - 3.7 cm)  LVPWd:0.9 cm(0.6 - 1.1 cm)        LA Dimension: 3 cm(1.9 - 4.0 cm)  Fractional Shortenin.58 %     LA volume/Index: 42.77 ml /22m^2  Calculated LVEF (%): 58.74 %      LVOT:1.8 cm                                    RVDd:3.4 cm   Mitral:                                 Aortic   Valve Area (P1/2-Time): 4.23 cm^2       Peak Velocity: 2.04 m/s  Peak E-Wave: 0.67 m/s                   Mean Velocity: 1.12 m/s  Peak A-Wave: 0.62 m/s                   Peak Gradient: 16.65 mmHg  E/A Ratio: 1.08                         Mean Gradient: 8 mmHg  Peak Gradient: 1.8 mmHg  Mean Gradient: 1 mmHg  P1/2t: 52 msec                          Area (continuity): 3.07 cm^2                                          AV VTI: 40.8 cm   Area (continuity): 4.34 cm^2  Mean Velocity: 0.44 m/s                                           Pulmonic:                                           Peak Velocity: 1.05 m/s                                          Peak Gradient: 4.41 mmHg  Septal Wall E' velocity:0.06 m/s Lateral Wall E' velocity:0.08 m/s    CT ABDOMEN PELVIS W IV CONTRAST Additional Contrast? None    Result Date: 2022  EXAMINATION: CT OF THE ABDOMEN AND PELVIS WITH CONTRAST 2022 2:22 pm TECHNIQUE: CT of the abdomen and pelvis was performed with the administration of intravenous contrast. Multiplanar reformatted images are provided for review. Automated exposure control, iterative reconstruction, and/or weight based adjustment of the mA/kV was utilized to reduce the radiation dose to as low as reasonably achievable. COMPARISON: None. HISTORY: ORDERING SYSTEM PROVIDED HISTORY: Bloating TECHNOLOGIST PROVIDED HISTORY: STAT Creatinine as needed:->Yes Bloaoting and epigastric pain . Reason for Exam: Bloaoting and epigastric pain . , Bloating, Abdominal gas pain Relevant Medical/Surgical History: H/O appendectomy, gallbladder surgery, partial hysterectomy. FINDINGS: Lower Chest: Lung bases are clear. Minimal hypoventilatory changes noted.  Organs: Common bile duct is prominent measuring 6 mm likely related to physiologic ectasia related to previous cholecystectomy. Otherwise, the liver, spleen, adrenal glands, kidneys, and pancreas unremarkable. GI/Bowel: There is mild wall wall thickening along gastric antrum likely related to underdistention. Otherwise mild to moderate retained stool. No bowel obstruction. Colonic diverticulosis. The appendix not visualized. No pericecal inflammatory change identified. Pelvis: Bladder unremarkable. Uterus absent. No suspicious adnexal mass. Peritoneum/Retroperitoneum: Moderate aortic plaque and calcifications. Aorta is nonaneurysmal.  Mild-to-moderate plaque involving the mesenteric vessels. No free air or free fluid. Bones/Soft Tissues: Degenerate changes. Negative for acute inflammatory process or bowel obstruction. Mild-to-moderate retained stool. Colonic diverticulosis. CT CHEST PULMONARY EMBOLISM W CONTRAST    Result Date: 8/2/2022  EXAMINATION: CTA OF THE CHEST 8/2/2022 3:33 pm TECHNIQUE: CTA of the chest was performed after the administration of intravenous contrast.  Multiplanar reformatted images are provided for review. MIP images are provided for review. Automated exposure control, iterative reconstruction, and/or weight based adjustment of the mA/kV was utilized to reduce the radiation dose to as low as reasonably achievable. COMPARISON: 8 January 2022 HISTORY: ORDERING SYSTEM PROVIDED HISTORY: elevated dimer TECHNOLOGIST PROVIDED HISTORY: elevated dimer Decision Support Exception - unselect if not a suspected or confirmed emergency medical condition->Emergency Medical Condition (MA) Reason for Exam: Elevated d-dimer sob D-dimer 0.66. Normal up 2.5. FINDINGS: Pulmonary Arteries: The pulmonary arteries are adequately opacified for evaluation. No filling defects diagnostic of emboli are noted. Main pulmonary artery is normal in size. Mediastinum: No evidence of mediastinal lymphadenopathy.   The heart and pericardium deep venous thrombosis in both lower  extremities. Signature   ----------------------------------------------------------------  Electronically signed by Lorena Pretty RVT(Sonographer)  on 08/03/2022 10:03 AM  ----------------------------------------------------------------   ----------------------------------------------------------------  Electronically signed by Vance Razor Reyes,Arthur(Interpreting  physician) on 08/04/2022 03:27 PM  ----------------------------------------------------------------  Findings:   Right Impression:                    Left Impression:  The common femoral, femoral,         The common femoral, femoral,  popliteal and tibial veins           popliteal and tibial veins  demonstrate normal compressibility   demonstrate normal compressibility  and augmentation. and augmentation. Normal compressibility of the great  Normal compressibility of the great  saphenous vein. saphenous vein. Normal compressibility of the small  Normal compressibility of the small  saphenous vein. saphenous vein. Risk Factors   - The patient's risk factor(s) include: diabetes mellitus. Velocities are measured in cm/s ; Diameters are measured in cm Right Lower Extremities DVT Study Measurements Right 2D Measurements +------------------------------------+----------+---------------+----------+ ! Location                            ! Visualized! Compressibility! Thrombosis! +------------------------------------+----------+---------------+----------+ ! Common Femoral                      !Yes       ! Yes            ! None      ! +------------------------------------+----------+---------------+----------+ ! Prox Femoral                        !Yes       ! Yes            ! None      ! +------------------------------------+----------+---------------+----------+ ! Mid Femoral                         !Yes       ! Yes            ! None      ! +------------------------------------+----------+---------------+----------+ ! Dist Femoral                        !Yes       ! Yes            ! None      ! +------------------------------------+----------+---------------+----------+ ! Deep Femoral                        !No        !               !          ! +------------------------------------+----------+---------------+----------+ ! Popliteal                           !Yes       ! Yes            ! None      ! +------------------------------------+----------+---------------+----------+ ! Sapheno Femoral Junction            ! Yes       ! Yes            ! None      ! +------------------------------------+----------+---------------+----------+ ! PTV                                 ! Yes       ! Yes            ! None      ! +------------------------------------+----------+---------------+----------+ ! Peroneal                            !Yes       ! Yes            ! None      ! +------------------------------------+----------+---------------+----------+ ! Gastroc                             ! Yes       ! Yes            ! None      ! +------------------------------------+----------+---------------+----------+ ! GSV Thigh                           ! Yes       ! Yes            ! None      ! +------------------------------------+----------+---------------+----------+ ! GSV Knee                            ! Yes       ! Yes            ! None      ! +------------------------------------+----------+---------------+----------+ ! GSV Ankle                           ! Yes       ! Yes            ! None      ! +------------------------------------+----------+---------------+----------+ ! SSV                                 ! Yes       ! Yes            ! None      ! +------------------------------------+----------+---------------+----------+ Right Doppler Measurements +---------------------------+------+------+--------------------------------+ ! Location                   ! Signal!Reflux! Reflux (msec) ! +---------------------------+------+------+--------------------------------+ ! Common Femoral             !Phasic!      !                                ! +---------------------------+------+------+--------------------------------+ ! Prox Femoral               !Phasic!      !                                ! +---------------------------+------+------+--------------------------------+ ! Popliteal                  !Phasic!      !                                ! +---------------------------+------+------+--------------------------------+ Left Lower Extremities DVT Study Measurements Left 2D Measurements +------------------------------------+----------+---------------+----------+ ! Location                            ! Visualized! Compressibility! Thrombosis! +------------------------------------+----------+---------------+----------+ ! Common Femoral                      !Yes       ! Yes            ! None      ! +------------------------------------+----------+---------------+----------+ ! Prox Femoral                        !Yes       ! Yes            ! None      ! +------------------------------------+----------+---------------+----------+ ! Mid Femoral                         !Yes       ! Yes            ! None      ! +------------------------------------+----------+---------------+----------+ ! Dist Femoral                        !Yes       ! Yes            ! None      ! +------------------------------------+----------+---------------+----------+ ! Deep Femoral                        !No        !               !          ! +------------------------------------+----------+---------------+----------+ ! Popliteal                           !Yes       ! Yes            ! None      ! +------------------------------------+----------+---------------+----------+ ! Sapheno Femoral Junction            ! Yes       ! Yes            ! None      ! +------------------------------------+----------+---------------+----------+ ! PTV !Yes       !Yes            ! None      ! +------------------------------------+----------+---------------+----------+ ! Peroneal                            !Yes       ! Yes            ! None      ! +------------------------------------+----------+---------------+----------+ ! Gastroc                             ! Yes       ! Yes            ! None      ! +------------------------------------+----------+---------------+----------+ ! GSV Thigh                           ! Yes       ! Yes            ! None      ! +------------------------------------+----------+---------------+----------+ ! GSV Knee                            ! Yes       ! Yes            ! None      ! +------------------------------------+----------+---------------+----------+ ! GSV Ankle                           ! Yes       ! Yes            ! None      ! +------------------------------------+----------+---------------+----------+ ! SSV                                 ! Yes       ! Yes            ! None      ! +------------------------------------+----------+---------------+----------+ Left Doppler Measurements +---------------------------+------+------+--------------------------------+ ! Location                   ! Signal!Reflux! Reflux (msec)                   ! +---------------------------+------+------+--------------------------------+ ! Common Femoral             !Phasic!      !                                ! +---------------------------+------+------+--------------------------------+ ! Prox Femoral               !Phasic!      !                                ! +---------------------------+------+------+--------------------------------+ ! Popliteal                  !Phasic!      !                                ! +---------------------------+------+------+--------------------------------+    NM Cardiac Stress Test Nuclear Imaging    Result Date: 8/4/2022  EXAMINATION: MYOCARDIAL PERFUSION IMAGING 8/3/2022 1:30 pm TECHNIQUE: For the rest study, 31 mCi of Tc-99m labeled sestamibi were injected. SPECT images were acquired. Under cardiology supervision, 0.4 mg Lexiscan was infused. After pharmacologic stress, 34.6 mCi of Tc-99m labeled sestamibi were injected. SPECT images with ECG gating were acquired. COMPARISON: None Available. HISTORY: ORDERING SYSTEM PROVIDED HISTORY: Chest pain TECHNOLOGIST PROVIDED HISTORY: Reason for Exam: Chest pain Procedure Type->Rx Reason for Exam: Chest pain, jaw/back and arm pain, SOB, diabetic, high cholesterol prior smoker. FINDINGS: High risk images interpreted utilizing [email or web address] system and [employer]. There is normal distribution of radiotracer throughout the myocardium without evidence for a significant reversible or fixed perfusion defect. Perfusion scores are visually adjusted to account for artifact. Uptake in the stomach on the rest images complicates assessment. Summed stress score:  0 Summed rest score:  0 Summed reversibility score:  0 Function: End diastolic volume:  29RR Left ventricular ejection fraction:  76% Wall motion abnormalities:  None. TID score:  1.09 (scores greater than 1.39 are considered elevated for Lexiscan stress with Tc99m)     Perfusion:  Negative Function:  Normal Risk stratification: LOW Notes concerning risk stratification: Risk stratification incorporates both clinical history and some testing results. Final risk determination is the responsibility of the ordering provider as other patient information and test results may increase or decrease the risk assessment reported for this examination. Risk stratification criteria are adapted from \"Noninvasive Risk Stratification\" criteria from Pulsandra Valdez. Al, ACC/AATS/AHA/ASE/ASNC/SCAI/SCCT/STS 2017 Appropriate Use Criteria For Coronary Revascularization in Patients With Stable Ischemic Heart Disease [facility] Volume 69, Issue 17, May 2017 High risk (>3% annual death or MI) 1. Severe resting LV dysfunction (LVEF <35%) not readily explained by non coronary causes 2. Resting perfusion abnormalities greater than 10% of the myocardium in patients without prior history or evidence of MI3. Stress-induced perfusion abnormalities encumbering greater than or equal to 10% myocardium or stress segmental scores indicating multiple vascular territories with abnormalities 4. Stress-induced LV dilatation (TID ratio greater than 1.19 for exercise and greater than 1.39 for regadenoson) Intermediate risk (1% to 3% annual death or MI) 1. Mild/moderate resting LV dysfunction (LVEF 35% to 49%) not readily explained by non coronary causes. 2. Resting perfusion abnormalities in 5%-9.9% of the myocardium in patients without a history or prior evidence of MI 3. Stress-induced perfusion abnormality encumbering 5%-9.9% of the myocardium or stress segmental scores indicating 1 vascular territory with abnormalities but without LV dilation 4. Small wall motion abnormality involving 1-2 segments and only 1 coronary bed. Low Risk (Less than 1% annual death or MI) 1. Normal or small myocardial perfusion defect at rest or with stress encumbering less than 5% of the myocardium. Consultations:    Consults:     Final Specialist Recommendations/Findings:   IP CONSULT TO CARDIOLOGY  IP CONSULT TO IV TEAM        Discharged Condition:    Stable     Disposition: Home    Physician Follow Up:   No follow-up provider specified.      Activity:  activity as tolerated    Diet:  diabetic diet     Discharge Medications:      Medication List        CHANGE how you take these medications      insulin 70-30 (70-30) 100 UNIT per ML injection vial  Commonly known as: NovoLIN 70/30  Inject 45 Units into the skin 2 times daily  What changed:   how much to take  additional instructions     pantoprazole 40 MG tablet  Commonly known as: PROTONIX  Take 1 tablet by mouth in the morning and at bedtime  What changed: when to take this            CONTINUE taking these medications      B-D INS SYR ULTRAFINE .5CC/30G 30G X 1/2\" 0.5 ML Misc  Generic drug: Insulin Syringe-Needle U-100  1/2cc/ml syringe with 32g x 8mm needle. May use any brand  E11.9     blood glucose test strips  Test 3 times a day & as needed for symptoms of irregular blood glucose. Dispense sufficient amount for indicated testing frequency plus additional to accommodate PRN testing needs. doxycycline hyclate 100 MG tablet  Commonly known as: VIBRA-TABS  Take 1 tablet by mouth in the morning and 1 tablet before bedtime. Do all this for 10 days. empagliflozin 10 MG tablet  Commonly known as: Jardiance  Take 1 tablet by mouth in the morning. gemfibrozil 600 MG tablet  Commonly known as: LOPID  TAKE 1 TABLET BY MOUTH TWO TIMES A DAY     glipiZIDE 10 MG tablet  Commonly known as: GLUCOTROL  TAKE 1 TABLET BY MOUTH TWO TIMES A DAY before meals     meclizine 25 MG Chew  Commonly known as: ANTIVERT  Take 1 tablet by mouth 3 times daily as needed (dizziness)     OneTouch Verio Flex System w/Device Kit            STOP taking these medications      metFORMIN 500 MG extended release tablet  Commonly known as: GLUCOPHAGE-XR     simethicone 80 MG chewable tablet  Commonly known as: MYLICON               Where to Get Your Medications        These medications were sent to [facility] [address] [facility], [address], [facility] [address]      Phone: [phone]   pantoprazole 40 MG tablet         Time Spent on discharge is  34 mins in patient examination, evaluation, counseling, medication reconciliation, discharge plan and follow up. Electronically signed by   Jaycee Walsh DO  8/5/2022  7:15 AM      Thank you MAYRA Gamboa for the opportunity to be involved in this patient's care.

## 2022-08-08 ENCOUNTER — OFFICE VISIT (OUTPATIENT)
Dept: PRIMARY CARE CLINIC | Age: 77
End: 2022-08-08
Payer: MEDICARE

## 2022-08-08 VITALS
DIASTOLIC BLOOD PRESSURE: 70 MMHG | OXYGEN SATURATION: 95 % | BODY MASS INDEX: 30.57 KG/M2 | HEIGHT: 66 IN | WEIGHT: 190.2 LBS | HEART RATE: 86 BPM | SYSTOLIC BLOOD PRESSURE: 130 MMHG

## 2022-08-08 DIAGNOSIS — R77.8 ELEVATED TROPONIN: ICD-10-CM

## 2022-08-08 DIAGNOSIS — Z86.16 HISTORY OF COVID-19: ICD-10-CM

## 2022-08-08 DIAGNOSIS — R09.02 HYPOXIA: ICD-10-CM

## 2022-08-08 DIAGNOSIS — R93.1 ABNORMAL ECHOCARDIOGRAM: ICD-10-CM

## 2022-08-08 DIAGNOSIS — G47.33 OSA (OBSTRUCTIVE SLEEP APNEA): ICD-10-CM

## 2022-08-08 DIAGNOSIS — K21.9 GASTROESOPHAGEAL REFLUX DISEASE WITHOUT ESOPHAGITIS: ICD-10-CM

## 2022-08-08 DIAGNOSIS — Z09 HOSPITAL DISCHARGE FOLLOW-UP: Primary | ICD-10-CM

## 2022-08-08 DIAGNOSIS — R91.8 GROUND GLASS OPACITY PRESENT ON IMAGING OF LUNG: ICD-10-CM

## 2022-08-08 PROCEDURE — 99215 OFFICE O/P EST HI 40 MIN: CPT | Performed by: PHYSICIAN ASSISTANT

## 2022-08-08 PROCEDURE — 1111F DSCHRG MED/CURRENT MED MERGE: CPT | Performed by: PHYSICIAN ASSISTANT

## 2022-08-08 ASSESSMENT — ENCOUNTER SYMPTOMS
SINUS PAIN: 0
CHEST TIGHTNESS: 0
NAUSEA: 0
COUGH: 1
VOMITING: 0
DIARRHEA: 0
SORE THROAT: 0
SHORTNESS OF BREATH: 1

## 2022-08-08 NOTE — PROGRESS NOTES
Post-Discharge Transitional Care Follow Up      Steven Sewell   YOB: 1945    Date of Office Visit:  8/8/2022  Date of Hospital Admission: 8/2/22  Date of Hospital Discharge: 8/4/22  Readmission Risk Score (high >=14%. Medium >=10%):Readmission Risk Score: 7.1      Care management risk score Rising risk (score 2-5) and Complex Care (Scores >=6): No Risk Score On File     Non face to face  following discharge, date last encounter closed (first attempt may have been earlier): *No documented post hospital discharge outreach found in the last 14 days     Call initiated 2 business days of discharge: *No response recorded in the last 14 days     Hospital discharge follow-up  -     Veronica Cohn MD, Pulmonology, [place]  DAREK (obstructive sleep apnea)  -     Ethel Ellison MD, Pulmonology, Valentin Duarte MD, Pulmonology, [place]  Elevated troponin  -     Ethel Ellison MD, Pulmonology, [address] Fiorella DO, Cardiology, [place] [facility] present on imaging of lung  -     Ethel Ellison MD, Pulmonology, [place]  History of COVID-19 - 1/2022  -     Ethel Ellison MD, Pulmonology, [address] Fiorella DO, Cardiology, [place]  Gastroesophageal reflux disease without esophagitis  -     Nat Arredondo MD, Gastroenterology, [place]  Abnormal echocardiogram  -     [facility] - Fiorella Lance DO, Cardiology, [facility] Decision Making: high complexity  Return if symptoms worsen or fail to improve, for Follow up if symptoms persist or worsen. Subjective:   HPI: The patient was seen at [place] [facility] and saw elevated troponin. Was transferred to [facility]. They started her on heparin. Did a stress test, and echo which was normal. Patient was needing oxygen at the hospital and sent home with this.  She is using 3 L and is feeling well. Using it at night and at home. Was back on antibiotic at hospital.     Inpatient course: Discharge summary reviewed- see chart. Interval history/Current status: not using cpap at night. Helping sinuses. Wearing 3 L o2 at home. CT showed ground glass opacity. Has concentrator and tank. Patient Active Problem List   Diagnosis    DAREK (obstructive sleep apnea)    Type 2 diabetes mellitus with hyperglycemia, with long-term current use of insulin ([facility])    Gastroesophageal reflux disease without esophagitis    Diabetic polyneuropathy associated with type 2 diabetes mellitus ([facility] [address]    COVID    COVID-19    Elevated troponin    Ground glass opacity present on imaging of lung    Hypoglycemia    Dyspnea    Hypoxia    History of COVID-19 - 1/2022    Non-cardiac chest pain       Medications listed as ordered at the time of discharge from hospital     Medication List            Accurate as of August 8, 2022  3:10 PM. If you have any questions, ask your nurse or doctor. CONTINUE taking these medications      B-D INS SYR ULTRAFINE .5CC/30G 30G X 1/2\" 0.5 ML Misc  Generic drug: Insulin Syringe-Needle U-100  1/2cc/ml syringe with 32g x 8mm needle. May use any brand  E11.9     blood glucose test strips  Test 3 times a day & as needed for symptoms of irregular blood glucose. Dispense sufficient amount for indicated testing frequency plus additional to accommodate PRN testing needs. doxycycline hyclate 100 MG tablet  Commonly known as: VIBRA-TABS  Take 1 tablet by mouth in the morning and 1 tablet before bedtime. Do all this for 10 days. empagliflozin 10 MG tablet  Commonly known as: Jardiance  Take 1 tablet by mouth in the morning.      gemfibrozil 600 MG tablet  Commonly known as: LOPID  TAKE 1 TABLET BY MOUTH TWO TIMES A DAY     glipiZIDE 10 MG tablet  Commonly known as: GLUCOTROL  TAKE 1 TABLET BY MOUTH TWO TIMES A DAY before meals     insulin 70-30 (70-30) 100 UNIT per ML injection vial  Commonly known as: NovoLIN 70/30  Inject 45 Units into the skin 2 times daily     meclizine 25 MG Chew  Commonly known as: ANTIVERT  Take 1 tablet by mouth 3 times daily as needed (dizziness)     OneTouch Verio Flex System w/Device Kit     pantoprazole 40 MG tablet  Commonly known as: PROTONIX  Take 1 tablet by mouth in the morning and at bedtime               Medications marked \"taking\" at this time  Outpatient Medications Marked as Taking for the 8/8/22 encounter (Office Visit) with MAYRA Ruffin   Medication Sig Dispense Refill    pantoprazole (PROTONIX) 40 MG tablet Take 1 tablet by mouth in the morning and at bedtime 90 tablet 2    empagliflozin (JARDIANCE) 10 MG tablet Take 1 tablet by mouth in the morning. 30 tablet 2    doxycycline hyclate (VIBRA-TABS) 100 MG tablet Take 1 tablet by mouth in the morning and 1 tablet before bedtime. Do all this for 10 days. 20 tablet 0    gemfibrozil (LOPID) 600 MG tablet TAKE 1 TABLET BY MOUTH TWO TIMES A DAY 90 tablet 0    glipiZIDE (GLUCOTROL) 10 MG tablet TAKE 1 TABLET BY MOUTH TWO TIMES A DAY before meals 90 tablet 0    Blood Glucose Monitoring Suppl (ONETOUCH VERIO Loosecubes SYSTEM) w/Device KIT USE AS DIRECTED TO TEST THREE TIMES DAILY      insulin 70-30 (NOVOLIN 70/30) (70-30) 100 UNIT per ML injection vial Inject 45 Units into the skin 2 times daily 2 each 3    blood glucose monitor strips Test 3 times a day & as needed for symptoms of irregular blood glucose. Dispense sufficient amount for indicated testing frequency plus additional to accommodate PRN testing needs. 300 strip 2    Insulin Syringe-Needle U-100 (B-D INS SYR ULTRAFINE .5CC/30G) 30G X 1/2\" 0.5 ML MISC 1/2cc/ml syringe with 32g x 8mm needle.   May use any brand  E11.9 100 each 3    meclizine (ANTIVERT) 25 MG CHEW Take 1 tablet by mouth 3 times daily as needed (dizziness) 30 tablet 0        Medications patient taking as of now reconciled against medications ordered at time of hospital discharge: Yes    Review of Systems   Constitutional:  Negative for chills, fatigue and fever. HENT:  Negative for congestion, sinus pain and sore throat. Respiratory:  Positive for cough and shortness of breath. Negative for chest tightness. Gastrointestinal:  Negative for diarrhea, nausea and vomiting. Musculoskeletal:  Negative for arthralgias. Skin:  Negative for rash. Psychiatric/Behavioral:  Negative for dysphoric mood and sleep disturbance. The patient is not nervous/anxious. Objective:    /70   Pulse 86   Ht 5' 6\" (1.676 m)   Wt 190 lb 3.2 oz (86.3 kg)   SpO2 95%   BMI 30.70 kg/m²   Physical Exam  Constitutional:       General: She is not in acute distress. Appearance: Normal appearance. She is not ill-appearing. HENT:      Head: Normocephalic and atraumatic. Right Ear: External ear normal.      Left Ear: External ear normal.      Nose: Nose normal.      Mouth/Throat:      Mouth: Mucous membranes are moist.   Eyes:      Extraocular Movements: Extraocular movements intact. Conjunctiva/sclera: Conjunctivae normal.      Pupils: Pupils are equal, round, and reactive to light. Neck:      Vascular: No carotid bruit. Cardiovascular:      Rate and Rhythm: Normal rate and regular rhythm. Pulses: Normal pulses. Heart sounds: Murmur heard. Pulmonary:      Effort: Pulmonary effort is normal. No respiratory distress. Breath sounds: Normal breath sounds. Abdominal:      General: Bowel sounds are normal. There is no distension. Tenderness: There is no abdominal tenderness. Musculoskeletal:         General: Normal range of motion. Cervical back: Normal range of motion and neck supple. Lymphadenopathy:      Cervical: No cervical adenopathy. Skin:     General: Skin is warm and dry. Neurological:      General: No focal deficit present. Mental Status: She is alert and oriented to person, place, and time.    Psychiatric:         Mood and Affect: Mood normal.         Behavior: Behavior normal.         Thought Content: Thought content normal.       An electronic signature was used to authenticate this note.   --MAYRA Braxton

## 2022-08-16 DIAGNOSIS — Z79.4 TYPE 2 DIABETES MELLITUS WITH HYPERGLYCEMIA, WITH LONG-TERM CURRENT USE OF INSULIN (HCC): ICD-10-CM

## 2022-08-16 DIAGNOSIS — E11.65 TYPE 2 DIABETES MELLITUS WITH HYPERGLYCEMIA, WITH LONG-TERM CURRENT USE OF INSULIN (HCC): ICD-10-CM

## 2022-08-16 DIAGNOSIS — Z79.4 DIABETES MELLITUS DUE TO UNDERLYING CONDITION WITH DIABETIC NEUROPATHY, WITH LONG-TERM CURRENT USE OF INSULIN (HCC): ICD-10-CM

## 2022-08-16 DIAGNOSIS — E11.42 DIABETIC POLYNEUROPATHY ASSOCIATED WITH TYPE 2 DIABETES MELLITUS (HCC): ICD-10-CM

## 2022-08-16 DIAGNOSIS — E08.40 DIABETES MELLITUS DUE TO UNDERLYING CONDITION WITH DIABETIC NEUROPATHY, WITH LONG-TERM CURRENT USE OF INSULIN (HCC): ICD-10-CM

## 2022-08-16 RX ORDER — METFORMIN HYDROCHLORIDE 500 MG/1
TABLET, EXTENDED RELEASE ORAL
Qty: 30 TABLET | Refills: 0 | Status: SHIPPED | OUTPATIENT
Start: 2022-08-16 | End: 2022-09-26

## 2022-08-22 ENCOUNTER — OFFICE VISIT (OUTPATIENT)
Dept: PRIMARY CARE CLINIC | Age: 77
End: 2022-08-22
Payer: MEDICARE

## 2022-08-22 VITALS
DIASTOLIC BLOOD PRESSURE: 70 MMHG | BODY MASS INDEX: 30.31 KG/M2 | SYSTOLIC BLOOD PRESSURE: 110 MMHG | HEART RATE: 78 BPM | OXYGEN SATURATION: 95 % | WEIGHT: 187.8 LBS

## 2022-08-22 DIAGNOSIS — H60.502 ACUTE OTITIS EXTERNA OF LEFT EAR, UNSPECIFIED TYPE: Primary | ICD-10-CM

## 2022-08-22 DIAGNOSIS — J01.00 ACUTE NON-RECURRENT MAXILLARY SINUSITIS: ICD-10-CM

## 2022-08-22 PROCEDURE — 96372 THER/PROPH/DIAG INJ SC/IM: CPT | Performed by: FAMILY MEDICINE

## 2022-08-22 PROCEDURE — 99213 OFFICE O/P EST LOW 20 MIN: CPT | Performed by: FAMILY MEDICINE

## 2022-08-22 PROCEDURE — 1124F ACP DISCUSS-NO DSCNMKR DOCD: CPT | Performed by: FAMILY MEDICINE

## 2022-08-22 RX ORDER — CEFTRIAXONE 1 G/1
1000 INJECTION, POWDER, FOR SOLUTION INTRAMUSCULAR; INTRAVENOUS ONCE
Status: COMPLETED | OUTPATIENT
Start: 2022-08-22 | End: 2022-08-22

## 2022-08-22 RX ORDER — DOXYCYCLINE 100 MG/1
100 CAPSULE ORAL 2 TIMES DAILY
Qty: 20 CAPSULE | Refills: 1 | Status: SHIPPED | OUTPATIENT
Start: 2022-08-22 | End: 2022-09-01

## 2022-08-22 RX ADMIN — CEFTRIAXONE 1000 MG: 1 INJECTION, POWDER, FOR SOLUTION INTRAMUSCULAR; INTRAVENOUS at 11:04

## 2022-08-22 ASSESSMENT — ENCOUNTER SYMPTOMS
DIARRHEA: 0
SINUS PRESSURE: 1
WHEEZING: 0
RHINORRHEA: 0
FACIAL SWELLING: 1
ABDOMINAL PAIN: 0
EYE REDNESS: 1
COUGH: 0
SORE THROAT: 0
NAUSEA: 0
EYE DISCHARGE: 1
SHORTNESS OF BREATH: 1
VOMITING: 0

## 2022-08-22 NOTE — PROGRESS NOTES
[facility]  [address]  Phone: [phone]  Fax: [phone]    Jose Villasenor is a 69 y.o. female who presents today for her medical conditions/complaints as noted below. HPI:     HPI  Ear pain for 2 days   No hx of swimming or submerging in water  Has known hx of ear problems   Tried tylenol without relief     Current Outpatient Medications   Medication Sig Dispense Refill    neomycin-polymyxin-hydrocortisone (CORTISPORIN) 3.5-09108-0 otic solution Place 3 drops into the left ear 4 times daily for 5 days Instill into left Ear 10 mL 0    doxycycline monohydrate (MONODOX) 100 MG capsule Take 1 capsule by mouth 2 times daily for 10 days 20 capsule 1    metFORMIN (GLUCOPHAGE-XR) 500 MG extended release tablet TAKE 1 TABLET BY MOUTH EVERY DAY WITH BREAKFAST 30 tablet 0    pantoprazole (PROTONIX) 40 MG tablet Take 1 tablet by mouth in the morning and at bedtime 90 tablet 2    empagliflozin (JARDIANCE) 10 MG tablet Take 1 tablet by mouth in the morning. 30 tablet 2    gemfibrozil (LOPID) 600 MG tablet TAKE 1 TABLET BY MOUTH TWO TIMES A DAY 90 tablet 0    glipiZIDE (GLUCOTROL) 10 MG tablet TAKE 1 TABLET BY MOUTH TWO TIMES A DAY before meals 90 tablet 0    Blood Glucose Monitoring Suppl (ONETOUCH VERIO FLEX SYSTEM) w/Device KIT USE AS DIRECTED TO TEST THREE TIMES DAILY      insulin 70-30 (NOVOLIN 70/30) (70-30) 100 UNIT per ML injection vial Inject 45 Units into the skin 2 times daily 2 each 3    blood glucose monitor strips Test 3 times a day & as needed for symptoms of irregular blood glucose. Dispense sufficient amount for indicated testing frequency plus additional to accommodate PRN testing needs. 300 strip 2    Insulin Syringe-Needle U-100 (B-D INS SYR ULTRAFINE .5CC/30G) 30G X 1/2\" 0.5 ML MISC 1/2cc/ml syringe with 32g x 8mm needle.   May use any brand  E11.9 100 each 3    meclizine (ANTIVERT) 25 MG CHEW Take 1 tablet by mouth 3 times daily as needed (dizziness) 30 tablet 0     No current facility-administered medications for this visit. Allergies   Allergen Reactions    Aspirin Other (See Comments)     Upset stomach      Influenza Vaccines Other (See Comments)    Penicillins     Pneumococcal Vaccine     Seasonal        Subjective:      Review of Systems   Constitutional:  Negative for chills and fever. HENT:  Positive for congestion, ear pain (left ear), facial swelling (left cheek/eye) and sinus pressure. Negative for ear discharge, rhinorrhea and sore throat. Eyes:  Positive for discharge (left eye very watery) and redness (left eye). Respiratory:  Positive for shortness of breath (chronic, at home oxygen). Negative for cough and wheezing. Cardiovascular:  Negative for chest pain and palpitations. Gastrointestinal:  Negative for abdominal pain, diarrhea, nausea and vomiting. Neurological:  Negative for dizziness, light-headedness and headaches. Psychiatric/Behavioral:  Positive for sleep disturbance (terrible with ear pain). Objective:     /70   Pulse 78   Wt 187 lb 12.8 oz (85.2 kg)   SpO2 95%   BMI 30.31 kg/m²   Physical Exam  Constitutional:       Appearance: Normal appearance. She is not ill-appearing. HENT:      Head: Normocephalic and atraumatic. Right Ear: Hearing, tympanic membrane, ear canal and external ear normal. No drainage, swelling or tenderness. No middle ear effusion. There is no impacted cerumen. Tympanic membrane is not scarred, perforated, erythematous, retracted or bulging. Left Ear: Decreased hearing noted. Swelling and tenderness present. No drainage. No mastoid tenderness. Ears:      Comments: Whisper Test 0/2 left, 1/2 right. +Tug test. Preauricular and postauricular tenderness without mastoid tenderness. Nose: Rhinorrhea present. No congestion. Mouth/Throat:      Mouth: Mucous membranes are moist.      Pharynx: Oropharynx is clear.  No oropharyngeal exudate or posterior oropharyngeal erythema. Neurological:      Mental Status: She is alert. Assessment/Plan:     1. Acute otitis externa of left ear, unspecified type  2. Acute non-recurrent maxillary sinusitis     Return if symptoms worsen or fail to improve. No orders of the defined types were placed in this encounter.     Orders Placed This Encounter   Medications    cefTRIAXone (ROCEPHIN) injection 1,000 mg     Order Specific Question:   Antimicrobial Indications     Answer:   Head and Neck Infection     Order Specific Question:   Suspected Organism(s)     Answer:   acute sinusitis/ otitis externa    neomycin-polymyxin-hydrocortisone (CORTISPORIN) 3.5-49181-8 otic solution     Sig: Place 3 drops into the left ear 4 times daily for 5 days Instill into left Ear     Dispense:  10 mL     Refill:  0    doxycycline monohydrate (MONODOX) 100 MG capsule     Sig: Take 1 capsule by mouth 2 times daily for 10 days     Dispense:  20 capsule     Refill:  1             Electronically signed by Darryn Stephen 8/22/2022 at 12:14 PM

## 2022-08-25 ENCOUNTER — TELEPHONE (OUTPATIENT)
Dept: PRIMARY CARE CLINIC | Age: 77
End: 2022-08-25

## 2022-08-25 NOTE — TELEPHONE ENCOUNTER
Patient is calling asking for a referral for [facility] pulm , [facility] [address] doctor. Please call patient with phone numbers.

## 2022-08-31 DIAGNOSIS — R93.1 ABNORMAL ECHOCARDIOGRAM: ICD-10-CM

## 2022-08-31 DIAGNOSIS — R91.8 GROUND GLASS OPACITY PRESENT ON IMAGING OF LUNG: ICD-10-CM

## 2022-08-31 DIAGNOSIS — K21.9 GASTROESOPHAGEAL REFLUX DISEASE WITHOUT ESOPHAGITIS: Primary | ICD-10-CM

## 2022-09-02 ENCOUNTER — TELEPHONE (OUTPATIENT)
Dept: PRIMARY CARE CLINIC | Age: 77
End: 2022-09-02

## 2022-09-02 NOTE — TELEPHONE ENCOUNTER
Received faxed from cardiology regarding patient referral.     Cardiology - [facility] does not accept patient insurance     Patient will need to contact insurance and see who is in network

## 2022-09-04 DIAGNOSIS — E11.65 TYPE 2 DIABETES MELLITUS WITH HYPERGLYCEMIA, WITH LONG-TERM CURRENT USE OF INSULIN (HCC): ICD-10-CM

## 2022-09-04 DIAGNOSIS — E11.42 DIABETIC POLYNEUROPATHY ASSOCIATED WITH TYPE 2 DIABETES MELLITUS (HCC): ICD-10-CM

## 2022-09-04 DIAGNOSIS — Z79.4 TYPE 2 DIABETES MELLITUS WITH HYPERGLYCEMIA, WITH LONG-TERM CURRENT USE OF INSULIN (HCC): ICD-10-CM

## 2022-09-04 PROBLEM — R79.89 ELEVATED TROPONIN: Status: RESOLVED | Noted: 2022-01-01 | Resolved: 2022-01-01

## 2022-09-04 PROBLEM — R77.8 ELEVATED TROPONIN: Status: RESOLVED | Noted: 2022-08-02 | Resolved: 2022-09-04

## 2022-09-06 RX ORDER — HUMAN INSULIN 100 [USP'U]/ML
INJECTION, SUSPENSION SUBCUTANEOUS
Qty: 20 ML | Refills: 1 | Status: SHIPPED | OUTPATIENT
Start: 2022-09-06 | End: 2022-10-31 | Stop reason: SDUPTHER

## 2022-09-12 DIAGNOSIS — E11.65 TYPE 2 DIABETES MELLITUS WITH HYPERGLYCEMIA, WITH LONG-TERM CURRENT USE OF INSULIN (HCC): ICD-10-CM

## 2022-09-12 DIAGNOSIS — Z79.4 TYPE 2 DIABETES MELLITUS WITH HYPERGLYCEMIA, WITH LONG-TERM CURRENT USE OF INSULIN (HCC): ICD-10-CM

## 2022-09-12 RX ORDER — GEMFIBROZIL 600 MG/1
TABLET, FILM COATED ORAL
Qty: 180 TABLET | Refills: 3 | Status: SHIPPED | OUTPATIENT
Start: 2022-09-12 | End: 2022-10-31 | Stop reason: SDUPTHER

## 2022-09-26 DIAGNOSIS — E08.40 DIABETES MELLITUS DUE TO UNDERLYING CONDITION WITH DIABETIC NEUROPATHY, WITH LONG-TERM CURRENT USE OF INSULIN (HCC): ICD-10-CM

## 2022-09-26 DIAGNOSIS — E11.65 TYPE 2 DIABETES MELLITUS WITH HYPERGLYCEMIA, WITH LONG-TERM CURRENT USE OF INSULIN (HCC): ICD-10-CM

## 2022-09-26 DIAGNOSIS — Z79.4 DIABETES MELLITUS DUE TO UNDERLYING CONDITION WITH DIABETIC NEUROPATHY, WITH LONG-TERM CURRENT USE OF INSULIN (HCC): ICD-10-CM

## 2022-09-26 DIAGNOSIS — E11.42 DIABETIC POLYNEUROPATHY ASSOCIATED WITH TYPE 2 DIABETES MELLITUS (HCC): ICD-10-CM

## 2022-09-26 DIAGNOSIS — Z79.4 TYPE 2 DIABETES MELLITUS WITH HYPERGLYCEMIA, WITH LONG-TERM CURRENT USE OF INSULIN (HCC): ICD-10-CM

## 2022-09-26 RX ORDER — METFORMIN HYDROCHLORIDE 500 MG/1
TABLET, EXTENDED RELEASE ORAL
Qty: 90 TABLET | Refills: 1 | Status: SHIPPED | OUTPATIENT
Start: 2022-09-26 | End: 2022-10-31 | Stop reason: SDUPTHER

## 2022-09-26 RX ORDER — GLIPIZIDE 10 MG/1
TABLET ORAL
Qty: 90 TABLET | Refills: 1 | Status: SHIPPED | OUTPATIENT
Start: 2022-09-26 | End: 2022-10-31 | Stop reason: SDUPTHER

## 2022-09-26 NOTE — TELEPHONE ENCOUNTER
LAST VISIT:   8/22/2022     Future Appointments   Date Time Provider Madonna Gina   10/6/2022  3:15 PM Isam Crystal Cardona MD [id number]   10/31/2022 11:00 AM MAYRA Darby

## 2022-10-07 ENCOUNTER — TELEPHONE (OUTPATIENT)
Dept: GASTROENTEROLOGY | Age: 77
End: 2022-10-07

## 2022-10-21 ENCOUNTER — OFFICE VISIT (OUTPATIENT)
Dept: PRIMARY CARE CLINIC | Age: 77
End: 2022-10-21
Payer: MEDICARE

## 2022-10-21 VITALS
BODY MASS INDEX: 30.05 KG/M2 | SYSTOLIC BLOOD PRESSURE: 132 MMHG | DIASTOLIC BLOOD PRESSURE: 76 MMHG | OXYGEN SATURATION: 96 % | TEMPERATURE: 97.4 F | WEIGHT: 187 LBS | HEART RATE: 79 BPM | HEIGHT: 66 IN

## 2022-10-21 DIAGNOSIS — H65.113 ACUTE ALLERGIC MUCOID OTITIS MEDIA, BILATERAL: Primary | ICD-10-CM

## 2022-10-21 PROCEDURE — 1124F ACP DISCUSS-NO DSCNMKR DOCD: CPT | Performed by: PHYSICIAN ASSISTANT

## 2022-10-21 PROCEDURE — 99213 OFFICE O/P EST LOW 20 MIN: CPT | Performed by: PHYSICIAN ASSISTANT

## 2022-10-21 RX ORDER — GUAIFENESIN 600 MG/1
600 TABLET, EXTENDED RELEASE ORAL 2 TIMES DAILY
Qty: 30 TABLET | Refills: 0 | Status: SHIPPED | OUTPATIENT
Start: 2022-10-21 | End: 2022-10-31

## 2022-10-21 RX ORDER — CEFDINIR 300 MG/1
CAPSULE ORAL
Qty: 28 CAPSULE | Refills: 0 | Status: SHIPPED | OUTPATIENT
Start: 2022-10-21 | End: 2022-10-31 | Stop reason: SDUPTHER

## 2022-10-21 ASSESSMENT — ENCOUNTER SYMPTOMS
SHORTNESS OF BREATH: 0
COUGH: 0
SORE THROAT: 0
GASTROINTESTINAL NEGATIVE: 1
RHINORRHEA: 0
EYE DISCHARGE: 1
TROUBLE SWALLOWING: 0
SINUS PRESSURE: 1
FACIAL SWELLING: 0

## 2022-10-21 NOTE — PROGRESS NOTES
[facility]  [address]  Phone: [phone]  Fax: [phone]    Cade Ford is a 69 y.o. female who presents today for her medical conditions/complaintsas noted below. Chief Complaint   Patient presents with    Otalgia     Ear pain in both ears x 10 days          HPI:     HPI  Says she gets chronic sinus infections. Also that she has been getting ear infections ever since she had Covid. She has been putting in Neomycin/polymicin 2 drops  bid for 10 days in right ear and 3 days in left. Painful in both ears  No fever  Uncontrolled diabetic      Current Outpatient Medications   Medication Sig Dispense Refill    cefdinir (OMNICEF) 300 MG capsule TAKE 2 TABLETS ONCE DAILY 28 capsule 0    guaiFENesin (MUCINEX) 600 MG extended release tablet Take 1 tablet by mouth 2 times daily for 15 days 30 tablet 0    metFORMIN (GLUCOPHAGE-XR) 500 MG extended release tablet TAKE 1 TABLET BY MOUTH EVERY DAY WITH BREAKFAST 90 tablet 1    glipiZIDE (GLUCOTROL) 10 MG tablet TAKE 1 TABLET BY MOUTH TWO TIMES A DAY BEFORE MEALS 90 tablet 1    gemfibrozil (LOPID) 600 MG tablet TAKE 1 TABLET BY MOUTH TWO TIMES A  tablet 3    NOVOLIN 70/30 (70-30) 100 UNIT/ML injection vial INJECT 45 UNITS INTO THE SKIN TWICE DAILY 20 mL 1    empagliflozin (JARDIANCE) 10 MG tablet Take 1 tablet by mouth in the morning. 30 tablet 2    Blood Glucose Monitoring Suppl (ONETOUCH VERIO FLEX SYSTEM) w/Device KIT USE AS DIRECTED TO TEST THREE TIMES DAILY      blood glucose monitor strips Test 3 times a day & as needed for symptoms of irregular blood glucose. Dispense sufficient amount for indicated testing frequency plus additional to accommodate PRN testing needs. 300 strip 2    Insulin Syringe-Needle U-100 (B-D INS SYR ULTRAFINE .5CC/30G) 30G X 1/2\" 0.5 ML MISC 1/2cc/ml syringe with 32g x 8mm needle.   May use any brand  E11.9 100 each 3    meclizine (ANTIVERT) 25 MG CHEW Take 1 tablet by mouth 3 times daily as needed (dizziness) 30 tablet 0    pantoprazole (PROTONIX) 40 MG tablet Take 1 tablet by mouth in the morning and at bedtime 90 tablet 2     No current facility-administered medications for this visit. Allergies   Allergen Reactions    Aspirin Other (See Comments)     Upset stomach      Influenza Vaccines Other (See Comments)    Penicillins     Pneumococcal Vaccine     Seasonal        Subjective:      Review of Systems   Constitutional:  Positive for diaphoresis. Negative for fever. HENT:  Positive for congestion, ear pain and sinus pressure. Negative for dental problem, ear discharge, facial swelling, postnasal drip, rhinorrhea, sneezing, sore throat and trouble swallowing. Eyes:  Positive for discharge. Respiratory:  Negative for cough and shortness of breath. Cardiovascular:  Negative for chest pain. Gastrointestinal: Negative. Hematological:  Negative for adenopathy. Objective:     /76   Pulse 79   Temp 97.4 °F (36.3 °C)   Ht 5' 6\" (1.676 m)   Wt 187 lb (84.8 kg)   SpO2 96%   BMI 30.18 kg/m²   Physical Exam  Vitals and nursing note reviewed. Constitutional:       General: She is in acute distress (In pain). Appearance: Normal appearance. She is ill-appearing. HENT:      Right Ear: Drainage and tenderness present. No swelling. A middle ear effusion is present. There is no impacted cerumen. No foreign body. No mastoid tenderness. Tympanic membrane is retracted. Left Ear: Drainage, swelling and tenderness present. A middle ear effusion is present. There is no impacted cerumen. No foreign body. No mastoid tenderness. Tympanic membrane is retracted. Cardiovascular:      Rate and Rhythm: Normal rate and regular rhythm. Heart sounds: Normal heart sounds. Pulmonary:      Effort: Pulmonary effort is normal.      Breath sounds: Normal breath sounds. Skin:     Findings: No rash.    Neurological:      Mental Status: She is alert and oriented to person, place, and time. Psychiatric:         Mood and Affect: Mood normal.       Assessment:       Diagnosis Orders   1. Acute allergic mucoid otitis media, bilateral             Plan:    Maurice Avitia  Take Mucinex with her Claritin  Continue Ear drops  Needs to work on improving her sugars  Told her about [facility] and gave her a card          Return if symptoms worsen or fail to improve. No orders of the defined types were placed in this encounter.     Orders Placed This Encounter   Medications    cefdinir (OMNICEF) 300 MG capsule     Sig: TAKE 2 TABLETS ONCE DAILY     Dispense:  28 capsule     Refill:  0    guaiFENesin (MUCINEX) 600 MG extended release tablet     Sig: Take 1 tablet by mouth 2 times daily for 15 days     Dispense:  30 tablet     Refill:  0           Electronically signed by Maurice Rangel 10/21/2022 at 3:48 PM

## 2022-10-31 ENCOUNTER — OFFICE VISIT (OUTPATIENT)
Dept: PRIMARY CARE CLINIC | Age: 77
End: 2022-10-31
Payer: MEDICARE

## 2022-10-31 ENCOUNTER — IMMUNIZATION (OUTPATIENT)
Dept: PRIMARY CARE CLINIC | Age: 77
End: 2022-10-31
Payer: MEDICARE

## 2022-10-31 VITALS
DIASTOLIC BLOOD PRESSURE: 80 MMHG | SYSTOLIC BLOOD PRESSURE: 118 MMHG | HEIGHT: 66 IN | OXYGEN SATURATION: 98 % | BODY MASS INDEX: 30.37 KG/M2 | HEART RATE: 70 BPM | WEIGHT: 189 LBS

## 2022-10-31 DIAGNOSIS — Z79.4 TYPE 2 DIABETES MELLITUS WITH HYPERGLYCEMIA, WITH LONG-TERM CURRENT USE OF INSULIN (HCC): ICD-10-CM

## 2022-10-31 DIAGNOSIS — H66.11 CHRONIC TUBOTYMPANIC SUPPURATIVE OTITIS MEDIA OF RIGHT EAR: ICD-10-CM

## 2022-10-31 DIAGNOSIS — Z23 NEED FOR VACCINATION: Primary | ICD-10-CM

## 2022-10-31 DIAGNOSIS — R42 VERTIGO: ICD-10-CM

## 2022-10-31 DIAGNOSIS — E11.65 TYPE 2 DIABETES MELLITUS WITH HYPERGLYCEMIA, WITH LONG-TERM CURRENT USE OF INSULIN (HCC): ICD-10-CM

## 2022-10-31 DIAGNOSIS — E08.40 DIABETES MELLITUS DUE TO UNDERLYING CONDITION WITH DIABETIC NEUROPATHY, WITH LONG-TERM CURRENT USE OF INSULIN (HCC): ICD-10-CM

## 2022-10-31 DIAGNOSIS — Z11.59 ENCOUNTER FOR HEPATITIS C SCREENING TEST FOR LOW RISK PATIENT: ICD-10-CM

## 2022-10-31 DIAGNOSIS — Z78.0 MENOPAUSE: ICD-10-CM

## 2022-10-31 DIAGNOSIS — H66.90 EAR INFECTION: Primary | ICD-10-CM

## 2022-10-31 DIAGNOSIS — Z79.4 DIABETES MELLITUS DUE TO UNDERLYING CONDITION WITH DIABETIC NEUROPATHY, WITH LONG-TERM CURRENT USE OF INSULIN (HCC): ICD-10-CM

## 2022-10-31 DIAGNOSIS — E11.42 DIABETIC POLYNEUROPATHY ASSOCIATED WITH TYPE 2 DIABETES MELLITUS (HCC): ICD-10-CM

## 2022-10-31 DIAGNOSIS — K21.9 GASTROESOPHAGEAL REFLUX DISEASE WITHOUT ESOPHAGITIS: ICD-10-CM

## 2022-10-31 LAB — HBA1C MFR BLD: 6.6 %

## 2022-10-31 PROCEDURE — 99214 OFFICE O/P EST MOD 30 MIN: CPT | Performed by: PHYSICIAN ASSISTANT

## 2022-10-31 PROCEDURE — 91312 COVID-19, PFIZER BIVALENT BOOSTER, (AGE 12Y+), IM, 30 MCG/0.3 ML: CPT | Performed by: PHYSICIAN ASSISTANT

## 2022-10-31 PROCEDURE — 0124A COVID-19, PFIZER BIVALENT BOOSTER, (AGE 12Y+), IM, 30 MCG/0.3 ML: CPT | Performed by: PHYSICIAN ASSISTANT

## 2022-10-31 PROCEDURE — 1124F ACP DISCUSS-NO DSCNMKR DOCD: CPT | Performed by: PHYSICIAN ASSISTANT

## 2022-10-31 PROCEDURE — 3044F HG A1C LEVEL LT 7.0%: CPT | Performed by: PHYSICIAN ASSISTANT

## 2022-10-31 PROCEDURE — 83036 HEMOGLOBIN GLYCOSYLATED A1C: CPT | Performed by: PHYSICIAN ASSISTANT

## 2022-10-31 RX ORDER — GEMFIBROZIL 600 MG/1
TABLET, FILM COATED ORAL
Qty: 180 TABLET | Refills: 3 | Status: SHIPPED | OUTPATIENT
Start: 2022-10-31

## 2022-10-31 RX ORDER — GLIPIZIDE 10 MG/1
TABLET ORAL
Qty: 90 TABLET | Refills: 1 | Status: SHIPPED | OUTPATIENT
Start: 2022-10-31

## 2022-10-31 RX ORDER — METFORMIN HYDROCHLORIDE 500 MG/1
TABLET, EXTENDED RELEASE ORAL
Qty: 90 TABLET | Refills: 1 | Status: SHIPPED | OUTPATIENT
Start: 2022-10-31

## 2022-10-31 RX ORDER — PANTOPRAZOLE SODIUM 40 MG/1
40 TABLET, DELAYED RELEASE ORAL 2 TIMES DAILY
Qty: 90 TABLET | Refills: 1 | Status: SHIPPED | OUTPATIENT
Start: 2022-10-31

## 2022-10-31 RX ORDER — MECLIZINE HCL 25MG 25 MG/1
25 TABLET, CHEWABLE ORAL 3 TIMES DAILY PRN
Qty: 90 TABLET | Refills: 1 | Status: SHIPPED | OUTPATIENT
Start: 2022-10-31

## 2022-10-31 RX ORDER — CEFDINIR 300 MG/1
CAPSULE ORAL
Qty: 28 CAPSULE | Refills: 0 | Status: CANCELLED | OUTPATIENT
Start: 2022-10-31

## 2022-10-31 RX ORDER — CEFDINIR 300 MG/1
CAPSULE ORAL
Qty: 14 CAPSULE | Refills: 0 | Status: SHIPPED | OUTPATIENT
Start: 2022-10-31

## 2022-10-31 ASSESSMENT — ENCOUNTER SYMPTOMS
COUGH: 0
ABDOMINAL PAIN: 0
ABDOMINAL DISTENTION: 0
RHINORRHEA: 0
VOMITING: 0
CONSTIPATION: 0
SORE THROAT: 0
DIARRHEA: 0
SHORTNESS OF BREATH: 0
CHEST TIGHTNESS: 0
SINUS PRESSURE: 0

## 2022-10-31 NOTE — PROGRESS NOTES
[facility] PRIMARY CARE  [id number] Rene Johnson  [address] [address]  Dept: [address] [place] [address] Abdulaziz Chowdary is a 69 y.o. female Established patient, who presents today for her medical conditions/complaints as noted below. Chief Complaint   Patient presents with    Diabetes     Patient is here today for 3 mo follow up. Patient states she would like to have ears rechecked. Patient was in to see Jb Carson PA-C for ear infection. HPI:     HPI: The patient was seen by Beba Blount for ear infection. Started on medication would like ears rechecked. Also here for 3-month follow-up for diabetes. Hyperlipidemia not willing to take statin due to liver failure in past from this. Her sugars have been doing well. Low sugar of 89.      Reviewed prior notes None  Reviewed previous Labs    LDL Cholesterol (mg/dL)   Date Value   08/03/2022 157 (H)   07/18/2022 186 (H)   03/26/2021 173 (H)       (goal LDL is <100)   AST (U/L)   Date Value   07/18/2022 25     ALT (U/L)   Date Value   07/18/2022 24     BUN (mg/dL)   Date Value   08/04/2022 16     Hemoglobin A1C (%)   Date Value   10/31/2022 6.6     TSH (uIU/mL)   Date Value   07/18/2022 3.32     BP Readings from Last 3 Encounters:   10/31/22 118/80   10/21/22 132/76   08/22/22 110/70          (goal 120/80)  Hemoglobin A1C   Date Value Ref Range Status   10/31/2022 6.6 % Final     Past Medical History:   Diagnosis Date    COVID 01/08/2022    Diabetes mellitus (HCC)     Seasonal allergies       Past Surgical History:   Procedure Laterality Date    APPENDECTOMY      GALLBLADDER SURGERY      HAND SURGERY N/A     Both hands and right twice     KNEE ARTHROPLASTY      PARTIAL HYSTERECTOMY (CERVIX NOT REMOVED) Right     Age 33. 1 ovary and F. tube    SINUS SURGERY N/A     Two       Family History   Problem Relation Age of Onset    Kidney Disease Mother     Other Mother     Cancer Mother     Heart Disease Father     Stroke Father     Cancer Sister Cancer Brother        Social History     Tobacco Use    Smoking status: Former     Packs/day: 1.00     Years: 20.00     Pack years: 20.00     Types: Cigarettes     Quit date: 46     Years since quittin.8    Smokeless tobacco: Never   Substance Use Topics    Alcohol use: Never      Current Outpatient Medications   Medication Sig Dispense Refill    insulin 70-30 (NOVOLIN 70/30) (70-30) 100 UNIT per ML injection vial INJECT 45 UNITS INTO THE SKIN TWICE DAILY 20 mL 1    glipiZIDE (GLUCOTROL) 10 MG tablet TAKE 1 TABLET BY MOUTH TWO TIMES A DAY BEFORE MEALS 90 tablet 1    gemfibrozil (LOPID) 600 MG tablet TAKE 1 TABLET BY MOUTH TWO TIMES A  tablet 3    meclizine (ANTIVERT) 25 MG CHEW Take 1 tablet by mouth 3 times daily as needed (dizziness) 90 tablet 1    metFORMIN (GLUCOPHAGE-XR) 500 MG extended release tablet TAKE 1 TABLET BY MOUTH EVERY DAY WITH BREAKFAST 90 tablet 1    pantoprazole (PROTONIX) 40 MG tablet Take 1 tablet by mouth in the morning and at bedtime 90 tablet 1    cefdinir (OMNICEF) 300 MG capsule TAKE 2 TABLETS ONCE DAILY 14 capsule 0    Blood Glucose Monitoring Suppl (ONETOUCH VERIO FLEX SYSTEM) w/Device KIT USE AS DIRECTED TO TEST THREE TIMES DAILY      blood glucose monitor strips Test 3 times a day & as needed for symptoms of irregular blood glucose. Dispense sufficient amount for indicated testing frequency plus additional to accommodate PRN testing needs. 300 strip 2    Insulin Syringe-Needle U-100 (B-D INS SYR ULTRAFINE .5CC/30G) 30G X 1/2\" 0.5 ML MISC 1/2cc/ml syringe with 32g x 8mm needle. May use any brand  E11.9 100 each 3     No current facility-administered medications for this visit. Allergies   Allergen Reactions    Aspirin Other (See Comments)     Upset stomach      Influenza Vaccines Other (See Comments)    Penicillins     Pneumococcal Vaccine     Seasonal     Statins Other (See Comments)     Liver failure.         Health Maintenance   Topic Date Due    Hepatitis C screen Never done    DTaP/Tdap/Td vaccine (1 - Tdap) Never done    DEXA (modify frequency per FRAX score)  Never done    Shingles vaccine (2 of 3) 12/31/2009    COVID-19 Vaccine (3 - Booster for Pfizer series) 06/21/2021    Depression Screen  07/29/2023    Annual Wellness Visit (AWV)  07/30/2023    Hepatitis A vaccine  Aged Out    Hib vaccine  Aged Out    Meningococcal (ACWY) vaccine  Aged Out       Subjective:      Review of Systems   Constitutional:  Negative for chills, fever and unexpected weight change. HENT:  Positive for ear pain and hearing loss. Negative for congestion, rhinorrhea, sinus pressure and sore throat. Respiratory:  Negative for cough, chest tightness and shortness of breath. Cardiovascular:  Negative for chest pain, palpitations and leg swelling. Gastrointestinal:  Negative for abdominal distention, abdominal pain, constipation, diarrhea and vomiting. Genitourinary:  Negative for decreased urine volume, difficulty urinating, frequency and urgency. Musculoskeletal:  Negative for arthralgias, gait problem and myalgias. Skin:  Negative for rash. Neurological:  Negative for dizziness, weakness, numbness and headaches. Hematological:  Negative for adenopathy. Psychiatric/Behavioral:  Negative for dysphoric mood and sleep disturbance. The patient is not nervous/anxious. Objective:     /80   Pulse 70   Ht 5' 6\" (1.676 m)   Wt 189 lb (85.7 kg)   SpO2 98%   BMI 30.51 kg/m²   Physical Exam  Constitutional:       General: She is not in acute distress. Appearance: Normal appearance. She is not ill-appearing. HENT:      Head: Normocephalic and atraumatic. Right Ear: External ear normal. Drainage present. No mastoid tenderness. Tympanic membrane is perforated. Left Ear: External ear normal. No tenderness. No mastoid tenderness. Tympanic membrane is not injected or perforated.       Nose: Nose normal.      Mouth/Throat:      Mouth: Mucous membranes are moist. Neck:      Vascular: No carotid bruit. Cardiovascular:      Rate and Rhythm: Normal rate and regular rhythm. Pulses: Normal pulses. Heart sounds: Normal heart sounds. Pulmonary:      Effort: Pulmonary effort is normal. No respiratory distress. Breath sounds: Normal breath sounds. Musculoskeletal:         General: Normal range of motion. Cervical back: Normal range of motion and neck supple. Lymphadenopathy:      Cervical: No cervical adenopathy. Skin:     General: Skin is warm and dry. Neurological:      General: No focal deficit present. Mental Status: She is alert and oriented to person, place, and time. Psychiatric:         Mood and Affect: Mood normal.         Behavior: Behavior normal.         Thought Content: Thought content normal.       Assessment and Plan:          1. Ear infection  2. Type 2 diabetes mellitus with hyperglycemia, with long-term current use of insulin ([facility])  -     insulin 70-30 (NOVOLIN 70/30) (70-30) 100 UNIT per ML injection vial; INJECT 45 UNITS INTO THE SKIN TWICE DAILY, Disp-20 mL, R-1Normal  -     glipiZIDE (GLUCOTROL) 10 MG tablet; TAKE 1 TABLET BY MOUTH TWO TIMES A DAY BEFORE MEALS, Disp-90 tablet, R-1Normal  -     gemfibrozil (LOPID) 600 MG tablet; TAKE 1 TABLET BY MOUTH TWO TIMES A DAY, Disp-180 tablet, R-3Normal  -     metFORMIN (GLUCOPHAGE-XR) 500 MG extended release tablet; TAKE 1 TABLET BY MOUTH EVERY DAY WITH BREAKFAST, Disp-90 tablet, R-1Normal  3. Diabetic polyneuropathy associated with type 2 diabetes mellitus ([facility])  -     insulin 70-30 (NOVOLIN 70/30) (70-30) 100 UNIT per ML injection vial; INJECT 45 UNITS INTO THE SKIN TWICE DAILY, Disp-20 mL, R-1Normal  -     metFORMIN (GLUCOPHAGE-XR) 500 MG extended release tablet; TAKE 1 TABLET BY MOUTH EVERY DAY WITH BREAKFAST, Disp-90 tablet, R-1Normal  4.  Diabetes mellitus due to underlying condition with diabetic neuropathy, with long-term current use of insulin ([facility])   -     glipiZIDE (GLUCOTROL) 10 MG tablet; TAKE 1 TABLET BY MOUTH TWO TIMES A DAY BEFORE MEALS, Disp-90 tablet, R-1Normal  -     metFORMIN (GLUCOPHAGE-XR) 500 MG extended release tablet; TAKE 1 TABLET BY MOUTH EVERY DAY WITH BREAKFAST, Disp-90 tablet, R-1Normal  -     POCT glycosylated hemoglobin (Hb A1C)  5. Vertigo  -     meclizine (ANTIVERT) 25 MG CHEW; Take 1 tablet by mouth 3 times daily as needed (dizziness), Disp-90 tablet, R-1Normal  6. Diabetes mellitus due to underlying condition with diabetic neuropathy, with long-term current use of insulin (HCC)  -     glipiZIDE (GLUCOTROL) 10 MG tablet; TAKE 1 TABLET BY MOUTH TWO TIMES A DAY BEFORE MEALS, Disp-90 tablet, R-1Normal  -     metFORMIN (GLUCOPHAGE-XR) 500 MG extended release tablet; TAKE 1 TABLET BY MOUTH EVERY DAY WITH BREAKFAST, Disp-90 tablet, R-1Normal  -     POCT glycosylated hemoglobin (Hb A1C)  7. Gastroesophageal reflux disease without esophagitis  -     pantoprazole (PROTONIX) 40 MG tablet; Take 1 tablet by mouth in the morning and at bedtime, Disp-90 tablet, R-1Normal  8. Menopause  -     DEXA BONE DENSITY 2 SITES; Future  9. Encounter for hepatitis C screening test for low risk patient  -     Hepatitis C Antibody; Future  10. 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 Patient given educational materials - see patient instructions. Discussed use, benefit, and side effects of prescribed medications. All patient questions answered. Pt voiced understanding. Reviewed health maintenance. Instructed to continue current medications, diet and exercise. Patient agreed with treatment plan. Follow up as directed.      Electronically signed by Nelly Lesches, PA on 10/31/2022 at 11:36 AM

## 2022-12-09 ENCOUNTER — TELEPHONE (OUTPATIENT)
Dept: PRIMARY CARE CLINIC | Age: 77
End: 2022-12-09

## 2022-12-09 DIAGNOSIS — Z79.4 TYPE 2 DIABETES MELLITUS WITH HYPERGLYCEMIA, WITH LONG-TERM CURRENT USE OF INSULIN (HCC): Primary | ICD-10-CM

## 2022-12-09 DIAGNOSIS — E11.65 TYPE 2 DIABETES MELLITUS WITH HYPERGLYCEMIA, WITH LONG-TERM CURRENT USE OF INSULIN (HCC): Primary | ICD-10-CM

## 2022-12-09 NOTE — TELEPHONE ENCOUNTER
We have down 45 units twice daily. Please see if this is what patient is actually taking so I can put in a accurate prescription. Does patient still want vials?

## 2022-12-09 NOTE — TELEPHONE ENCOUNTER
Novolin 70/30 no longer covered by pt's insurance, states preferred alternative is Humulin 70/30 vial. Please send in if appropriate.

## 2022-12-13 RX ORDER — PEN NEEDLE, DIABETIC 31 G X1/4"
1 NEEDLE, DISPOSABLE MISCELLANEOUS DAILY
Qty: 100 EACH | Refills: 3 | Status: SHIPPED | OUTPATIENT
Start: 2022-12-13

## 2022-12-13 RX ORDER — INSULIN HUMAN 100 [IU]/ML
45 INJECTION, SUSPENSION SUBCUTANEOUS 2 TIMES DAILY
Qty: 5 ADJUSTABLE DOSE PRE-FILLED PEN SYRINGE | Refills: 3 | Status: SHIPPED | OUTPATIENT
Start: 2022-12-13

## 2022-12-13 NOTE — TELEPHONE ENCOUNTER
Pt said that she doesn't always use 45 units at hs, just depends on her level. Has been using the vials, doesn't matter, per pt. Anshu Hyatt.

## 2022-12-14 NOTE — TELEPHONE ENCOUNTER
Left message informing patient that the requested medication was sent to pharmacy. Didn't leave any other information. [employer]  message was left. I asked that patient call us back with any questions.

## 2023-01-01 ENCOUNTER — HOSPITAL ENCOUNTER (EMERGENCY)
Age: 78
End: 2023-03-07
Attending: EMERGENCY MEDICINE
Payer: MEDICARE

## 2023-01-01 ENCOUNTER — APPOINTMENT (OUTPATIENT)
Dept: CT IMAGING | Age: 78
End: 2023-01-01
Payer: MEDICARE

## 2023-01-01 ENCOUNTER — APPOINTMENT (OUTPATIENT)
Dept: GENERAL RADIOLOGY | Age: 78
End: 2023-01-01
Payer: MEDICARE

## 2023-01-01 DIAGNOSIS — E16.2 HYPOGLYCEMIA: Primary | ICD-10-CM

## 2023-01-01 DIAGNOSIS — J32.9 CHRONIC SINUSITIS, UNSPECIFIED LOCATION: Primary | ICD-10-CM

## 2023-01-01 DIAGNOSIS — I46.9 CARDIOPULMONARY ARREST (HCC): ICD-10-CM

## 2023-01-01 DIAGNOSIS — I21.4 NSTEMI (NON-ST ELEVATED MYOCARDIAL INFARCTION) (HCC): ICD-10-CM

## 2023-01-01 DIAGNOSIS — T68.XXXA HYPOTHERMIA, INITIAL ENCOUNTER: ICD-10-CM

## 2023-01-01 LAB
ABSOLUTE EOS #: <0.03 K/UL (ref 0–0.44)
ABSOLUTE IMMATURE GRANULOCYTE: 0.29 K/UL (ref 0–0.3)
ABSOLUTE LYMPH #: 2.96 K/UL (ref 1.1–3.7)
ABSOLUTE MONO #: 0.58 K/UL (ref 0.1–1.2)
ACETAMINOPHEN LEVEL: <5 UG/ML (ref 10–30)
ALBUMIN SERPL-MCNC: 3.4 G/DL (ref 3.5–5.2)
ALBUMIN/GLOBULIN RATIO: 1.1 (ref 1–2.5)
ALP SERPL-CCNC: 58 U/L (ref 35–104)
ALT SERPL-CCNC: 30 U/L (ref 5–33)
ANION GAP SERPL CALCULATED.3IONS-SCNC: 27 MMOL/L (ref 9–17)
AST SERPL-CCNC: 75 U/L
BASOPHILS # BLD: 0 % (ref 0–2)
BASOPHILS ABSOLUTE: 0.03 K/UL (ref 0–0.2)
BILIRUB SERPL-MCNC: 0.2 MG/DL (ref 0.3–1.2)
BNP SERPL-MCNC: 473 PG/ML
BUN SERPL-MCNC: 17 MG/DL (ref 8–23)
CALCIUM SERPL-MCNC: 8.9 MG/DL (ref 8.6–10.4)
CHLORIDE SERPL-SCNC: 100 MMOL/L (ref 98–107)
CK SERPL-CCNC: 1227 U/L (ref 26–192)
CO2 SERPL-SCNC: 13 MMOL/L (ref 20–31)
CREAT SERPL-MCNC: 1.58 MG/DL (ref 0.5–0.9)
EOSINOPHILS RELATIVE PERCENT: 0 % (ref 1–4)
ETHANOL PERCENT: <0.01 %
ETHANOL: <10 MG/DL
GFR SERPL CREATININE-BSD FRML MDRD: 22 ML/MIN/1.73M2
GLUCOSE BLD-MCNC: 281 MG/DL (ref 65–105)
GLUCOSE BLD-MCNC: 320 MG/DL (ref 65–105)
GLUCOSE BLD-MCNC: 338 MG/DL (ref 65–105)
GLUCOSE BLD-MCNC: 363 MG/DL (ref 65–105)
GLUCOSE BLD-MCNC: 71 MG/DL (ref 65–105)
GLUCOSE SERPL-MCNC: 323 MG/DL (ref 70–99)
HCT VFR BLD AUTO: 37.4 % (ref 36.3–47.1)
HGB BLD-MCNC: 11.8 G/DL (ref 11.9–15.1)
IMMATURE GRANULOCYTES: 2 %
LACTIC ACID, WHOLE BLOOD: 10.3 MMOL/L (ref 0.7–2.1)
LACTIC ACID, WHOLE BLOOD: 12 MMOL/L (ref 0.7–2.1)
LIPASE SERPL-CCNC: 35 U/L (ref 13–60)
LYMPHOCYTES # BLD: 23 % (ref 24–43)
MAGNESIUM SERPL-MCNC: 2.3 MG/DL (ref 1.6–2.6)
MCH RBC QN AUTO: 32 PG (ref 25.2–33.5)
MCHC RBC AUTO-ENTMCNC: 31.6 G/DL (ref 28.4–34.8)
MCV RBC AUTO: 101.4 FL (ref 82.6–102.9)
MONOCYTES # BLD: 5 % (ref 3–12)
NRBC AUTOMATED: 0 PER 100 WBC
PDW BLD-RTO: 13 % (ref 11.8–14.4)
PLATELET # BLD AUTO: 277 K/UL (ref 138–453)
PMV BLD AUTO: 9.2 FL (ref 8.1–13.5)
POTASSIUM SERPL-SCNC: 3.7 MMOL/L (ref 3.7–5.3)
PROT SERPL-MCNC: 6.4 G/DL (ref 6.4–8.3)
RBC # BLD: 3.69 M/UL (ref 3.95–5.11)
SALICYLATE LEVEL: <1 MG/DL (ref 3–10)
SEG NEUTROPHILS: 70 % (ref 36–65)
SEGMENTED NEUTROPHILS ABSOLUTE COUNT: 8.79 K/UL (ref 1.5–8.1)
SODIUM SERPL-SCNC: 140 MMOL/L (ref 135–144)
T4 FREE SERPL-MCNC: 0.95 NG/DL (ref 0.93–1.7)
TOXIC TRICYCLIC SC,BLOOD: NEGATIVE
TROPONIN I SERPL DL<=0.01 NG/ML-MCNC: 1167 NG/L (ref 0–14)
TROPONIN I SERPL DL<=0.01 NG/ML-MCNC: 737 NG/L (ref 0–14)
TSH SERPL-ACNC: 5.44 UIU/ML (ref 0.3–5)
WBC # BLD AUTO: 12.7 K/UL (ref 3.5–11.3)

## 2023-01-01 PROCEDURE — 99285 EMERGENCY DEPT VISIT HI MDM: CPT

## 2023-01-01 PROCEDURE — 84439 ASSAY OF FREE THYROXINE: CPT

## 2023-01-01 PROCEDURE — 71045 X-RAY EXAM CHEST 1 VIEW: CPT

## 2023-01-01 PROCEDURE — 72125 CT NECK SPINE W/O DYE: CPT

## 2023-01-01 PROCEDURE — 82550 ASSAY OF CK (CPK): CPT

## 2023-01-01 PROCEDURE — 80053 COMPREHEN METABOLIC PANEL: CPT

## 2023-01-01 PROCEDURE — 83690 ASSAY OF LIPASE: CPT

## 2023-01-01 PROCEDURE — 1200000000 HC SEMI PRIVATE

## 2023-01-01 PROCEDURE — 70450 CT HEAD/BRAIN W/O DYE: CPT

## 2023-01-01 PROCEDURE — 93005 ELECTROCARDIOGRAM TRACING: CPT

## 2023-01-01 PROCEDURE — 71260 CT THORAX DX C+: CPT

## 2023-01-01 PROCEDURE — 80307 DRUG TEST PRSMV CHEM ANLYZR: CPT

## 2023-01-01 PROCEDURE — 84443 ASSAY THYROID STIM HORMONE: CPT

## 2023-01-01 PROCEDURE — 96375 TX/PRO/DX INJ NEW DRUG ADDON: CPT

## 2023-01-01 PROCEDURE — 83880 ASSAY OF NATRIURETIC PEPTIDE: CPT

## 2023-01-01 PROCEDURE — 83735 ASSAY OF MAGNESIUM: CPT

## 2023-01-01 PROCEDURE — 80143 DRUG ASSAY ACETAMINOPHEN: CPT

## 2023-01-01 PROCEDURE — 85025 COMPLETE CBC W/AUTO DIFF WBC: CPT

## 2023-01-01 PROCEDURE — 3209999900 CT LUMBAR SPINE TRAUMA RECONSTRUCTION

## 2023-01-01 PROCEDURE — 82947 ASSAY GLUCOSE BLOOD QUANT: CPT

## 2023-01-01 PROCEDURE — 83605 ASSAY OF LACTIC ACID: CPT

## 2023-01-01 PROCEDURE — 6360000002 HC RX W HCPCS: Performed by: STUDENT IN AN ORGANIZED HEALTH CARE EDUCATION/TRAINING PROGRAM

## 2023-01-01 PROCEDURE — G0480 DRUG TEST DEF 1-7 CLASSES: HCPCS

## 2023-01-01 PROCEDURE — 93010 ELECTROCARDIOGRAM REPORT: CPT | Performed by: INTERNAL MEDICINE

## 2023-01-01 PROCEDURE — 2580000003 HC RX 258: Performed by: STUDENT IN AN ORGANIZED HEALTH CARE EDUCATION/TRAINING PROGRAM

## 2023-01-01 PROCEDURE — 84484 ASSAY OF TROPONIN QUANT: CPT

## 2023-01-01 PROCEDURE — 93005 ELECTROCARDIOGRAM TRACING: CPT | Performed by: STUDENT IN AN ORGANIZED HEALTH CARE EDUCATION/TRAINING PROGRAM

## 2023-01-01 PROCEDURE — 96365 THER/PROPH/DIAG IV INF INIT: CPT

## 2023-01-01 PROCEDURE — 3209999900 CT THORACIC SPINE TRAUMA RECONSTRUCTION

## 2023-01-01 PROCEDURE — 96374 THER/PROPH/DIAG INJ IV PUSH: CPT

## 2023-01-01 PROCEDURE — 2500000003 HC RX 250 WO HCPCS

## 2023-01-01 PROCEDURE — 2580000003 HC RX 258

## 2023-01-01 PROCEDURE — 83874 ASSAY OF MYOGLOBIN: CPT

## 2023-01-01 PROCEDURE — 6360000004 HC RX CONTRAST MEDICATION: Performed by: STUDENT IN AN ORGANIZED HEALTH CARE EDUCATION/TRAINING PROGRAM

## 2023-01-01 PROCEDURE — 80179 DRUG ASSAY SALICYLATE: CPT

## 2023-01-01 RX ORDER — 0.9 % SODIUM CHLORIDE 0.9 %
1000 INTRAVENOUS SOLUTION INTRAVENOUS ONCE
Status: COMPLETED | OUTPATIENT
Start: 2023-01-01 | End: 2023-01-01

## 2023-01-01 RX ORDER — OCTREOTIDE ACETATE 50 UG/ML
50 INJECTION, SOLUTION INTRAVENOUS; SUBCUTANEOUS ONCE
Status: COMPLETED | OUTPATIENT
Start: 2023-01-01 | End: 2023-01-01

## 2023-01-01 RX ORDER — DEXTROSE AND SODIUM CHLORIDE 5; .9 G/100ML; G/100ML
INJECTION, SOLUTION INTRAVENOUS CONTINUOUS
Status: DISCONTINUED | OUTPATIENT
Start: 2023-01-01 | End: 2023-03-07 | Stop reason: HOSPADM

## 2023-01-01 RX ORDER — DEXTROSE MONOHYDRATE 25 G/50ML
25 INJECTION, SOLUTION INTRAVENOUS ONCE
Status: COMPLETED | OUTPATIENT
Start: 2023-01-01 | End: 2023-01-01

## 2023-01-01 RX ORDER — DEXTROSE AND SODIUM CHLORIDE 5; .9 G/100ML; G/100ML
INJECTION, SOLUTION INTRAVENOUS
Status: COMPLETED
Start: 2023-01-01 | End: 2023-01-01

## 2023-01-01 RX ORDER — DEXTROSE MONOHYDRATE 25 G/50ML
INJECTION, SOLUTION INTRAVENOUS
Status: COMPLETED
Start: 2023-01-01 | End: 2023-01-01

## 2023-01-01 RX ORDER — DEXTROSE AND SODIUM CHLORIDE 5; .9 G/100ML; G/100ML
INJECTION, SOLUTION INTRAVENOUS CONTINUOUS
Status: DISCONTINUED | OUTPATIENT
Start: 2023-01-01 | End: 2023-01-01

## 2023-01-01 RX ADMIN — DEXTROSE MONOHYDRATE 25 G: 25 INJECTION, SOLUTION INTRAVENOUS at 19:47

## 2023-01-01 RX ADMIN — IOPAMIDOL 75 ML: 755 INJECTION, SOLUTION INTRAVENOUS at 21:31

## 2023-01-01 RX ADMIN — DEXTROSE AND SODIUM CHLORIDE: 5; .9 INJECTION, SOLUTION INTRAVENOUS at 20:11

## 2023-01-01 RX ADMIN — DEXTROSE AND SODIUM CHLORIDE: 5; 900 INJECTION, SOLUTION INTRAVENOUS at 20:11

## 2023-01-01 RX ADMIN — OCTREOTIDE ACETATE 50 MCG: 50 INJECTION, SOLUTION INTRAVENOUS; SUBCUTANEOUS at 20:11

## 2023-01-01 RX ADMIN — SODIUM CHLORIDE 1000 ML: 9 INJECTION, SOLUTION INTRAVENOUS at 19:56

## 2023-01-01 RX ADMIN — SODIUM CHLORIDE 1000 ML: 9 INJECTION, SOLUTION INTRAVENOUS at 21:50

## 2023-01-06 DIAGNOSIS — Z79.4 TYPE 2 DIABETES MELLITUS WITH HYPERGLYCEMIA, WITH LONG-TERM CURRENT USE OF INSULIN (HCC): ICD-10-CM

## 2023-01-06 DIAGNOSIS — E11.42 DIABETIC POLYNEUROPATHY ASSOCIATED WITH TYPE 2 DIABETES MELLITUS (HCC): ICD-10-CM

## 2023-01-06 DIAGNOSIS — E11.65 TYPE 2 DIABETES MELLITUS WITH HYPERGLYCEMIA, WITH LONG-TERM CURRENT USE OF INSULIN (HCC): ICD-10-CM

## 2023-01-09 RX ORDER — HUMAN INSULIN 100 [USP'U]/ML
INJECTION, SUSPENSION SUBCUTANEOUS
Qty: 20 ML | Refills: 0 | Status: SHIPPED | OUTPATIENT
Start: 2023-01-09

## 2023-02-01 ENCOUNTER — OFFICE VISIT (OUTPATIENT)
Dept: PRIMARY CARE CLINIC | Age: 78
End: 2023-02-01
Payer: COMMERCIAL

## 2023-02-01 VITALS
OXYGEN SATURATION: 93 % | HEIGHT: 66 IN | BODY MASS INDEX: 29.81 KG/M2 | WEIGHT: 185.5 LBS | HEART RATE: 73 BPM | DIASTOLIC BLOOD PRESSURE: 60 MMHG | SYSTOLIC BLOOD PRESSURE: 120 MMHG

## 2023-02-01 DIAGNOSIS — Z79.4 TYPE 2 DIABETES MELLITUS WITH HYPERGLYCEMIA, WITH LONG-TERM CURRENT USE OF INSULIN (HCC): ICD-10-CM

## 2023-02-01 DIAGNOSIS — E11.65 TYPE 2 DIABETES MELLITUS WITH HYPERGLYCEMIA, WITH LONG-TERM CURRENT USE OF INSULIN (HCC): ICD-10-CM

## 2023-02-01 DIAGNOSIS — G47.33 OSA (OBSTRUCTIVE SLEEP APNEA): Primary | ICD-10-CM

## 2023-02-01 DIAGNOSIS — R06.02 SOB (SHORTNESS OF BREATH): ICD-10-CM

## 2023-02-01 DIAGNOSIS — J01.41 ACUTE RECURRENT PANSINUSITIS: ICD-10-CM

## 2023-02-01 DIAGNOSIS — E11.42 DIABETIC POLYNEUROPATHY ASSOCIATED WITH TYPE 2 DIABETES MELLITUS (HCC): ICD-10-CM

## 2023-02-01 LAB — HBA1C MFR BLD: 6.8 %

## 2023-02-01 PROCEDURE — 1124F ACP DISCUSS-NO DSCNMKR DOCD: CPT | Performed by: PHYSICIAN ASSISTANT

## 2023-02-01 PROCEDURE — 83036 HEMOGLOBIN GLYCOSYLATED A1C: CPT | Performed by: PHYSICIAN ASSISTANT

## 2023-02-01 PROCEDURE — 99214 OFFICE O/P EST MOD 30 MIN: CPT | Performed by: PHYSICIAN ASSISTANT

## 2023-02-01 SDOH — ECONOMIC STABILITY: FOOD INSECURITY: WITHIN THE PAST 12 MONTHS, THE FOOD YOU BOUGHT JUST DIDN'T LAST AND YOU DIDN'T HAVE MONEY TO GET MORE.: NEVER TRUE

## 2023-02-01 SDOH — ECONOMIC STABILITY: INCOME INSECURITY: HOW HARD IS IT FOR YOU TO PAY FOR THE VERY BASICS LIKE FOOD, HOUSING, MEDICAL CARE, AND HEATING?: NOT HARD AT ALL

## 2023-02-01 SDOH — ECONOMIC STABILITY: FOOD INSECURITY: WITHIN THE PAST 12 MONTHS, YOU WORRIED THAT YOUR FOOD WOULD RUN OUT BEFORE YOU GOT MONEY TO BUY MORE.: NEVER TRUE

## 2023-02-01 SDOH — ECONOMIC STABILITY: HOUSING INSECURITY
IN THE LAST 12 MONTHS, WAS THERE A TIME WHEN YOU DID NOT HAVE A STEADY PLACE TO SLEEP OR SLEPT IN A SHELTER (INCLUDING NOW)?: NO

## 2023-02-01 ASSESSMENT — PATIENT HEALTH QUESTIONNAIRE - PHQ9
SUM OF ALL RESPONSES TO PHQ QUESTIONS 1-9: 0
2. FEELING DOWN, DEPRESSED OR HOPELESS: 0
SUM OF ALL RESPONSES TO PHQ QUESTIONS 1-9: 0
SUM OF ALL RESPONSES TO PHQ QUESTIONS 1-9: 0
SUM OF ALL RESPONSES TO PHQ9 QUESTIONS 1 & 2: 0
1. LITTLE INTEREST OR PLEASURE IN DOING THINGS: 0
SUM OF ALL RESPONSES TO PHQ QUESTIONS 1-9: 0

## 2023-02-01 ASSESSMENT — ENCOUNTER SYMPTOMS
SHORTNESS OF BREATH: 1
CHEST TIGHTNESS: 0
COUGH: 1
SORE THROAT: 0
RHINORRHEA: 0
SINUS PRESSURE: 0

## 2023-02-01 NOTE — PROGRESS NOTES
[facility] PRIMARY CARE  [address] [address]  Dept: [address] [place] [address] Eric Burton is a 69 y.o. female Established patient, who presents today for her medical conditions/complaints as noted below. Chief Complaint   Patient presents with    Diabetes     3 month follow up     Congestion     Coughing up green hang taste awful mucus for the 3 weeks        HPI:     HPI: The patient is a pleasant 60-year-old female who presents today for diabetes check. Her sugars have been running well. Taking insulin about 45 units in morning and 10-15 at night. She is only using her oxygen and no longer using CPAP. On 3 L daily. Does not like her CPAP it is causing her problems with sleep.      Reviewed prior notes None  Reviewed previous Labs    LDL Cholesterol (mg/dL)   Date Value   08/03/2022 157 (H)   07/18/2022 186 (H)   03/26/2021 173 (H)       (goal LDL is <100)   AST (U/L)   Date Value   07/18/2022 25     ALT (U/L)   Date Value   07/18/2022 24     BUN (mg/dL)   Date Value   08/04/2022 16     Hemoglobin A1C (%)   Date Value   10/31/2022 6.6     TSH (uIU/mL)   Date Value   07/18/2022 3.32     BP Readings from Last 3 Encounters:   02/01/23 120/60   10/31/22 118/80   10/21/22 132/76          (goal 120/80)  Hemoglobin A1C   Date Value Ref Range Status   10/31/2022 6.6 % Final     Past Medical History:   Diagnosis Date    COVID 01/08/2022    Diabetes mellitus (HCC)     Seasonal allergies       Past Surgical History:   Procedure Laterality Date    APPENDECTOMY      GALLBLADDER SURGERY      HAND SURGERY N/A     Both hands and right twice     KNEE ARTHROPLASTY      PARTIAL HYSTERECTOMY (CERVIX NOT REMOVED) Right     Age 33. 1 ovary and F. tube    SINUS SURGERY N/A     Two       Family History   Problem Relation Age of Onset    Kidney Disease Mother     Other Mother     Cancer Mother     Heart Disease Father     Stroke Father     Cancer Sister     Cancer Brother Social History     Tobacco Use    Smoking status: Former     Packs/day: 1.00     Years: 20.00     Pack years: 20.00     Types: Cigarettes     Quit date: 46     Years since quittin.1    Smokeless tobacco: Never   Substance Use Topics    Alcohol use: Never      Current Outpatient Medications   Medication Sig Dispense Refill    NOVOLIN 70/30 (70-30) 100 UNIT/ML injection vial INJECT 45 UNITS INTO THE SKIN TWICE DAILY 20 mL 0    Insulin NPH Isophane & Regular (HUMULIN 70/30 KWIKPEN) (70-30) 100 UNIT per ML injection pen Inject 45 Units into the skin 2 times daily 5 Adjustable Dose Pre-filled Pen Syringe 3    Insulin Pen Needle (MEIJER PEN NEEDLES) 31G X 6 MM MISC 1 each by Does not apply route daily 100 each 3    glipiZIDE (GLUCOTROL) 10 MG tablet TAKE 1 TABLET BY MOUTH TWO TIMES A DAY BEFORE MEALS 90 tablet 1    gemfibrozil (LOPID) 600 MG tablet TAKE 1 TABLET BY MOUTH TWO TIMES A  tablet 3    meclizine (ANTIVERT) 25 MG CHEW Take 1 tablet by mouth 3 times daily as needed (dizziness) 90 tablet 1    metFORMIN (GLUCOPHAGE-XR) 500 MG extended release tablet TAKE 1 TABLET BY MOUTH EVERY DAY WITH BREAKFAST 90 tablet 1    pantoprazole (PROTONIX) 40 MG tablet Take 1 tablet by mouth in the morning and at bedtime 90 tablet 1    cefdinir (OMNICEF) 300 MG capsule TAKE 2 TABLETS ONCE DAILY 14 capsule 0    Blood Glucose Monitoring Suppl (ONETOUCH VERIO FLEX SYSTEM) w/Device KIT USE AS DIRECTED TO TEST THREE TIMES DAILY      blood glucose monitor strips Test 3 times a day & as needed for symptoms of irregular blood glucose. Dispense sufficient amount for indicated testing frequency plus additional to accommodate PRN testing needs. 300 strip 2    Insulin Syringe-Needle U-100 (B-D INS SYR ULTRAFINE .5CC/30G) 30G X 1/2\" 0.5 ML MISC 1/2cc/ml syringe with 32g x 8mm needle. May use any brand  E11.9 100 each 3     No current facility-administered medications for this visit.      Allergies   Allergen Reactions    Aspirin Other (See Comments)     Upset stomach      Influenza Vaccines Other (See Comments)    Penicillins     Pneumococcal Vaccine     Seasonal     Statins Other (See Comments)     Liver failure. Health Maintenance   Topic Date Due    Hepatitis C screen  Never done    DTaP/Tdap/Td vaccine (1 - Tdap) Never done    DEXA (modify frequency per FRAX score)  Never done    Shingles vaccine (2 of 3) 12/31/2009    Depression Screen  07/29/2023    Annual Wellness Visit (AWV)  07/30/2023    COVID-19 Vaccine  Completed    Hepatitis A vaccine  Aged Out    Hib vaccine  Aged Out    Meningococcal (ACWY) vaccine  Aged Out       Subjective:      Review of Systems   Constitutional:  Negative for chills and fever. HENT:  Positive for congestion. Negative for hearing loss, rhinorrhea, sinus pressure and sore throat. Respiratory:  Positive for cough and shortness of breath. Negative for chest tightness. Cardiovascular:  Negative for chest pain and palpitations. Genitourinary:  Negative for decreased urine volume, difficulty urinating, frequency and urgency. Skin:  Negative for rash. Neurological:  Positive for numbness. Negative for dizziness, weakness and headaches. Hematological:  Negative for adenopathy. Psychiatric/Behavioral:  Negative for dysphoric mood and sleep disturbance. The patient is not nervous/anxious. Objective:     /60   Pulse 73   Ht 5' 6\" (1.676 m)   Wt 185 lb 8 oz (84.1 kg)   SpO2 93%   BMI 29.94 kg/m²   Physical Exam  Constitutional:       General: She is not in acute distress. Appearance: Normal appearance. She is not ill-appearing. HENT:      Head: Normocephalic and atraumatic. Right Ear: External ear normal.      Left Ear: External ear normal.      Nose: Nose normal.      Mouth/Throat:      Mouth: Mucous membranes are moist.   Eyes:      Extraocular Movements: Extraocular movements intact.       Conjunctiva/sclera: Conjunctivae normal.      Pupils: Pupils are equal, round, and reactive to light. Neck:      Vascular: No carotid bruit. Cardiovascular:      Rate and Rhythm: Normal rate and regular rhythm. Pulses: Normal pulses. Heart sounds: Normal heart sounds. Pulmonary:      Effort: Pulmonary effort is normal. No respiratory distress. Breath sounds: Normal breath sounds. Abdominal:      General: Bowel sounds are normal. There is no distension. Tenderness: There is no abdominal tenderness. Musculoskeletal:         General: Normal range of motion. Cervical back: Normal range of motion and neck supple. Lymphadenopathy:      Cervical: No cervical adenopathy. Skin:     General: Skin is warm and dry. Neurological:      General: No focal deficit present. Mental Status: She is alert and oriented to person, place, and time. Psychiatric:         Mood and Affect: Mood normal.         Behavior: Behavior normal.         Thought Content: Thought content normal.       Assessment and Plan:          1. DAREK (obstructive sleep apnea)  -     Full PFT Study With Bronchodilator; Future  2. Type 2 diabetes mellitus with hyperglycemia, with long-term current use of insulin (HCC)  -     POCT glycosylated hemoglobin (Hb A1C)  3. Diabetic polyneuropathy associated with type 2 diabetes mellitus (HCC)  -     POCT glycosylated hemoglobin (Hb A1C)  4. Acute recurrent pansinusitis  -     CT SINUS WO CONTRAST; Future  5. SOB (shortness of breath)  -     Full PFT Study With Bronchodilator; Future     Please follow-up with ENT to discuss sleep apnea, recurrent sinusitis. Follow-up with pulmonology regarding oxygen and CPAP. Complete pulmonary function test and CT of sinuses. Return in about 3 months (around 5/1/2023) for Diabetes. Patient given educational materials - see patient instructions. Discussed use, benefit, and side effects of prescribed medications. All patient questions answered. Pt voiced understanding.  Reviewed health maintenance. Instructed to continue current medications, diet and exercise. Patient agreed with treatment plan. Follow up as directed.      Electronically signed by MAYRA Pierre on 2/1/2023 at 11:48 AM

## 2023-02-15 DIAGNOSIS — Z79.4 TYPE 2 DIABETES MELLITUS WITH HYPERGLYCEMIA, WITH LONG-TERM CURRENT USE OF INSULIN (HCC): ICD-10-CM

## 2023-02-15 DIAGNOSIS — E11.65 TYPE 2 DIABETES MELLITUS WITH HYPERGLYCEMIA, WITH LONG-TERM CURRENT USE OF INSULIN (HCC): ICD-10-CM

## 2023-02-15 DIAGNOSIS — E11.42 DIABETIC POLYNEUROPATHY ASSOCIATED WITH TYPE 2 DIABETES MELLITUS (HCC): ICD-10-CM

## 2023-02-15 RX ORDER — HUMAN INSULIN 100 [USP'U]/ML
INJECTION, SUSPENSION SUBCUTANEOUS
Qty: 20 ML | Refills: 0 | Status: SHIPPED | OUTPATIENT
Start: 2023-02-15

## 2023-02-22 ENCOUNTER — HOSPITAL ENCOUNTER (OUTPATIENT)
Dept: PULMONOLOGY | Age: 78
Discharge: HOME | End: 2023-02-22
Payer: COMMERCIAL

## 2023-02-22 ENCOUNTER — HOSPITAL ENCOUNTER (OUTPATIENT)
Dept: CT IMAGING | Age: 78
Discharge: HOME | End: 2023-02-24
Payer: COMMERCIAL

## 2023-02-22 DIAGNOSIS — G47.33 OSA (OBSTRUCTIVE SLEEP APNEA): ICD-10-CM

## 2023-02-22 DIAGNOSIS — J01.41 ACUTE RECURRENT PANSINUSITIS: ICD-10-CM

## 2023-02-22 DIAGNOSIS — R06.02 SOB (SHORTNESS OF BREATH): ICD-10-CM

## 2023-02-22 LAB
DLCO %PRED: NORMAL
DLCO PRED: NORMAL
DLCO/VA %PRED: NORMAL
DLCO/VA PRED: NORMAL
DLCO/VA: NORMAL
DLCO: NORMAL
EXPIRATORY TIME: NORMAL
FEF 25-75% %PRED-PRE: NORMAL
FEF 25-75% PRED: NORMAL
FEF 25-75%-PRE: NORMAL
FEV1 %PRED-PRE: NORMAL
FEV1 PRED: NORMAL
FEV1/FVC %PRED-PRE: NORMAL
FEV1/FVC PRED: NORMAL
FEV1/FVC: NORMAL
FEV1: NORMAL
FVC %PRED-PRE: NORMAL
FVC PRED: NORMAL
FVC: NORMAL
GAW %PRED: NORMAL
GAW PRED: NORMAL
GAW: NORMAL
IC %PRED: NORMAL
IC PRED: NORMAL
IC: NORMAL
MVV %PRED-PRE: NORMAL
MVV PRED: NORMAL
MVV-PRE: NORMAL
PEF %PRED-PRE: NORMAL
PEF PRED: NORMAL
PEF-PRE: NORMAL
RAW %PRED: NORMAL
RAW PRED: NORMAL
RAW: NORMAL
RV %PRED: NORMAL
RV PRED: NORMAL
RV: NORMAL
SVC %PRED: NORMAL
SVC PRED: NORMAL
SVC: NORMAL
TLC %PRED: NORMAL
TLC PRED: NORMAL
TLC: NORMAL
VA %PRED: NORMAL
VA PRED: NORMAL
VA: NORMAL
VTG %PRED: NORMAL
VTG PRED: NORMAL
VTG: NORMAL

## 2023-02-22 PROCEDURE — 94664 DEMO&/EVAL PT USE INHALER: CPT

## 2023-02-22 PROCEDURE — 94060 EVALUATION OF WHEEZING: CPT

## 2023-02-22 PROCEDURE — 6370000000 HC RX 637 (ALT 250 FOR IP): Performed by: PHYSICIAN ASSISTANT

## 2023-02-22 PROCEDURE — 70486 CT MAXILLOFACIAL W/O DYE: CPT

## 2023-02-22 PROCEDURE — 94375 RESPIRATORY FLOW VOLUME LOOP: CPT

## 2023-02-22 PROCEDURE — 94726 PLETHYSMOGRAPHY LUNG VOLUMES: CPT

## 2023-02-22 PROCEDURE — 94729 DIFFUSING CAPACITY: CPT

## 2023-02-22 RX ORDER — ALBUTEROL SULFATE 90 UG/1
2 AEROSOL, METERED RESPIRATORY (INHALATION) ONCE
Status: COMPLETED | OUTPATIENT
Start: 2023-02-22 | End: 2023-02-22

## 2023-02-22 RX ADMIN — ALBUTEROL SULFATE 2 PUFF: 90 AEROSOL, METERED RESPIRATORY (INHALATION) at 14:16

## 2023-02-22 NOTE — PROCEDURES
PFT Interpretation:    NORMAL  Lung Mechanics, Volumes,and Diffusion Capacity is noted. Evidence of air trapping / hyperinflation is NOT noted. NO Significant improvement is noted lung mechanics after bronchodilators.       Bulmaro Brewer MD

## 2023-02-23 NOTE — PROGRESS NOTES
Physical Therapy        Physical Therapy Cancel Note      DATE: 8/3/2022    NAME: Irwin Pereira  MRN: [id number]   : 1945      Patient not seen this date for Physical Therapy due to:    Patient is not appropriate for PT evaluation/treatment at this time d/t elevated troponin, awaiting cardiology POC, possible cardiac cath today. PT will check back as time allows.       Electronically signed by Becca Traore PT on 8/3/2022 at 9:05 AM Yes

## 2023-02-24 NOTE — RESULT ENCOUNTER NOTE
Call and advise patient that the results showed chronic sinusitis and slight nasal deviation. Okay to follow up with ENT regarding these results. Okay for referral to [facility] if she does not have preference for ENT. Dx: chronic sinusitis.  /

## 2023-03-06 PROBLEM — E16.2 HYPOGLYCEMIA: Status: ACTIVE | Noted: 2023-01-01

## 2023-03-07 VITALS
TEMPERATURE: 94 F | HEART RATE: 118 BPM | DIASTOLIC BLOOD PRESSURE: 143 MMHG | RESPIRATION RATE: 48 BRPM | SYSTOLIC BLOOD PRESSURE: 170 MMHG | OXYGEN SATURATION: 94 %

## 2023-03-07 LAB — MYOGLOBIN SERPL-MCNC: ABNORMAL NG/ML (ref 25–58)

## 2023-03-07 NOTE — ED NOTES
Patient presents to ED via [address] [place] [address] EMS with complaints of difficulty breathing and an overdose on insulin. Per EMS patient's blood sugar was 59. Upon arrival patient is altered, unable to state her name or date of birth and was only arousable when called upon. Upon arrival patient was breathing approximately 40 times a minute and oxygen saturation was in the mid 80s and patient was cyanotic appearing. Patient states that she took her insulin with the intention of harming herself. Patient is unable to communicate her reasoning why. Patient placed on cardiac monitor, EKG completed, IV access established and blood specimens sent to lab. Dr. Bridgette Weiner and Dr. Gabe Wei at bedside.      Thien Stuart, RN  03/06/23 2006

## 2023-03-07 NOTE — ED NOTES
[] [place]    [] [address]    [x]  One [facility] ASSESSMENT      Y  N     [x] [] In the past two weeks have you had thoughts of hurting yourself in any way? [x] [] In the past two weeks have you had thoughts that you would be better off dead? [x] [] Have you made a suicide attempt in the past two months? [x] [] Do you have a plan for hurting yourself or suicide? [] [] Presence of hallucinations/voices related to hurting himself or herself or someone else. SUICIDE/SECURITY WATCH PRECAUTION CHECKLIST     Orders    [x]  Suicide/Security Watch Precautions initiated as checked below:   3/7/23 12:38 AM EST 20/20    [x] Notified physician:  Fawad Wilkerson MD  3/7/23 12:38 AM EST    [x] Orders obtained as appropriate:     [] 1:1 Observer     [] Psych Consult     [] Psych Consult    Name:  Date:  Time:    [x] 1:1 Observer, Notified by:  David Tello RN    Contact Nurse Supervisor    [x] Remove all personal clothes from room and place in snap/paper gown/pants. Slipper only    [x] Remove all personal belongings from room and secured away from patient. Documentation    [x] Initiate Suicide/Security Watch Precaution Flow Sheet    [x] Initiate individualized Care Plan/Problem    [x] Document why precautions initiated on flow sheet (Initiate Nursing Care Plan/Problem)    [x] 1:1 Observer in place; instructions provided. Suicide precautions require observer be within arms length. [x] Nurse-Observer Communication Hand-off initiated by RN, reviewed with Observer. Subsequently used as Hand Off between Observers. [x] Initiate every 15 minute observations per observer as delegated by the RN.     [x] Initiate RN assessment and documentation    Environmental Scan  Search Criteria and Process: OPTIONAL, see Search Policy    [] Reason for search:    [] Nursing in presence of second person to search patient    [] Patient notified of reason for body assessment and belongings search:     Persons present during search:   Results of search and disposition:       Searchers Name:     These items or items similar should be removed from the room:   [x] Chairs   [x] Telephone   [x] Trash cans and liners   [x] Plastic utensils (order Patient Safety tray)   [x] Empty or remove Sharps containers   [x] All personal clothing/belongings removed   [x] All unnecessary lead wires, electrical cords, draw cords, etc.   [x] Flowers and plants   [x] Double check for lighters, matches, razors, any glass items etc that can be used as weapons. Person completing Checklist: Nima Barcenas RN       GENERAL INFORMATION     Y  N     [x] [] Has the patient been informed that they are on a watch and what that means? [] [x] Can the patient get out of Bed without nursing assistance? [] [x] Can the patient use the restroom without nursing assistance? [] [x] Can the patient walk the halls to [place] their legs? \"   [] [x] Does the patient have metal utensils? [] [] Have the patient's belongings been placed out of control of the patient? [] [x] Have the patient and his/her belongings been checked for contraband? [] [x] Is the patient under any visitor restrictions? If Yes, explain:   [] [x] Is the patient under an alias? [facility] Name:   Authorized visitors (no more than two are to be on the list)   Name/Relationship:   Name/Relationship:    Name of Staff member that you  Received this information from?: KINSEY Mc    General Description:    Susan Arellano 20/20 female 69 y.o. Admission weight:      Race: [x]  [] Black  []   []   [] [place] [] Other  Facial Hair:  [] Yes  [] No  If yes, please describe: Identifying Marks (i.e. Visible tattoos, scars, etc... ):     NURSING CARE PLAN    Nursing Diagnosis: Risk of Self Directed Harm  [x] Actual  [] Potential  Date Started: 03/06/2023  Etiological Factors: (related to)  [] Expressed or implied suicidal ideation/behavior  [x] Depression  [x] Suicide attempt      [] Low self-esteem  [] Hallucinations      [x] Feeling of Hopelessness  [] Substance abuse or withdrawal    [] Dysfunctional family  [] Major traumatic event, eg., divorce, etc   [] Excessive stress/anxiety    3/7/23    Expected Outcomes    Patient will:   [x] Patient will remain safe for the duration of their stay   [x] Patient's environment will be safe, eg. Free of potential suicide weapons   [x] Verbalize Recovery from suicidal episode and improvement in self-worth   [x] Discuss feeling that precipitated suicide attempt/thoughts/behavior   [x] Will describe available resources for crisis prevention and management   [x] Will verbalize positive coping skills     Nursing Intervention   [x] Assessment and Observations hourly   [x] Suicide Precautions implemented with patient, should be 1:1 observation   [x] Document observation [id number] and RN assessment hourly   [] Consult physician for:    [] Psychiatric consult    [] Pharmacological therapy    [] Other:    [x] Patient search completed by security   [x] Initiated appropriate safety protocols by removing from the patient's environment anything that could be used to inflict self injury, eg. Order safe tray, snap gown, etc   [x] Maintain open, warm, caring, non-judgmental attitude/manner towards patient   [x] Discuss advantages and disadvantages of existing coping methods/skills   [x] Assist and educate patient with identifying present strengths and coping skills   [x] Keep patient informed regarding plan of care and provide clear concise explanations. Provide the patient/family education information as well as telephone numbers and other information about crisis centers, hot lines, and counselors.     Discharge Planning:   [] Referral  [] Groups [] Health agencies  [] Other:            Barby Roland RN  03/07/23 0040

## 2023-03-07 NOTE — ED NOTES
2218 no pulse- CPR started  2220 no pulse -CPR continues   2223 1 mg epi given per Francisco Acuna RN   2224 Sodium bi carb given Zia RN   2224 no pulse CPR continues  2225 1 mg Epi Zia RN   2226 no pulse CPR continues  2227 Calcium chloride given Francisco Acuna RN   2228 no pulse CPR continues  2228 1mg Epi Zia RN   2230 no pulse CPR continues  2231 300 cc blood out oral   2232 no pulse CPR continues  2233 1 MG epi Alexandro RN   3368 no pulse CPR continues   2234 ET Tube 24 at teeth   2235 400 cc bright red blood in suction   2236 Asystole no pulse   Time of Death called per Jenaro Garcia, RN  03/06/23 5803

## 2023-03-07 NOTE — ED NOTES
Talked to [facility] [facility] Mayank Lira with [facility]  Referral # [id number]     Adrianna Arredondo RN  03/07/23 4996

## 2023-03-07 NOTE — ED PROVIDER NOTES
[facility] ED     Emergency Department     Faculty Attestation    I performed a history and physical examination of the patient and discussed management with the resident. I reviewed the residents note and agree with the documented findings and plan of care. Any areas of disagreement are noted on the chart. I was personally present for the key portions of any procedures. I have documented in the chart those procedures where I was not present during the key portions. I have reviewed the emergency nurses triage note. I agree with the chief complaint, past medical history, past surgical history, allergies, medications, social and family history as documented unless otherwise noted below. For Physician Assistant/ Nurse Practitioner cases/documentation I have personally evaluated this patient and have completed at least one if not all key elements of the E/M (history, physical exam, and MDM). Additional findings are as noted. Brought in by EMS after she took an intentional overdose of her insulin today. She says she took both her long and short acting insulin. On patient's medication list is also listed glipizide and metformin. Patient was drowsy but arousable to name on arrival.  She was also quite tachypneic and hypotensive. Patient was given an amp of D50 on arrival and started on a liter of normal saline. Patient did become more alert after the dextrose and fluids and blood pressure has come up well. Patient is now complaining of mid back pain that she says is new since she took the insulin. Is unclear if patient fell or had any trauma. We will obtain imaging and labs and plan to admit patient.     EKG Interpretation    Interpreted by emergency department physician    Rhythm: atrial fibrillation - controlled  Rate: normal  Axis: normal  Ectopy: none  Conduction: normal  ST Segments: nonspecific changes  T Waves: non specific changes  Q Waves: none    Clinical Impression: non-specific EKG, Gerald Bowen MD    Was called into patient room when patient lost pulses. ACLS was immediately started. Chest compressions were begun and patient was given an immediate dose of epinephrine. Initial rhythm was PEA. Intubation was attempted by residents but there was significant blood from the airway. I was eventually able to intubate patient. Patient was given doses of bicarb, dextrose, calcium in addition to multiple epinephrine doses. Patient became asystolic and time of death was called by Dr. Lauryn Guzman.     Dez Hernandez MD  Attending Emergency  Physician            Lul Wesley MD  03/06/23 3734       Lul Wesley MD  03/06/23 9200

## 2023-03-07 NOTE — ED PROVIDER NOTES
I was not involved in the care of this patient. It was  prior to my arrival on shift. My name is assigned to the chart in error.     Acacia Mata MD  Emergency Medicine Attending        Acacia Mata MD  23 [phone]

## 2023-03-07 NOTE — ED NOTES
Patient sitting up in bed  Patient still has increased work of breathing, on 15L O2 via NRB mask  Patient still presenting altered  Constant observer at bedside  Bed locked and in lowest position  Side rails up x2  Will continue to monitor     Naila Hernandez RN  03/07/23 8831

## 2023-03-07 NOTE — ED PROVIDER NOTES
[address] ED  Emergency Department Encounter  Emergency Medicine Resident     Pt Poonam Lockhart  MRN: [id number]  [place] 1945  Date of evaluation: 3/6/23  PCP:  No primary care provider on file. Note Started: 8:25 PM EST    CHIEF COMPLAINT       Chief Complaint   Patient presents with    Drug Overdose    Hypoglycemia     HISTORY OF PRESENT ILLNESS  (Location/Symptom, Timing/Onset, Context/Setting, Quality, Duration, Modifying Factors, Severity.)      Avtar Davis is a 69 y.o. female who presents with AMS after reportedly injecting a full pen of 70/30 insulin. Patient's med list also has glipizide. Per EMS, son was concerned that the patient attempted suicide as they did recently have a discussion about her moving out of his house and finding an alternative living arrangement. EMS' first BG was 52, and they gave glucagon. POC glucose 71 upon arrival to ED. She was moaning, with slight blue discoloration on initial evaluation, but still following commands. Patient was unable to provide further history, ROS at time of initial evaluation due to acuity of condition. PAST MEDICAL / SURGICAL / SOCIAL / FAMILY HISTORY     PMH: diabetes    Unable to provide further medical or surgical history due to acuity of condition.      Social History     Socioeconomic History    Marital status: Not on file     Spouse name: Not on file    Number of children: Not on file    Years of education: Not on file    Highest education level: Not on file   Occupational History    Not on file   Tobacco Use    Smoking status: Not on file    Smokeless tobacco: Not on file   Substance and Sexual Activity    Alcohol use: Not on file    Drug use: Not on file    Sexual activity: Not on file   Other Topics Concern    Not on file   Social History Narrative    Not on file     Social Determinants of Health     Financial Resource Strain: Not on file   Food Insecurity: Not on file   Transportation Needs: Not on file Physical Activity: Not on file   Stress: Not on file   Social Connections: Not on file   Intimate Partner Violence: Not on file   Housing Stability: Not on file     No family history on file. Allergies:  Patient has no allergy information on record. Home Medications:  Prior to Admission medications    Not on File     REVIEW OF SYSTEMS       Review of Systems   Unable to perform ROS: Acuity of condition     PHYSICAL EXAM      INITIAL VITALS:   BP (!) 170/143   Pulse (!) 118   Temp (!) 94 °F (34.4 °C) (Rectal)   Resp (!) 48   SpO2 94%     Physical Exam  Constitutional:       General: She is in acute distress. Appearance: She is ill-appearing and toxic-appearing. Comments: Notably cyanotic and in acute distress. Groaning and unable to respond to questions. Following commands however. HENT:      Mouth/Throat:      Mouth: Mucous membranes are dry. Eyes:      Extraocular Movements: Extraocular movements intact. Pupils: Pupils are equal, round, and reactive to light. Cardiovascular:      Rate and Rhythm: Normal rate and regular rhythm. Pulses: Normal pulses. Heart sounds: Normal heart sounds. Comments: palpable distal pulses  Pulmonary:      Effort: Respiratory distress present. Breath sounds: No wheezing. Comments: Tachypnea and increased work of breathing but breath sounds are clear throughout. Abdominal:      General: There is no distension. Palpations: Abdomen is soft. Tenderness: There is no abdominal tenderness. There is no guarding. Skin:     Comments: Cold, cyanotic and mottled   Neurological:      Motor: Weakness (global) present. Comments: Groaning unable to answer questions however following hands for wiggling toes and handgrip bilaterally.      DDX/DIAGNOSTIC RESULTS / EMERGENCY DEPARTMENT COURSE / MDM     Medical Decision Making  Elderly female presenting to the emergency department EMS after reportedly injecting a full pen of 70/30 insulin. Patient also has glipizide listed on her home med rec. Unclear if patient also took this medication. Patient notably altered, blue appearing but still following commands on initial evaluation. Patient given glucagon by EMS prior to arrival for blood glucose in the 50s. Glucose in the 70s when first checked in the ED and patient also given a amp of dextrose. We will plan to start patient on 5% dextrose in normal saline. Monitor blood glucose for acute change with POC testing every 20-30 minutes, sooner if mental status change. Stat CXR. Will obtain full AMS work-up. We will also give dose of octreotide due to glipizide being in her home med rec. Plan for admission to ICU. Suicide watch. Amount and/or Complexity of Data Reviewed  Independent Historian: EMS     Details: son and daughter in law  Labs: ordered. Decision-making details documented in ED Course. Radiology: ordered. Decision-making details documented in ED Course. ECG/medicine tests: ordered and independent interpretation performed. Decision-making details documented in ED Course. Risk  Prescription drug management. Decision regarding hospitalization. Critical Care  Total time providing critical care:  minutes    EKG  EKG 1947: Normal sinus rhythm at 77 bpm.  QTc slightly prolonged at 560 ms. There are some ST depressions throughout V3 through V6. >1mm in V3. Mild ST elevation in V1. EKG 2111: Sinus rhythm at 83 bpm.  QTc prolonged at 495 ms. Resolution of ST segment depressions in lateral leads. 1 PVC noted. EKG 2115 posterior: Sinus rhythm at 83 bpm.  QTc 491 ms. Frequent PVCs.   No ST segment depression or elevation in V 7/8/9    All EKG's are interpreted by the Emergency Department Physician who either signs or Co-signs this chart in the absence of a cardiologist.    EMERGENCY DEPARTMENT COURSE:    ED Course as of 03/07/23 0532   Mon Mar 06, 2023   2027 XR CHEST PORTABLE  Bilateral perihilar and infrahilar ground-glass opacities may reflect edema,  aspiration, or pneumonia in the appropriate clinical setting.     [CP]   2027 WBC(!): 12.7  Acute leukocytosis [CP]   2028 POC Glucose(!): 338  Holding D5 normal saline due to elevated blood glucose. Will recheck in 30 minutes. Do not want to overload with fluid. [CP]   2041 Much more awake and interactive with examination. Patient stating that she has back pain. Does not typically have back pain will obtain trauma scans is unclear if patient had a fall after insulin overdose. [CP]   2041 Lactic Acid, Whole Blood(!): 10.3  Will recheck after IVF and improving BG [CP]   2105 Troponin, High Sensitivity(!!): 737  Checking posterior EKG [CP]   2108 Lactic Acid, Whole Blood(!): 12.0  Restarting fluids [CP]   2127 Spoke with Interventional Cardiology. No acute cath intervention required, but would trend trops and start heparin should patient be cleared of acute bleed once CT imaging obtained. [CP]   1755 Troponin, High Sensitivity(!!): 1,167 [CP]   2206 Spoke with Dr. Sebastián Andrea from critical care team.  Patient to be admitted to the medical ICU. [CP]   2217 Called and spoke with son Kartik Christensen, who requested update be given to his wife Brandyn Barragan. Began to update on ICU admission but was called to patient room as patient lost pulses. [CP]   1744 YHOOOM to patient room as patient suddenly lost pulses. CPR was initiated immediately and patient given multiple doses of epinephrine as well as bicarb, calcium. Airway was attempted on multiple occasions by myself however noted to have difficult airway due to anterior placement as well as significant amount of active bleeding from the airway. >400cc out of airway during intubation attempts. Airway was secured by Dr. Ryan El. Multiple rounds of CPR unable to patient remained in PEA throughout the entirety of the code and decision was made to call time of death at 2236.   [CP]   4378 Spoke with , who will accept the patient as 's case given suspicion for suicide attempt [CP]   7947 Updated son Ramón Adair and daughter-in-law Kang Weems who arrived to the emergency department. Did receive more information that apparently over the weekend they discussed Rena Palacios moving out of their living place. Son states that everything seemed okay the following day and they played poker and she seemed her normal self. He went to work this morning at 6 AM and everything seemed to be okay. Daughter-in-law Kang Weems saw the patient this morning she went to the fridge like normal and administered her insulin around 10 AM.  She then went into her room with her computer. Kang Weems checked on Dorthea before leaving the house for some errands. Kang Weems got back to the house around 3 PM and thought she heard Dorthea walking around the house. Little after 6 PM when dinner is ready Ramón Adair went to get his mom and noticed that she was acting strangely. He called EMS at that time. [CP]   2848 Patient had been accepted to the medical ICU admission order was placed by myself while pending evaluation by the ICU team.  Due to acuity in the ICU, this patient has not been physically evaluated by the ICU team and was thus removed from the care team. [CP]   Tue Mar 07, 2023   Clovis Rivera Spoke with physician from PCP office and updated on time of death, and patient being a 's case. [CP]      ED Course User Index  [CP] iDlia Kennedy MD     PROCEDURES:  Intubation Procedure Note    Performed by: Indira Gallego MD, and Dr. Natividad Cockayne    Indication: Respiratory failure, airway compromise, and airway protection    Consent: Unable to be obtained due to the emergent nature of this procedure. Time out performed: Immediately prior to the procedure a \"time out\" was called to verify the correct patient, the correct procedure, equipment, support staff and site/side marked as required.       Medications Used: Unable to premedicate due to the emergent nature of the procedure    Procedure: The patient was placed in the appropriate position. Intubation was performed by direct video laryngoscopy using a laryngoscope and a 7.5 cuffed endotracheal tube. The cuff was then inflated and the tube was secured appropriately at a distance of 23 cm to the dental ridge. Initial confirmation of placement included tube fogging. A chest x-ray was not obtained as time of death called. The patient tolerated the procedure poorly due to large volume blood in airway and concomitant code. Complications: multiple attempts      CONSULTS:  IP CONSULT TO CARDIOLOGY  IP CONSULT TO CRITICAL CARE    CRITICAL CARE:  There was significant risk of life threatening deterioration of patient's condition requiring my direct management. Critical care time 80 minutes, excluding any documented procedures. FINAL IMPRESSION      1. Hypoglycemia    2. NSTEMI (non-ST elevated myocardial infarction) ([facility] [address]    3. Hypothermia, initial encounter    4. Cardiopulmonary arrest [facility])        DISPOSITION / PLAN     DISPOSITION  2023 11:27:01 PM      PATIENT REFERRED TO:  No follow-up provider specified. DISCHARGE MEDICATIONS:  There are no discharge medications for this patient.       Kelsy Root MD  Emergency Medicine Resident    (Please note that portions of thisnote were completed with a voice recognition program.  Efforts were made to edit the dictations but occasionally words are mis-transcribed.)       Monica Joiner MD  Resident  23 [phone]

## 2023-03-07 NOTE — DEATH NOTES
I was called by the ED for patient admission to Medical ICU at 9:58pm - discussion about patient was 5 mins long as patient was critically ill. Patient was accepted to ICU, and admission orders were placed by ED team, given patients overall ill status and hemodynamic instability. I did not see patient physically or perform chart review of patient prior to loss of pulse. Patient was not staffed with ICU attending because of above statements. I was not called when patient lost pulse. Per ED staff, patient is now .     Yasmeen Andre MD  23  11:09 PM  Medical ICU resident

## 2023-03-07 NOTE — ED NOTES
Patient repositioned in bed  Patient now sitting up in bed  Patient still has increased work of breathing, on 15L O2 via NRB mask, Dr. Madan Bowden notified  Patient still presenting altered  Constant observer at bedside  Bed locked and in lowest position  Side rails up x2  Will continue to monitor     Sukhwinder Hu RN  03/07/23 0044

## 2023-03-07 NOTE — ED NOTES
Patient is being admitted to medical floor. Patient will require psych assessment/tele-psych due to suicide attempt of intentional insulin overdose.      Kimberly Hunt, [facility]  03/06/23 1129 Sir Blaise Bourgeois, [facility]  03/06/23 4447

## 2023-03-08 LAB
EKG ATRIAL RATE: 45 BPM
EKG Q-T INTERVAL: 426 MS
EKG QRS DURATION: 86 MS
EKG QTC CALCULATION (BAZETT): 506 MS
EKG R AXIS: -20 DEGREES
EKG T AXIS: 57 DEGREES
EKG VENTRICULAR RATE: 85 BPM